# Patient Record
Sex: MALE | Race: WHITE | NOT HISPANIC OR LATINO | Employment: FULL TIME | URBAN - METROPOLITAN AREA
[De-identification: names, ages, dates, MRNs, and addresses within clinical notes are randomized per-mention and may not be internally consistent; named-entity substitution may affect disease eponyms.]

---

## 2017-04-08 ENCOUNTER — APPOINTMENT (OUTPATIENT)
Dept: LAB | Facility: CLINIC | Age: 67
End: 2017-04-08
Payer: COMMERCIAL

## 2017-04-08 DIAGNOSIS — I10 ESSENTIAL (PRIMARY) HYPERTENSION: ICD-10-CM

## 2017-04-08 DIAGNOSIS — E78.5 HYPERLIPIDEMIA: ICD-10-CM

## 2017-04-08 LAB
CHOLEST SERPL-MCNC: 175 MG/DL (ref 50–200)
HDLC SERPL-MCNC: 42 MG/DL (ref 40–60)
LDLC SERPL CALC-MCNC: 115 MG/DL (ref 0–100)
TRIGL SERPL-MCNC: 88 MG/DL

## 2017-04-08 PROCEDURE — 36415 COLL VENOUS BLD VENIPUNCTURE: CPT

## 2017-04-08 PROCEDURE — 80061 LIPID PANEL: CPT

## 2017-04-19 ENCOUNTER — ALLSCRIPTS OFFICE VISIT (OUTPATIENT)
Dept: OTHER | Facility: OTHER | Age: 67
End: 2017-04-19

## 2017-08-15 ENCOUNTER — APPOINTMENT (OUTPATIENT)
Dept: LAB | Facility: CLINIC | Age: 67
End: 2017-08-15
Payer: COMMERCIAL

## 2017-08-15 ENCOUNTER — TRANSCRIBE ORDERS (OUTPATIENT)
Dept: LAB | Facility: CLINIC | Age: 67
End: 2017-08-15

## 2017-08-15 DIAGNOSIS — R97.20 ELEVATED PROSTATE SPECIFIC ANTIGEN (PSA): Primary | ICD-10-CM

## 2017-08-15 DIAGNOSIS — R97.20 ELEVATED PROSTATE SPECIFIC ANTIGEN (PSA): ICD-10-CM

## 2017-08-15 LAB — PSA SERPL-MCNC: 5.1 NG/ML (ref 0–4)

## 2017-08-15 PROCEDURE — 84153 ASSAY OF PSA TOTAL: CPT

## 2017-08-23 ENCOUNTER — GENERIC CONVERSION - ENCOUNTER (OUTPATIENT)
Dept: OTHER | Facility: OTHER | Age: 67
End: 2017-08-23

## 2017-08-28 ENCOUNTER — GENERIC CONVERSION - ENCOUNTER (OUTPATIENT)
Dept: OTHER | Facility: OTHER | Age: 67
End: 2017-08-28

## 2017-10-02 DIAGNOSIS — Z00.00 ENCOUNTER FOR GENERAL ADULT MEDICAL EXAMINATION WITHOUT ABNORMAL FINDINGS: ICD-10-CM

## 2017-10-02 DIAGNOSIS — E78.5 HYPERLIPIDEMIA: ICD-10-CM

## 2017-10-02 DIAGNOSIS — E78.00 PURE HYPERCHOLESTEROLEMIA: ICD-10-CM

## 2017-10-02 DIAGNOSIS — I10 ESSENTIAL (PRIMARY) HYPERTENSION: ICD-10-CM

## 2017-10-07 ENCOUNTER — APPOINTMENT (OUTPATIENT)
Dept: LAB | Facility: CLINIC | Age: 67
End: 2017-10-07
Payer: COMMERCIAL

## 2017-10-07 DIAGNOSIS — Z00.00 ENCOUNTER FOR GENERAL ADULT MEDICAL EXAMINATION WITHOUT ABNORMAL FINDINGS: ICD-10-CM

## 2017-10-07 DIAGNOSIS — E78.00 PURE HYPERCHOLESTEROLEMIA: ICD-10-CM

## 2017-10-07 DIAGNOSIS — I10 ESSENTIAL (PRIMARY) HYPERTENSION: ICD-10-CM

## 2017-10-07 LAB
ALBUMIN SERPL BCP-MCNC: 3.8 G/DL (ref 3.5–5)
ALP SERPL-CCNC: 94 U/L (ref 46–116)
ALT SERPL W P-5'-P-CCNC: 21 U/L (ref 12–78)
ANION GAP SERPL CALCULATED.3IONS-SCNC: 6 MMOL/L (ref 4–13)
AST SERPL W P-5'-P-CCNC: 15 U/L (ref 5–45)
BASOPHILS # BLD AUTO: 0.03 THOUSANDS/ΜL (ref 0–0.1)
BASOPHILS NFR BLD AUTO: 1 % (ref 0–1)
BILIRUB SERPL-MCNC: 0.97 MG/DL (ref 0.2–1)
BUN SERPL-MCNC: 17 MG/DL (ref 5–25)
CALCIUM SERPL-MCNC: 8.1 MG/DL (ref 8.3–10.1)
CHLORIDE SERPL-SCNC: 105 MMOL/L (ref 100–108)
CHOLEST SERPL-MCNC: 153 MG/DL (ref 50–200)
CO2 SERPL-SCNC: 30 MMOL/L (ref 21–32)
CREAT SERPL-MCNC: 1.02 MG/DL (ref 0.6–1.3)
EOSINOPHIL # BLD AUTO: 0.05 THOUSAND/ΜL (ref 0–0.61)
EOSINOPHIL NFR BLD AUTO: 1 % (ref 0–6)
ERYTHROCYTE [DISTWIDTH] IN BLOOD BY AUTOMATED COUNT: 12.8 % (ref 11.6–15.1)
GFR SERPL CREATININE-BSD FRML MDRD: 77 ML/MIN/1.73SQ M
GLUCOSE P FAST SERPL-MCNC: 102 MG/DL (ref 65–99)
HCT VFR BLD AUTO: 47.9 % (ref 36.5–49.3)
HDLC SERPL-MCNC: 50 MG/DL (ref 40–60)
HGB BLD-MCNC: 16.7 G/DL (ref 12–17)
LDLC SERPL CALC-MCNC: 87 MG/DL (ref 0–100)
LYMPHOCYTES # BLD AUTO: 0.94 THOUSANDS/ΜL (ref 0.6–4.47)
LYMPHOCYTES NFR BLD AUTO: 22 % (ref 14–44)
MCH RBC QN AUTO: 30.8 PG (ref 26.8–34.3)
MCHC RBC AUTO-ENTMCNC: 34.9 G/DL (ref 31.4–37.4)
MCV RBC AUTO: 88 FL (ref 82–98)
MONOCYTES # BLD AUTO: 0.35 THOUSAND/ΜL (ref 0.17–1.22)
MONOCYTES NFR BLD AUTO: 8 % (ref 4–12)
NEUTROPHILS # BLD AUTO: 2.98 THOUSANDS/ΜL (ref 1.85–7.62)
NEUTS SEG NFR BLD AUTO: 68 % (ref 43–75)
NRBC BLD AUTO-RTO: 0 /100 WBCS
PLATELET # BLD AUTO: 239 THOUSANDS/UL (ref 149–390)
PMV BLD AUTO: 9.1 FL (ref 8.9–12.7)
POTASSIUM SERPL-SCNC: 4.5 MMOL/L (ref 3.5–5.3)
PROT SERPL-MCNC: 7.6 G/DL (ref 6.4–8.2)
RBC # BLD AUTO: 5.42 MILLION/UL (ref 3.88–5.62)
SODIUM SERPL-SCNC: 141 MMOL/L (ref 136–145)
TRIGL SERPL-MCNC: 78 MG/DL
WBC # BLD AUTO: 4.37 THOUSAND/UL (ref 4.31–10.16)

## 2017-10-07 PROCEDURE — 85025 COMPLETE CBC W/AUTO DIFF WBC: CPT

## 2017-10-07 PROCEDURE — 80053 COMPREHEN METABOLIC PANEL: CPT

## 2017-10-07 PROCEDURE — 36415 COLL VENOUS BLD VENIPUNCTURE: CPT

## 2017-10-07 PROCEDURE — 80061 LIPID PANEL: CPT

## 2017-10-30 ENCOUNTER — GENERIC CONVERSION - ENCOUNTER (OUTPATIENT)
Dept: OTHER | Facility: OTHER | Age: 67
End: 2017-10-30

## 2018-01-13 VITALS
SYSTOLIC BLOOD PRESSURE: 130 MMHG | OXYGEN SATURATION: 98 % | BODY MASS INDEX: 23.86 KG/M2 | HEIGHT: 73 IN | WEIGHT: 180 LBS | HEART RATE: 70 BPM | DIASTOLIC BLOOD PRESSURE: 70 MMHG

## 2018-01-22 VITALS
BODY MASS INDEX: 19.51 KG/M2 | OXYGEN SATURATION: 96 % | SYSTOLIC BLOOD PRESSURE: 126 MMHG | WEIGHT: 147.25 LBS | HEART RATE: 74 BPM | HEIGHT: 73 IN | DIASTOLIC BLOOD PRESSURE: 76 MMHG

## 2018-02-07 DIAGNOSIS — E78.5 HYPERLIPIDEMIA, UNSPECIFIED HYPERLIPIDEMIA TYPE: ICD-10-CM

## 2018-02-07 DIAGNOSIS — I10 BENIGN HYPERTENSION: Primary | ICD-10-CM

## 2018-02-07 DIAGNOSIS — E78.00 HYPERCHOLESTEREMIA: ICD-10-CM

## 2018-02-07 DIAGNOSIS — R60.9 EDEMA, UNSPECIFIED TYPE: ICD-10-CM

## 2018-02-07 RX ORDER — LOSARTAN POTASSIUM 100 MG/1
1 TABLET ORAL DAILY
COMMUNITY
Start: 2014-07-24 | End: 2018-02-07 | Stop reason: SDUPTHER

## 2018-02-08 RX ORDER — LOSARTAN POTASSIUM 100 MG/1
100 TABLET ORAL DAILY
Qty: 90 TABLET | Refills: 3 | Status: SHIPPED | OUTPATIENT
Start: 2018-02-08 | End: 2019-02-20 | Stop reason: CLARIF

## 2018-05-10 ENCOUNTER — APPOINTMENT (OUTPATIENT)
Dept: LAB | Facility: CLINIC | Age: 68
End: 2018-05-10
Payer: COMMERCIAL

## 2018-05-10 DIAGNOSIS — I10 ESSENTIAL (PRIMARY) HYPERTENSION: ICD-10-CM

## 2018-05-10 DIAGNOSIS — E78.5 HYPERLIPIDEMIA: ICD-10-CM

## 2018-05-10 LAB
ANION GAP SERPL CALCULATED.3IONS-SCNC: 4 MMOL/L (ref 4–13)
BUN SERPL-MCNC: 19 MG/DL (ref 5–25)
CALCIUM SERPL-MCNC: 8.7 MG/DL (ref 8.3–10.1)
CHLORIDE SERPL-SCNC: 106 MMOL/L (ref 100–108)
CHOLEST SERPL-MCNC: 146 MG/DL (ref 50–200)
CO2 SERPL-SCNC: 29 MMOL/L (ref 21–32)
CREAT SERPL-MCNC: 1.16 MG/DL (ref 0.6–1.3)
GFR SERPL CREATININE-BSD FRML MDRD: 65 ML/MIN/1.73SQ M
GLUCOSE P FAST SERPL-MCNC: 103 MG/DL (ref 65–99)
HDLC SERPL-MCNC: 43 MG/DL (ref 40–60)
LDLC SERPL CALC-MCNC: 80 MG/DL (ref 0–100)
NONHDLC SERPL-MCNC: 103 MG/DL
POTASSIUM SERPL-SCNC: 4.1 MMOL/L (ref 3.5–5.3)
SODIUM SERPL-SCNC: 139 MMOL/L (ref 136–145)
TRIGL SERPL-MCNC: 114 MG/DL

## 2018-05-10 PROCEDURE — 80061 LIPID PANEL: CPT

## 2018-05-10 PROCEDURE — 80048 BASIC METABOLIC PNL TOTAL CA: CPT

## 2018-05-10 PROCEDURE — 36415 COLL VENOUS BLD VENIPUNCTURE: CPT

## 2018-05-18 ENCOUNTER — OFFICE VISIT (OUTPATIENT)
Dept: INTERNAL MEDICINE CLINIC | Facility: CLINIC | Age: 68
End: 2018-05-18
Payer: COMMERCIAL

## 2018-05-18 VITALS
BODY MASS INDEX: 23.99 KG/M2 | DIASTOLIC BLOOD PRESSURE: 76 MMHG | HEART RATE: 79 BPM | HEIGHT: 73 IN | OXYGEN SATURATION: 97 % | SYSTOLIC BLOOD PRESSURE: 130 MMHG | WEIGHT: 181 LBS

## 2018-05-18 DIAGNOSIS — I10 BENIGN ESSENTIAL HYPERTENSION: Primary | ICD-10-CM

## 2018-05-18 DIAGNOSIS — N40.0 BENIGN PROSTATIC HYPERPLASIA WITHOUT LOWER URINARY TRACT SYMPTOMS: ICD-10-CM

## 2018-05-18 DIAGNOSIS — K63.5 POLYP OF COLON, UNSPECIFIED PART OF COLON, UNSPECIFIED TYPE: ICD-10-CM

## 2018-05-18 PROCEDURE — 3075F SYST BP GE 130 - 139MM HG: CPT | Performed by: INTERNAL MEDICINE

## 2018-05-18 PROCEDURE — 1101F PT FALLS ASSESS-DOCD LE1/YR: CPT | Performed by: INTERNAL MEDICINE

## 2018-05-18 PROCEDURE — 99214 OFFICE O/P EST MOD 30 MIN: CPT | Performed by: INTERNAL MEDICINE

## 2018-05-18 PROCEDURE — 3008F BODY MASS INDEX DOCD: CPT | Performed by: INTERNAL MEDICINE

## 2018-05-18 PROCEDURE — 3078F DIAST BP <80 MM HG: CPT | Performed by: INTERNAL MEDICINE

## 2018-05-18 NOTE — PROGRESS NOTES
Assessment/Plan:  Labs reviewed  Cholesterol is quite good  PSA followed by his urologist     Blood pressure under control  Up-to-date on colonoscopies for colon polyps  No cardiac complaints  Up-to-date on eye checkups  Review of advised him to get a shingles vaccine  Will see him in 6 months  Recent Results (from the past 1008 hour(s))   Basic metabolic panel    Collection Time: 05/10/18  7:55 AM   Result Value Ref Range    Sodium 139 136 - 145 mmol/L    Potassium 4 1 3 5 - 5 3 mmol/L    Chloride 106 100 - 108 mmol/L    CO2 29 21 - 32 mmol/L    Anion Gap 4 4 - 13 mmol/L    BUN 19 5 - 25 mg/dL    Creatinine 1 16 0 60 - 1 30 mg/dL    Glucose, Fasting 103 (H) 65 - 99 mg/dL    Calcium 8 7 8 3 - 10 1 mg/dL    eGFR 65 ml/min/1 73sq m   Lipid panel    Collection Time: 05/10/18  7:55 AM   Result Value Ref Range    Cholesterol 146 50 - 200 mg/dL    Triglycerides 114 <=150 mg/dL    HDL, Direct 43 40 - 60 mg/dL    LDL Calculated 80 0 - 100 mg/dL    Non-HDL-Chol (CHOL-HDL) 103 mg/dl       1  Benign essential hypertension  Basic metabolic panel       Orders Placed This Encounter   Procedures    Basic metabolic panel    Hm Colonoscopy         Subjective:  Problems are 1  BPH 2  Colon polyps 3  Hypertension     Patient ID: Itz Christine is a 78 y o  male  HPI he is doing quite well  He retired  He is enjoying his longterm  No cardiopulmonary complaints  No chest pain shortness of breath  The following portions of the patient's history were reviewed and updated as appropriate:   He has a past medical history of Benign neoplasm of large intestine; Hemorrhoids; Inflamed seborrheic keratosis; Multiple benign polyps of large intestine; Neoplasm of uncertain behavior of skin; and Sebaceous cyst ,   does not have any pertinent problems on file  ,   has a past surgical history that includes Knee arthroscopy (Left); Hand surgery (Left); Inguinal hernia repair; and Tonsillectomy  ,  family history includes Other in his father; Stroke in his mother  ,   reports that he has never smoked  He has never used smokeless tobacco  He reports that he does not drink alcohol or use drugs  ,  has No Known Allergies       Current Outpatient Prescriptions:     losartan (COZAAR) 100 MG tablet, Take 1 tablet (100 mg total) by mouth daily, Disp: 90 tablet, Rfl: 3    Review of Systems   Constitutional: Negative for activity change, appetite change, chills, diaphoresis, fatigue, fever and unexpected weight change  HENT: Negative for congestion, ear pain, hearing loss, mouth sores, nosebleeds, postnasal drip, sinus pain, sinus pressure, sore throat and trouble swallowing  Eyes: Negative for pain, discharge and visual disturbance  Respiratory: Negative for apnea, cough, chest tightness, shortness of breath and wheezing  Cardiovascular: Negative for chest pain, palpitations and leg swelling  Gastrointestinal: Negative for abdominal pain, anal bleeding, blood in stool, constipation, diarrhea, nausea and vomiting  Endocrine: Negative for polydipsia and polyphagia  Genitourinary: Negative for decreased urine volume, dysuria, flank pain, frequency, hematuria and urgency  Musculoskeletal: Negative for arthralgias, back pain, gait problem, joint swelling and myalgias  Skin: Negative for rash and wound  Allergic/Immunologic: Negative for environmental allergies and food allergies  Neurological: Negative for dizziness, tremors, seizures, syncope, speech difficulty, light-headedness, numbness and headaches  Hematological: Negative for adenopathy  Does not bruise/bleed easily  Psychiatric/Behavioral: Negative for agitation, confusion, hallucinations, sleep disturbance and suicidal ideas  The patient is not nervous/anxious  Objective:  Vitals:    05/18/18 0958   BP: 130/76   Pulse: 79   SpO2: 97%     Body mass index is 23 88 kg/m²       Physical Exam   Constitutional: He appears well-developed and well-nourished  No distress  Blood pressure is 130/82  Rhythm is regular  No murmur  HENT:   Head: Normocephalic  Right Ear: External ear normal    Left Ear: External ear normal    Nose: Nose normal    Mouth/Throat: Oropharynx is clear and moist  No oropharyngeal exudate  Eyes: Conjunctivae and EOM are normal  Pupils are equal, round, and reactive to light  Right eye exhibits no discharge  Left eye exhibits no discharge  Neck: Normal range of motion  Neck supple  No thyromegaly present  Cardiovascular: Normal rate, regular rhythm, normal heart sounds and intact distal pulses  Exam reveals no gallop and no friction rub  No murmur heard  Pulmonary/Chest: Effort normal and breath sounds normal  No respiratory distress  He has no wheezes  He has no rales  Abdominal: Soft  Bowel sounds are normal  He exhibits no distension and no mass  There is no tenderness  There is no rebound and no guarding  Musculoskeletal: Normal range of motion  He exhibits no edema, tenderness or deformity  Lymphadenopathy:     He has no cervical adenopathy  Neurological: He is alert  He has normal reflexes  No cranial nerve deficit  Coordination normal    Skin: Skin is warm and dry  No rash noted  No erythema  Psychiatric: He has a normal mood and affect  His behavior is normal  Judgment and thought content normal    Nursing note and vitals reviewed

## 2018-08-14 ENCOUNTER — TRANSCRIBE ORDERS (OUTPATIENT)
Dept: LAB | Facility: CLINIC | Age: 68
End: 2018-08-14

## 2018-08-14 ENCOUNTER — APPOINTMENT (OUTPATIENT)
Dept: LAB | Facility: CLINIC | Age: 68
End: 2018-08-14
Payer: MEDICARE

## 2018-08-14 DIAGNOSIS — R97.20 ELEVATED PROSTATE SPECIFIC ANTIGEN (PSA): ICD-10-CM

## 2018-08-14 DIAGNOSIS — R97.20 ELEVATED PROSTATE SPECIFIC ANTIGEN (PSA): Primary | ICD-10-CM

## 2018-08-14 LAB — PSA SERPL-MCNC: 5.9 NG/ML (ref 0–4)

## 2018-08-14 PROCEDURE — 84153 ASSAY OF PSA TOTAL: CPT

## 2018-11-08 ENCOUNTER — APPOINTMENT (OUTPATIENT)
Dept: LAB | Facility: CLINIC | Age: 68
End: 2018-11-08
Payer: MEDICARE

## 2018-11-08 DIAGNOSIS — I10 BENIGN ESSENTIAL HYPERTENSION: ICD-10-CM

## 2018-11-08 LAB
ANION GAP SERPL CALCULATED.3IONS-SCNC: 5 MMOL/L (ref 4–13)
BUN SERPL-MCNC: 17 MG/DL (ref 5–25)
CALCIUM SERPL-MCNC: 8.8 MG/DL (ref 8.3–10.1)
CHLORIDE SERPL-SCNC: 102 MMOL/L (ref 100–108)
CO2 SERPL-SCNC: 30 MMOL/L (ref 21–32)
CREAT SERPL-MCNC: 1.09 MG/DL (ref 0.6–1.3)
GFR SERPL CREATININE-BSD FRML MDRD: 70 ML/MIN/1.73SQ M
GLUCOSE P FAST SERPL-MCNC: 98 MG/DL (ref 65–99)
POTASSIUM SERPL-SCNC: 4.1 MMOL/L (ref 3.5–5.3)
SODIUM SERPL-SCNC: 137 MMOL/L (ref 136–145)

## 2018-11-08 PROCEDURE — 36415 COLL VENOUS BLD VENIPUNCTURE: CPT

## 2018-11-08 PROCEDURE — 80048 BASIC METABOLIC PNL TOTAL CA: CPT

## 2018-11-16 ENCOUNTER — TELEPHONE (OUTPATIENT)
Dept: INTERNAL MEDICINE CLINIC | Facility: CLINIC | Age: 68
End: 2018-11-16

## 2018-11-19 ENCOUNTER — OFFICE VISIT (OUTPATIENT)
Dept: INTERNAL MEDICINE CLINIC | Facility: CLINIC | Age: 68
End: 2018-11-19
Payer: MEDICARE

## 2018-11-19 VITALS
HEART RATE: 64 BPM | WEIGHT: 182.8 LBS | HEIGHT: 73 IN | DIASTOLIC BLOOD PRESSURE: 82 MMHG | SYSTOLIC BLOOD PRESSURE: 156 MMHG | RESPIRATION RATE: 12 BRPM | BODY MASS INDEX: 24.23 KG/M2

## 2018-11-19 DIAGNOSIS — Z00.00 HEALTH CARE MAINTENANCE: ICD-10-CM

## 2018-11-19 DIAGNOSIS — I10 BENIGN ESSENTIAL HYPERTENSION: Primary | ICD-10-CM

## 2018-11-19 DIAGNOSIS — L98.9 SKIN LESION: ICD-10-CM

## 2018-11-19 DIAGNOSIS — K63.5 POLYP OF COLON, UNSPECIFIED PART OF COLON, UNSPECIFIED TYPE: ICD-10-CM

## 2018-11-19 PROCEDURE — 99214 OFFICE O/P EST MOD 30 MIN: CPT | Performed by: INTERNAL MEDICINE

## 2018-11-19 PROCEDURE — G0009 ADMIN PNEUMOCOCCAL VACCINE: HCPCS | Performed by: INTERNAL MEDICINE

## 2018-11-19 PROCEDURE — 90732 PPSV23 VACC 2 YRS+ SUBQ/IM: CPT | Performed by: INTERNAL MEDICINE

## 2018-11-19 PROCEDURE — G0402 INITIAL PREVENTIVE EXAM: HCPCS | Performed by: INTERNAL MEDICINE

## 2018-11-19 NOTE — PROGRESS NOTES
Assessment/Plan:  Regarding his blood pressure is mildly elevated today  He is going to monitor his blood pressure and give me a list in 3-4 weeks  If it is elevated he is going to need increased medicine  He is already slender and does not use all  The importance of calling me for the results of his blood pressure reading was stressed to the patient to make sure that I have seen them and acted appropriately  He has a skin lesion on his left shoulder which needs to be checked by his dermatologist   He has [facility] up in the [facility] and will call him at and see him as soon as possible  He is up-to-date on prostate checkups  He is not yet due for colonoscopy  He has no cardiac complaints  He will get a Pneumovax today  He already reviewed received his Prevnar 15  I will see him back here in 6 months  I have advised him to get a shingles vaccine as well  Recent Results (from the past 1008 hour(s))   Basic metabolic panel    Collection Time: 11/08/18  7:18 AM   Result Value Ref Range    Sodium 137 136 - 145 mmol/L    Potassium 4 1 3 5 - 5 3 mmol/L    Chloride 102 100 - 108 mmol/L    CO2 30 21 - 32 mmol/L    ANION GAP 5 4 - 13 mmol/L    BUN 17 5 - 25 mg/dL    Creatinine 1 09 0 60 - 1 30 mg/dL    Glucose, Fasting 98 65 - 99 mg/dL    Calcium 8 8 8 3 - 10 1 mg/dL    eGFR 70 ml/min/1 73sq m       No diagnosis found  No orders of the defined types were placed in this encounter  Subjective:  Problems are 1  Hypertension 2  History of colon polyps 3  Elevated PSA     Patient ID: Loida Jordan is a 80 y o  male  HPI reason for six-month checkup  Blood pressure is a little high at 140 2/80  No cardiac symptoms  Up-to-date on colon polyps  Sees the urologist for his elevated PSA  The following portions of the patient's history were reviewed and updated as appropriate:   He has a past medical history of Benign neoplasm of large intestine; Hemorrhoids;  Inflamed seborrheic keratosis; Multiple benign polyps of large intestine; Neoplasm of uncertain behavior of skin; and Sebaceous cyst ,   does not have any pertinent problems on file  ,   has a past surgical history that includes Knee arthroscopy (Left); Hand surgery (Left); Inguinal hernia repair; and Tonsillectomy  ,  family history includes Other in his father; Stroke in his mother  ,   reports that he has never smoked  He has never used smokeless tobacco  He reports that he does not drink alcohol or use drugs  ,  has No Known Allergies       Current Outpatient Prescriptions:     losartan (COZAAR) 100 MG tablet, Take 1 tablet (100 mg total) by mouth daily, Disp: 90 tablet, Rfl: 3    Review of Systems   Constitutional: Negative for activity change, appetite change, chills, diaphoresis, fatigue, fever and unexpected weight change  HENT: Negative for congestion, ear pain, hearing loss, mouth sores, nosebleeds, postnasal drip, sinus pain, sinus pressure, sore throat and trouble swallowing  Eyes: Negative for pain, discharge and visual disturbance  Respiratory: Negative for apnea, cough, chest tightness, shortness of breath and wheezing  Cardiovascular: Negative for chest pain, palpitations and leg swelling  Gastrointestinal: Negative for abdominal pain, anal bleeding, blood in stool, constipation, diarrhea, nausea and vomiting  Positive history of colon polyps   Endocrine: Negative for polydipsia and polyphagia  Genitourinary: Negative for decreased urine volume, dysuria, flank pain, frequency, hematuria and urgency  Musculoskeletal: Negative for arthralgias, back pain, gait problem, joint swelling and myalgias  Skin: Negative for rash and wound  Allergic/Immunologic: Negative for environmental allergies and food allergies  Neurological: Negative for dizziness, tremors, seizures, syncope, speech difficulty, light-headedness, numbness and headaches  Hematological: Negative for adenopathy   Does not bruise/bleed easily  Psychiatric/Behavioral: Negative for agitation, confusion, hallucinations, sleep disturbance and suicidal ideas  The patient is not nervous/anxious  Objective:  /82 (BP Location: Left arm, Patient Position: Sitting)   Pulse 64   Resp 12   Ht 6' 1" (1 854 m)   Wt 82 9 kg (182 lb 12 8 oz)   BMI 24 12 kg/m²      Physical Exam   Constitutional: He appears well-developed and well-nourished  No distress  HENT:   Head: Normocephalic  Right Ear: External ear normal    Left Ear: External ear normal    Nose: Nose normal    Mouth/Throat: Oropharynx is clear and moist  No oropharyngeal exudate  Eyes: Pupils are equal, round, and reactive to light  Conjunctivae and EOM are normal  Right eye exhibits no discharge  Left eye exhibits no discharge  Neck: Normal range of motion  Neck supple  No thyromegaly present  Cardiovascular: Normal rate, regular rhythm, normal heart sounds and intact distal pulses  Exam reveals no gallop and no friction rub  No murmur heard  Pulmonary/Chest: Effort normal and breath sounds normal  No respiratory distress  He has no wheezes  He has no rales  Abdominal: Soft  Bowel sounds are normal  He exhibits no distension and no mass  There is no tenderness  There is no rebound and no guarding  Musculoskeletal: Normal range of motion  He exhibits no edema, tenderness or deformity  Lymphadenopathy:     He has no cervical adenopathy  Neurological: He is alert  He has normal reflexes  No cranial nerve deficit  Coordination normal    Skin: Skin is warm and dry  No rash noted  No erythema  He has a raised pink lesion on his left shoulder  About 3 mm in diameter   Psychiatric: He has a normal mood and affect  His behavior is normal  Judgment and thought content normal    Nursing note and vitals reviewed

## 2018-11-19 NOTE — PROGRESS NOTES
Assessment and Plan:Patient in for wellness questionnaire  Items discussed  Problem List Items Addressed This Visit     None        Health Maintenance Due   Topic Date Due    Hepatitis C Screening  1950    DTaP,Tdap,and Td Vaccines (1 - Tdap) 11/02/1972    Pneumococcal PPSV23/PCV13 65+ Years / High and Highest Risk (2 of 2 - PPSV23) 04/24/2018    INFLUENZA VACCINE  07/01/2018         HPI:  Mei Lazar is a 80 y o  male here for his Subsequent Wellness Visit  Patient Active Problem List   Diagnosis    Benign essential hypertension     Past Medical History:   Diagnosis Date    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    Hemorrhoids     Inflamed seborrheic keratosis     Multiple benign polyps of large intestine     History of personal colonic polyps    Neoplasm of uncertain behavior of skin     Sebaceous cyst      Past Surgical History:   Procedure Laterality Date    HAND SURGERY Left     thumb ligament repair    INGUINAL HERNIA REPAIR      KNEE ARTHROSCOPY Left     TONSILLECTOMY       Family History   Problem Relation Age of Onset    Stroke Mother         stroke syndrome    Other Father         hematologic disorder     History   Smoking Status    Never Smoker   Smokeless Tobacco    Never Used     History   Alcohol Use No      History   Drug Use No       Current Outpatient Prescriptions   Medication Sig Dispense Refill    losartan (COZAAR) 100 MG tablet Take 1 tablet (100 mg total) by mouth daily 90 tablet 3     No current facility-administered medications for this visit  No Known Allergies  Immunization History   Administered Date(s) Administered    Influenza Split High Dose Preservative Free IM 10/07/2017    Influenza TIV (IM) 10/01/2015, 10/01/2016    Tdap 1950       Patient Care Team:  Michelle Toth DO as PCP - General    Medicare Screening Tests and Risk Assessments:  Isaak Kim is here for his Subsequent Wellness visit    Last Medicare Wellness visit information reviewed, patient interviewed and updates made to the record today  Health Risk Assessment:  Patient rates overall health as very good  Patient feels that their physical health rating is Same  Eyesight was rated as Same  Hearing was rated as Same  Patient feels that their emotional and mental health rating is Same  Pain experienced by patient in the last 7 days has been None  Emotional/Mental Health:  Patient has been feeling nervous/anxious  PHQ-9 Depression Screening:    Frequency of the following problems over the past two weeks:      1  Little interest or pleasure in doing things: 0 - not at all      2  Feeling down, depressed, or hopeless: 0 - not at all  PHQ-2 Score: 0          Broken Bones/Falls: Fall Risk Assessment:    In the past year, patient has experienced: No history of falling in past year          Bladder/Bowel:  Patient has not leaked urine accidently in the last six months  Patient reports no loss of bowel control  Immunizations:  Patient has had a flu vaccination within the last year  Patient has received a pneumonia shot  Patient has received a shingles shot  Patient has received tetanus/diphtheria shot  Date of tetanus/diphtheria shot: 9/1/2014    Home Safety:  Patient does not have trouble with stairs inside or outside of their home  Patient currently reports that there are no safety hazards present in home, working smoke alarms, working carbon monoxide detectors  Preventative Screenings:   colon cancer screen completed, 8/28/2017  cholesterol screen completed, 11/8/2018  glaucoma eye exam completed, 11/19/2017      Nutrition:  Current diet: Low Cholesterol, Low Saturated Fat, No Added Salt and Limited junk food with servings of the following:    Medications:  Patient is not currently taking any over-the-counter supplements  Patient is able to manage medications  Lifestyle Choices:  Patient reports no tobacco use    Patient has smoked or used tobacco in the past   Patient has stopped his tobacco use  Tobacco use quit date: 1979  Patient reports alcohol use  Alcohol use per week: 1 beer  Patient drives a vehicle  Patient wears seat belt  Current level of exercise of physical activity described by patient as: Walk 1-2 miles a day  Activities of Daily Living:  Can get out of bed by his or her self, able to dress self, able to make own meals, able to do own shopping, able to bathe self, can do own laundry/housekeeping, can manage own money, pay bills and track expenses    Previous Hospitalizations:  No hospitalization or ED visit in past 12 months        Advanced Directives:  Patient has decided on a power of   Patient has spoken to designated power of   Patient has completed advanced directive          Preventative Screening/Counseling:      Cardiovascular:      General: Risks and Benefits Discussed and Screening Current          Diabetes:      General: Risks and Benefits Discussed and Screening Current          Colorectal Cancer:      General: Risks and Benefits Discussed and Screening Current          Prostate Cancer:      General: Risks and Benefits Discussed and Screening Current          Osteoporosis:      General: Screening Not Indicated          AAA:      General: Screening Not Indicated          Glaucoma:      General: Risks and Benefits Discussed and Screening Current          HIV:      General: Screening Not Indicated          Hepatitis C:      General: Screening Not Indicated        Advanced Directives:   Patient has living will for healthcare, has durable POA for healthcare,

## 2019-02-19 ENCOUNTER — TELEPHONE (OUTPATIENT)
Dept: INTERNAL MEDICINE CLINIC | Facility: CLINIC | Age: 69
End: 2019-02-19

## 2019-02-19 NOTE — TELEPHONE ENCOUNTER
Patient is out of his Losartan 100 mg  And said because this has been recalled, would Dr prescribe a replacement for him    Start with 30 day supply to [facility], he wants to be sure he doesn't experience any side affects    He will then get another 30 day supply    To be sure, then after that it can be sent to his mail order

## 2019-02-20 DIAGNOSIS — I10 ESSENTIAL HYPERTENSION: Primary | ICD-10-CM

## 2019-02-20 RX ORDER — IRBESARTAN AND HYDROCHLOROTHIAZIDE 150; 12.5 MG/1; MG/1
1 TABLET, FILM COATED ORAL DAILY
Qty: 30 TABLET | Refills: 5 | Status: SHIPPED | OUTPATIENT
Start: 2019-02-20 | End: 2020-02-24 | Stop reason: CLARIF

## 2019-03-25 ENCOUNTER — TELEPHONE (OUTPATIENT)
Dept: INTERNAL MEDICINE CLINIC | Facility: CLINIC | Age: 69
End: 2019-03-25

## 2019-05-22 ENCOUNTER — APPOINTMENT (OUTPATIENT)
Dept: LAB | Facility: CLINIC | Age: 69
End: 2019-05-22
Payer: MEDICARE

## 2019-05-22 DIAGNOSIS — I10 BENIGN ESSENTIAL HYPERTENSION: ICD-10-CM

## 2019-05-22 LAB
ALBUMIN SERPL BCP-MCNC: 3.8 G/DL (ref 3.5–5)
ALP SERPL-CCNC: 91 U/L (ref 46–116)
ALT SERPL W P-5'-P-CCNC: 21 U/L (ref 12–78)
ANION GAP SERPL CALCULATED.3IONS-SCNC: 4 MMOL/L (ref 4–13)
AST SERPL W P-5'-P-CCNC: 14 U/L (ref 5–45)
BILIRUB SERPL-MCNC: 1.02 MG/DL (ref 0.2–1)
BUN SERPL-MCNC: 18 MG/DL (ref 5–25)
CALCIUM SERPL-MCNC: 8.8 MG/DL (ref 8.3–10.1)
CHLORIDE SERPL-SCNC: 103 MMOL/L (ref 100–108)
CHOLEST SERPL-MCNC: 196 MG/DL (ref 50–200)
CO2 SERPL-SCNC: 30 MMOL/L (ref 21–32)
CREAT SERPL-MCNC: 1.25 MG/DL (ref 0.6–1.3)
ERYTHROCYTE [DISTWIDTH] IN BLOOD BY AUTOMATED COUNT: 13 % (ref 11.6–15.1)
GFR SERPL CREATININE-BSD FRML MDRD: 59 ML/MIN/1.73SQ M
GLUCOSE P FAST SERPL-MCNC: 107 MG/DL (ref 65–99)
HCT VFR BLD AUTO: 47.8 % (ref 36.5–49.3)
HDLC SERPL-MCNC: 44 MG/DL (ref 40–60)
HGB BLD-MCNC: 15.6 G/DL (ref 12–17)
LDLC SERPL CALC-MCNC: 135 MG/DL (ref 0–100)
MCH RBC QN AUTO: 29.7 PG (ref 26.8–34.3)
MCHC RBC AUTO-ENTMCNC: 32.6 G/DL (ref 31.4–37.4)
MCV RBC AUTO: 91 FL (ref 82–98)
NONHDLC SERPL-MCNC: 152 MG/DL
PLATELET # BLD AUTO: 230 THOUSANDS/UL (ref 149–390)
PMV BLD AUTO: 9 FL (ref 8.9–12.7)
POTASSIUM SERPL-SCNC: 4.4 MMOL/L (ref 3.5–5.3)
PROT SERPL-MCNC: 7.4 G/DL (ref 6.4–8.2)
RBC # BLD AUTO: 5.26 MILLION/UL (ref 3.88–5.62)
SODIUM SERPL-SCNC: 137 MMOL/L (ref 136–145)
TRIGL SERPL-MCNC: 83 MG/DL
WBC # BLD AUTO: 4.13 THOUSAND/UL (ref 4.31–10.16)

## 2019-05-22 PROCEDURE — 85027 COMPLETE CBC AUTOMATED: CPT

## 2019-05-22 PROCEDURE — 80053 COMPREHEN METABOLIC PANEL: CPT

## 2019-05-22 PROCEDURE — 80061 LIPID PANEL: CPT

## 2019-05-22 PROCEDURE — 36415 COLL VENOUS BLD VENIPUNCTURE: CPT

## 2019-05-28 ENCOUNTER — OFFICE VISIT (OUTPATIENT)
Dept: INTERNAL MEDICINE CLINIC | Facility: CLINIC | Age: 69
End: 2019-05-28
Payer: MEDICARE

## 2019-05-28 VITALS
OXYGEN SATURATION: 97 % | SYSTOLIC BLOOD PRESSURE: 130 MMHG | HEIGHT: 73 IN | BODY MASS INDEX: 23.99 KG/M2 | HEART RATE: 66 BPM | DIASTOLIC BLOOD PRESSURE: 84 MMHG | WEIGHT: 181 LBS

## 2019-05-28 DIAGNOSIS — I10 BENIGN ESSENTIAL HYPERTENSION: Primary | ICD-10-CM

## 2019-05-28 DIAGNOSIS — R73.9 HYPERGLYCEMIA: ICD-10-CM

## 2019-05-28 DIAGNOSIS — E78.5 BORDERLINE HYPERLIPIDEMIA: ICD-10-CM

## 2019-05-28 DIAGNOSIS — Z86.010 HISTORY OF COLON POLYPS: ICD-10-CM

## 2019-05-28 PROBLEM — Z86.0100 HISTORY OF COLON POLYPS: Status: ACTIVE | Noted: 2019-05-28

## 2019-05-28 PROCEDURE — 99214 OFFICE O/P EST MOD 30 MIN: CPT | Performed by: INTERNAL MEDICINE

## 2019-06-27 ENCOUNTER — TELEPHONE (OUTPATIENT)
Dept: INTERNAL MEDICINE CLINIC | Facility: CLINIC | Age: 69
End: 2019-06-27

## 2019-06-27 DIAGNOSIS — I10 ESSENTIAL HYPERTENSION: Primary | ICD-10-CM

## 2019-06-27 RX ORDER — LISINOPRIL AND HYDROCHLOROTHIAZIDE 20; 12.5 MG/1; MG/1
1 TABLET ORAL DAILY
Qty: 30 TABLET | Refills: 5 | Status: SHIPPED | OUTPATIENT
Start: 2019-06-27 | End: 2019-12-18 | Stop reason: SDUPTHER

## 2019-08-13 ENCOUNTER — APPOINTMENT (OUTPATIENT)
Dept: LAB | Facility: CLINIC | Age: 69
End: 2019-08-13
Payer: MEDICARE

## 2019-08-13 ENCOUNTER — TRANSCRIBE ORDERS (OUTPATIENT)
Dept: LAB | Facility: CLINIC | Age: 69
End: 2019-08-13

## 2019-08-13 DIAGNOSIS — R97.20 ELEVATED PROSTATE SPECIFIC ANTIGEN (PSA): Primary | ICD-10-CM

## 2019-08-13 DIAGNOSIS — R97.20 ELEVATED PROSTATE SPECIFIC ANTIGEN (PSA): ICD-10-CM

## 2019-08-13 LAB — PSA SERPL-MCNC: 7.4 NG/ML (ref 0–4)

## 2019-08-13 PROCEDURE — 84153 ASSAY OF PSA TOTAL: CPT

## 2019-10-01 ENCOUNTER — APPOINTMENT (OUTPATIENT)
Dept: LAB | Facility: CLINIC | Age: 69
End: 2019-10-01
Payer: MEDICARE

## 2019-10-01 DIAGNOSIS — R73.9 HYPERGLYCEMIA: ICD-10-CM

## 2019-10-01 DIAGNOSIS — E78.5 BORDERLINE HYPERLIPIDEMIA: ICD-10-CM

## 2019-10-01 DIAGNOSIS — I10 BENIGN ESSENTIAL HYPERTENSION: ICD-10-CM

## 2019-10-01 LAB
ANION GAP SERPL CALCULATED.3IONS-SCNC: 4 MMOL/L (ref 4–13)
BUN SERPL-MCNC: 22 MG/DL (ref 5–25)
CALCIUM SERPL-MCNC: 9.1 MG/DL (ref 8.3–10.1)
CHLORIDE SERPL-SCNC: 106 MMOL/L (ref 100–108)
CHOLEST SERPL-MCNC: 188 MG/DL (ref 50–200)
CO2 SERPL-SCNC: 30 MMOL/L (ref 21–32)
CREAT SERPL-MCNC: 1.17 MG/DL (ref 0.6–1.3)
EST. AVERAGE GLUCOSE BLD GHB EST-MCNC: 114 MG/DL
GFR SERPL CREATININE-BSD FRML MDRD: 64 ML/MIN/1.73SQ M
GLUCOSE P FAST SERPL-MCNC: 106 MG/DL (ref 65–99)
HBA1C MFR BLD: 5.6 % (ref 4.2–6.3)
HDLC SERPL-MCNC: 44 MG/DL (ref 40–60)
LDLC SERPL CALC-MCNC: 125 MG/DL (ref 0–100)
NONHDLC SERPL-MCNC: 144 MG/DL
POTASSIUM SERPL-SCNC: 4.2 MMOL/L (ref 3.5–5.3)
SODIUM SERPL-SCNC: 140 MMOL/L (ref 136–145)
TRIGL SERPL-MCNC: 95 MG/DL

## 2019-10-01 PROCEDURE — 80061 LIPID PANEL: CPT

## 2019-10-01 PROCEDURE — 83036 HEMOGLOBIN GLYCOSYLATED A1C: CPT

## 2019-10-01 PROCEDURE — 36415 COLL VENOUS BLD VENIPUNCTURE: CPT

## 2019-10-01 PROCEDURE — 80048 BASIC METABOLIC PNL TOTAL CA: CPT

## 2019-10-16 ENCOUNTER — OFFICE VISIT (OUTPATIENT)
Dept: INTERNAL MEDICINE CLINIC | Facility: CLINIC | Age: 69
End: 2019-10-16
Payer: MEDICARE

## 2019-10-16 VITALS
HEART RATE: 60 BPM | WEIGHT: 179.4 LBS | RESPIRATION RATE: 12 BRPM | HEIGHT: 73 IN | SYSTOLIC BLOOD PRESSURE: 120 MMHG | BODY MASS INDEX: 23.78 KG/M2 | DIASTOLIC BLOOD PRESSURE: 82 MMHG

## 2019-10-16 DIAGNOSIS — I10 BENIGN ESSENTIAL HYPERTENSION: ICD-10-CM

## 2019-10-16 DIAGNOSIS — R73.9 HYPERGLYCEMIA: ICD-10-CM

## 2019-10-16 DIAGNOSIS — E78.5 BORDERLINE HYPERLIPIDEMIA: Primary | ICD-10-CM

## 2019-10-16 PROCEDURE — 99214 OFFICE O/P EST MOD 30 MIN: CPT | Performed by: INTERNAL MEDICINE

## 2019-10-16 RX ORDER — ROSUVASTATIN CALCIUM 5 MG/1
5 TABLET, COATED ORAL DAILY
Qty: 30 TABLET | Refills: 5 | Status: SHIPPED | OUTPATIENT
Start: 2019-10-16 | End: 2020-03-23

## 2019-10-16 NOTE — PROGRESS NOTES
Assessment/Plan:  His LDL cholesterol is on the rise  He has a mother who had multiple strokes  He has pre diabetes  For that reason I am going to put him on Crestor 5 mg per day  Will recheck him in 4 months  Side effects of medicines were stressed to the patient  I think he will do better having a statin on board to lower his LDL cholesterol  As always should he need to see me or developed any cardiac symptoms he will come in     1  Borderline hyperlipidemia  rosuvastatin (CRESTOR) 5 mg tablet    Comprehensive metabolic panel    Lipid panel   2  Hyperglycemia     3  Benign essential hypertension         Orders Placed This Encounter   Procedures    Comprehensive metabolic panel    Lipid panel         Subjective:  He comes in for follow-up  He physically feels fairly well but has been under lot of stress  Daughter had a baby but also had a very large ovarian mass which apparently was pre malignant  In any case his blood pressure is actually good at home and he has no cardiac complaints but his and he has pre diabetes  Patient ID: Reema Roldan is a 80 y o  male  HPI    The following portions of the patient's history were reviewed and updated as appropriate:   He has a past medical history of Benign neoplasm of large intestine, Hemorrhoids, Inflamed seborrheic keratosis, Multiple benign polyps of large intestine, Neoplasm of uncertain behavior of skin, and Sebaceous cyst ,  does not have any pertinent problems on file  ,   has a past surgical history that includes Knee arthroscopy (Left); Hand surgery (Left); Inguinal hernia repair; and Tonsillectomy  ,  family history includes Other in his father; Stroke in his mother  ,   reports that he quit smoking about 41 years ago  His smoking use included cigarettes  He has a 2 50 pack-year smoking history  He has never used smokeless tobacco  He reports that he drank alcohol  He reports that he does not use drugs  ,  has No Known Allergies       Current Outpatient Medications:     irbesartan-hydrochlorothiazide (AVALIDE) 150-12 5 MG per tablet, Take 1 tablet by mouth daily, Disp: 30 tablet, Rfl: 5    lisinopril-hydrochlorothiazide (PRINZIDE,ZESTORETIC) 20-12 5 MG per tablet, Take 1 tablet by mouth daily, Disp: 30 tablet, Rfl: 5    rosuvastatin (CRESTOR) 5 mg tablet, Take 1 tablet (5 mg total) by mouth daily, Disp: 30 tablet, Rfl: 5    Review of Systems   Constitutional: Negative for activity change, appetite change, chills, diaphoresis, fatigue, fever and unexpected weight change  HENT: Negative for congestion, ear pain, hearing loss, mouth sores, nosebleeds, postnasal drip, sinus pressure, sinus pain, sore throat and trouble swallowing  Eyes: Negative for pain, discharge and visual disturbance  Respiratory: Negative for apnea, cough, chest tightness, shortness of breath and wheezing  Cardiovascular: Negative for chest pain, palpitations and leg swelling  Gastrointestinal: Negative for abdominal pain, anal bleeding, blood in stool, constipation, diarrhea, nausea and vomiting  Endocrine: Negative for polydipsia and polyphagia  He has pre diabetes  A1c was 5 6   Genitourinary: Negative for decreased urine volume, dysuria, flank pain, frequency, hematuria and urgency  Musculoskeletal: Negative for arthralgias, back pain, gait problem, joint swelling and myalgias  Skin: Negative for rash and wound  Allergic/Immunologic: Negative for environmental allergies and food allergies  Neurological: Negative for dizziness, tremors, seizures, syncope, speech difficulty, light-headedness, numbness and headaches  Hematological: Negative for adenopathy  Does not bruise/bleed easily  Psychiatric/Behavioral: Negative for agitation, confusion, hallucinations, sleep disturbance and suicidal ideas  The patient is not nervous/anxious            Objective:  /82 (BP Location: Left arm, Patient Position: Sitting)   Pulse 60   Resp 12   Ht 6' 1" (1 854 m)   Wt 81 4 kg (179 lb 6 4 oz)   BMI 23 67 kg/m²      Physical Exam   Constitutional: He appears well-developed and well-nourished  No distress  Blood pressure is 130 over 70s at home  Is elevated here at 134/80  HENT:   Head: Normocephalic  Right Ear: External ear normal    Left Ear: External ear normal    Nose: Nose normal    Mouth/Throat: Oropharynx is clear and moist  No oropharyngeal exudate  Eyes: Pupils are equal, round, and reactive to light  Conjunctivae and EOM are normal  Right eye exhibits no discharge  Left eye exhibits no discharge  Neck: Normal range of motion  Neck supple  No thyromegaly present  Cardiovascular: Normal rate, regular rhythm, normal heart sounds and intact distal pulses  Exam reveals no gallop and no friction rub  No murmur heard  Pulmonary/Chest: Effort normal and breath sounds normal  No respiratory distress  He has no wheezes  He has no rales  Abdominal: Soft  Bowel sounds are normal  He exhibits no distension and no mass  There is no tenderness  There is no rebound and no guarding  Musculoskeletal: Normal range of motion  He exhibits no edema, tenderness or deformity  Lymphadenopathy:     He has no cervical adenopathy  Neurological: He is alert  He has normal reflexes  He displays normal reflexes  No cranial nerve deficit  Coordination normal    Skin: Skin is warm and dry  No rash noted  No erythema  Psychiatric: He has a normal mood and affect  His behavior is normal  Judgment and thought content normal    Nursing note and vitals reviewed          Recent Results (from the past 1008 hour(s))   Basic metabolic panel    Collection Time: 10/01/19  7:52 AM   Result Value Ref Range    Sodium 140 136 - 145 mmol/L    Potassium 4 2 3 5 - 5 3 mmol/L    Chloride 106 100 - 108 mmol/L    CO2 30 21 - 32 mmol/L    ANION GAP 4 4 - 13 mmol/L    BUN 22 5 - 25 mg/dL    Creatinine 1 17 0 60 - 1 30 mg/dL    Glucose, Fasting 106 (H) 65 - 99 mg/dL    Calcium 9 1 8 3 - 10 1 mg/dL    eGFR 64 ml/min/1 73sq m   Lipid panel    Collection Time: 10/01/19  7:52 AM   Result Value Ref Range    Cholesterol 188 50 - 200 mg/dL    Triglycerides 95 <=150 mg/dL    HDL, Direct 44 40 - 60 mg/dL    LDL Calculated 125 (H) 0 - 100 mg/dL    Non-HDL-Chol (CHOL-HDL) 144 mg/dl   HEMOGLOBIN A1C W/ EAG ESTIMATION    Collection Time: 10/01/19  7:52 AM   Result Value Ref Range    Hemoglobin A1C 5 6 4 2 - 6 3 %     mg/dl

## 2019-11-07 ENCOUNTER — TELEPHONE (OUTPATIENT)
Dept: INTERNAL MEDICINE CLINIC | Facility: CLINIC | Age: 69
End: 2019-11-07

## 2019-11-07 NOTE — TELEPHONE ENCOUNTER
Patient has been on crestor 5mg   For a few wks now, past two days his wrist is swollen, he is not sure if that is the cause    Call back # [phone]

## 2019-12-18 DIAGNOSIS — I10 ESSENTIAL HYPERTENSION: ICD-10-CM

## 2019-12-18 RX ORDER — LISINOPRIL AND HYDROCHLOROTHIAZIDE 20; 12.5 MG/1; MG/1
TABLET ORAL
Qty: 30 TABLET | Refills: 5 | Status: SHIPPED | OUTPATIENT
Start: 2019-12-18 | End: 2020-02-24 | Stop reason: SINTOL

## 2020-02-18 ENCOUNTER — TRANSCRIBE ORDERS (OUTPATIENT)
Dept: LAB | Facility: CLINIC | Age: 70
End: 2020-02-18

## 2020-02-18 ENCOUNTER — APPOINTMENT (OUTPATIENT)
Dept: LAB | Facility: CLINIC | Age: 70
End: 2020-02-18
Payer: MEDICARE

## 2020-02-18 DIAGNOSIS — E78.5 BORDERLINE HYPERLIPIDEMIA: ICD-10-CM

## 2020-02-18 DIAGNOSIS — R97.20 ELEVATED PROSTATE SPECIFIC ANTIGEN (PSA): Primary | ICD-10-CM

## 2020-02-18 DIAGNOSIS — R97.20 ELEVATED PROSTATE SPECIFIC ANTIGEN (PSA): ICD-10-CM

## 2020-02-18 LAB
ALBUMIN SERPL BCP-MCNC: 3.7 G/DL (ref 3.5–5)
ALP SERPL-CCNC: 81 U/L (ref 46–116)
ALT SERPL W P-5'-P-CCNC: 27 U/L (ref 12–78)
ANION GAP SERPL CALCULATED.3IONS-SCNC: 2 MMOL/L (ref 4–13)
AST SERPL W P-5'-P-CCNC: 15 U/L (ref 5–45)
BILIRUB SERPL-MCNC: 1.18 MG/DL (ref 0.2–1)
BUN SERPL-MCNC: 17 MG/DL (ref 5–25)
CALCIUM SERPL-MCNC: 9.2 MG/DL (ref 8.3–10.1)
CHLORIDE SERPL-SCNC: 106 MMOL/L (ref 100–108)
CHOLEST SERPL-MCNC: 128 MG/DL (ref 50–200)
CO2 SERPL-SCNC: 31 MMOL/L (ref 21–32)
CREAT SERPL-MCNC: 1.08 MG/DL (ref 0.6–1.3)
GFR SERPL CREATININE-BSD FRML MDRD: 70 ML/MIN/1.73SQ M
GLUCOSE P FAST SERPL-MCNC: 101 MG/DL (ref 65–99)
HDLC SERPL-MCNC: 43 MG/DL
LDLC SERPL CALC-MCNC: 66 MG/DL (ref 0–100)
NONHDLC SERPL-MCNC: 85 MG/DL
POTASSIUM SERPL-SCNC: 4.2 MMOL/L (ref 3.5–5.3)
PROT SERPL-MCNC: 7.2 G/DL (ref 6.4–8.2)
PSA SERPL-MCNC: 5.8 NG/ML (ref 0–4)
SODIUM SERPL-SCNC: 139 MMOL/L (ref 136–145)
TRIGL SERPL-MCNC: 95 MG/DL

## 2020-02-18 PROCEDURE — 80061 LIPID PANEL: CPT

## 2020-02-18 PROCEDURE — 80053 COMPREHEN METABOLIC PANEL: CPT

## 2020-02-18 PROCEDURE — 36415 COLL VENOUS BLD VENIPUNCTURE: CPT

## 2020-02-18 PROCEDURE — 84153 ASSAY OF PSA TOTAL: CPT

## 2020-02-24 ENCOUNTER — OFFICE VISIT (OUTPATIENT)
Dept: INTERNAL MEDICINE CLINIC | Facility: CLINIC | Age: 70
End: 2020-02-24
Payer: MEDICARE

## 2020-02-24 VITALS
OXYGEN SATURATION: 97 % | DIASTOLIC BLOOD PRESSURE: 70 MMHG | HEART RATE: 67 BPM | HEIGHT: 73 IN | SYSTOLIC BLOOD PRESSURE: 138 MMHG | WEIGHT: 181.2 LBS | BODY MASS INDEX: 24.01 KG/M2

## 2020-02-24 DIAGNOSIS — Z86.010 HISTORY OF COLON POLYPS: ICD-10-CM

## 2020-02-24 DIAGNOSIS — I10 BENIGN ESSENTIAL HYPERTENSION: Primary | ICD-10-CM

## 2020-02-24 DIAGNOSIS — R73.9 HYPERGLYCEMIA: ICD-10-CM

## 2020-02-24 DIAGNOSIS — E78.5 BORDERLINE HYPERLIPIDEMIA: ICD-10-CM

## 2020-02-24 PROCEDURE — 1036F TOBACCO NON-USER: CPT | Performed by: INTERNAL MEDICINE

## 2020-02-24 PROCEDURE — 1125F AMNT PAIN NOTED PAIN PRSNT: CPT | Performed by: INTERNAL MEDICINE

## 2020-02-24 PROCEDURE — 1160F RVW MEDS BY RX/DR IN RCRD: CPT | Performed by: INTERNAL MEDICINE

## 2020-02-24 PROCEDURE — 3078F DIAST BP <80 MM HG: CPT | Performed by: INTERNAL MEDICINE

## 2020-02-24 PROCEDURE — 99213 OFFICE O/P EST LOW 20 MIN: CPT | Performed by: INTERNAL MEDICINE

## 2020-02-24 PROCEDURE — 1170F FXNL STATUS ASSESSED: CPT | Performed by: INTERNAL MEDICINE

## 2020-02-24 PROCEDURE — 3075F SYST BP GE 130 - 139MM HG: CPT | Performed by: INTERNAL MEDICINE

## 2020-02-24 PROCEDURE — 4040F PNEUMOC VAC/ADMIN/RCVD: CPT | Performed by: INTERNAL MEDICINE

## 2020-02-24 PROCEDURE — G0438 PPPS, INITIAL VISIT: HCPCS | Performed by: INTERNAL MEDICINE

## 2020-02-24 RX ORDER — LOSARTAN POTASSIUM AND HYDROCHLOROTHIAZIDE 12.5; 5 MG/1; MG/1
1 TABLET ORAL DAILY
Qty: 90 TABLET | Refills: 10 | Status: SHIPPED | OUTPATIENT
Start: 2020-02-24 | End: 2021-05-25 | Stop reason: SDUPTHER

## 2020-02-24 NOTE — PROGRESS NOTES
Assessment and Plan:  Patient in for wellness visit  Questions reviewed  Problem List Items Addressed This Visit     None           Preventive health issues were discussed with patient, and age appropriate screening tests were ordered as noted in patient's After Visit Summary  Personalized health advice and appropriate referrals for health education or preventive services given if needed, as noted in patient's After Visit Summary       History of Present Illness:     Patient presents for Medicare Annual Wellness visit    Patient Care Team:  DO stephenie Santillan PCP - General     Problem List:     Patient Active Problem List   Diagnosis    Benign essential hypertension    Borderline hyperlipidemia    Hyperglycemia    History of colon polyps      Past Medical and Surgical History:     Past Medical History:   Diagnosis Date    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    Hemorrhoids     Inflamed seborrheic keratosis     Multiple benign polyps of large intestine     History of personal colonic polyps    Neoplasm of uncertain behavior of skin     Sebaceous cyst      Past Surgical History:   Procedure Laterality Date    HAND SURGERY Left     thumb ligament repair    INGUINAL HERNIA REPAIR      KNEE ARTHROSCOPY Left     TONSILLECTOMY        Family History:     Family History   Problem Relation Age of Onset    Stroke Mother         stroke syndrome    Other Father         hematologic disorder      Social History:        Social History     Socioeconomic History    Marital status: /Civil Union     Spouse name: None    Number of children: None    Years of education: None    Highest education level: None   Occupational History    None   Social Needs    Financial resource strain: None    Food insecurity:     Worry: None     Inability: None    Transportation needs:     Medical: None     Non-medical: None   Tobacco Use    Smoking status: Former Smoker     Packs/day: 0 50 Years: 5 00     Pack years: 2 50     Types: Cigarettes     Last attempt to quit:      Years since quittin 1    Smokeless tobacco: Never Used   Substance and Sexual Activity    Alcohol use: Not Currently     Frequency: Monthly or less     Drinks per session: 1 or 2    Drug use: No    Sexual activity: Yes     Partners: Female   Lifestyle    Physical activity:     Days per week: 7 days     Minutes per session: 30 min    Stress: Not at all   Relationships    Social connections:     Talks on phone: None     Gets together: None     Attends Christianity service: None     Active member of club or organization: None     Attends meetings of clubs or organizations: None     Relationship status: None    Intimate partner violence:     Fear of current or ex partner: None     Emotionally abused: None     Physically abused: None     Forced sexual activity: None   Other Topics Concern    None   Social History Narrative    Active advance directive      Medications and Allergies:     Current Outpatient Medications   Medication Sig Dispense Refill    lisinopril-hydrochlorothiazide (PRINZIDE,ZESTORETIC) 20-12 5 MG per tablet TAKE 1 TABLET BY MOUTH EVERY DAY 30 tablet 5    rosuvastatin (CRESTOR) 5 mg tablet Take 1 tablet (5 mg total) by mouth daily 30 tablet 5     No current facility-administered medications for this visit        No Known Allergies   Immunizations:     Immunization History   Administered Date(s) Administered    INFLUENZA 10/14/2013    Influenza Split High Dose Preservative Free IM 10/07/2017, 10/19/2019    Influenza TIV (IM) 10/01/2015, 10/01/2016    Pneumococcal Conjugate 13-Valent 2018    Pneumococcal Polysaccharide PPV23 2018    Tdap 1950    Zoster 2014    Zoster Vaccine Recombinant 10/24/2019, 2020      Health Maintenance:         Topic Date Due    Hepatitis C Screening  1950    CRC Screening: Colonoscopy  2020         Topic Date Due    DTaP,Tdap,and Td Vaccines (1 - Tdap) 11/02/1962      Medicare Health Risk Assessment:     /70 (BP Location: Left arm, Patient Position: Sitting, Cuff Size: Standard)   Pulse 67   Ht 6' 1" (1 854 m)   Wt 82 2 kg (181 lb 3 2 oz)   SpO2 97%   BMI 23 91 kg/m²      Otoniel Gardner is here for his Subsequent Wellness visit  Health Risk Assessment:   Patient rates overall health as very good  Patient feels that their physical health rating is same  Eyesight was rated as same  Hearing was rated as same  Patient feels that their emotional and mental health rating is same  Pain experienced in the last 7 days has been some  Patient's pain rating has been 2/10  Patient states that he has experienced no weight loss or gain in last 6 months  Fall Risk Screening: In the past year, patient has experienced: no history of falling in past year      Home Safety:  Patient does not have trouble with stairs inside or outside of their home  Patient has working smoke alarms and has working carbon monoxide detector  Home safety hazards include: none  Nutrition:   Current diet is Regular, Limited junk food, Low Saturated Fat and Low Cholesterol  Medications:   Patient is not currently taking any over-the-counter supplements  Patient is able to manage medications  Activities of Daily Living (ADLs)/Instrumental Activities of Daily Living (IADLs):   Walk and transfer into and out of bed and chair?: Yes  Dress and groom yourself?: Yes    Bathe or shower yourself?: Yes    Feed yourself? Yes  Do your laundry/housekeeping?: Yes  Manage your money, pay your bills and track your expenses?: Yes  Make your own meals?: Yes    Do your own shopping?: Yes    Previous Hospitalizations:   Any hospitalizations or ED visits within the last 12 months?: No      Advance Care Planning:   Living will: Yes    Durable POA for healthcare:  Yes    Advanced directive: Yes    End of Life Decisions reviewed with patient: Yes      PREVENTIVE SCREENINGS      Cardiovascular Screening:    General: Screening Not Indicated and History Lipid Disorder      Diabetes Screening:     General: Screening Current      Colorectal Cancer Screening:     General: Screening Current      Prostate Cancer Screening:    General: Screening Current      Abdominal Aortic Aneurysm (AAA) Screening:    Risk factors include: age between 73-67 yo and tobacco use        Ibrahima Fowler DO

## 2020-02-26 NOTE — PROGRESS NOTES
Assessment/Plan:  Cough secondary to lisinopril  Will switch to Hyzaar 50/12  5  Monitor blood pressures  Recheck patient in a month or 2    1  Benign essential hypertension  losartan-hydrochlorothiazide (HYZAAR) 50-12 5 mg per tablet    CBC and differential    Comprehensive metabolic panel   2  Borderline hyperlipidemia     3  Hyperglycemia  HEMOGLOBIN A1C W/ EAG ESTIMATION    Lipid panel   4  History of colon polyps         Orders Placed This Encounter   Procedures    CBC and differential    Comprehensive metabolic panel    HEMOGLOBIN A1C W/ EAG ESTIMATION    Lipid panel            Subjective:  Cough     Patient ID: German Jimenez is a 77 y o  male  HPI he has hypertension which has been stable  He also has hyperlipidemia  He has trouble with a annoying cough which probably is secondary to his lisinopril  No chest pain  The following portions of the patient's history were reviewed and updated as appropriate:   He has a past medical history of Benign neoplasm of large intestine, Hemorrhoids, Inflamed seborrheic keratosis, Multiple benign polyps of large intestine, Neoplasm of uncertain behavior of skin, and Sebaceous cyst ,  does not have any pertinent problems on file  ,   has a past surgical history that includes Knee arthroscopy (Left); Hand surgery (Left); Inguinal hernia repair; and Tonsillectomy  ,  family history includes Other in his father; Stroke in his mother  ,   reports that he quit smoking about 42 years ago  His smoking use included cigarettes  He has a 2 50 pack-year smoking history  He has never used smokeless tobacco  He reports that he drank alcohol  He reports that he does not use drugs  ,  has No Known Allergies       Current Outpatient Medications:     rosuvastatin (CRESTOR) 5 mg tablet, Take 1 tablet (5 mg total) by mouth daily, Disp: 30 tablet, Rfl: 5    losartan-hydrochlorothiazide (HYZAAR) 50-12 5 mg per tablet, Take 1 tablet by mouth daily, Disp: 90 tablet, Rfl: 10    Review of Systems  he only has a cough  No exertional chest pain pressure squeezing or tightness  Objective:  /70 (BP Location: Left arm, Patient Position: Sitting, Cuff Size: Standard)   Pulse 67   Ht 6' 1" (1 854 m)   Wt 82 2 kg (181 lb 3 2 oz)   SpO2 97%   BMI 23 91 kg/m²      Physical Exam  ear nose and throat exam was normal at the present time  Chest was clear  Heart rhythm was regular without murmur or gallop      Recent Results (from the past 1008 hour(s))   Comprehensive metabolic panel    Collection Time: 02/18/20  8:28 AM   Result Value Ref Range    Sodium 139 136 - 145 mmol/L    Potassium 4 2 3 5 - 5 3 mmol/L    Chloride 106 100 - 108 mmol/L    CO2 31 21 - 32 mmol/L    ANION GAP 2 (L) 4 - 13 mmol/L    BUN 17 5 - 25 mg/dL    Creatinine 1 08 0 60 - 1 30 mg/dL    Glucose, Fasting 101 (H) 65 - 99 mg/dL    Calcium 9 2 8 3 - 10 1 mg/dL    AST 15 5 - 45 U/L    ALT 27 12 - 78 U/L    Alkaline Phosphatase 81 46 - 116 U/L    Total Protein 7 2 6 4 - 8 2 g/dL    Albumin 3 7 3 5 - 5 0 g/dL    Total Bilirubin 1 18 (H) 0 20 - 1 00 mg/dL    eGFR 70 ml/min/1 73sq m   Lipid panel    Collection Time: 02/18/20  8:28 AM   Result Value Ref Range    Cholesterol 128 50 - 200 mg/dL    Triglycerides 95 <=150 mg/dL    HDL, Direct 43 >=40 mg/dL    LDL Calculated 66 0 - 100 mg/dL    Non-HDL-Chol (CHOL-HDL) 85 mg/dl   PSA Total, Diagnostic    Collection Time: 02/18/20  8:28 AM   Result Value Ref Range    PSA, Diagnostic 5 8 (H) 0 0 - 4 0 ng/mL

## 2020-03-23 DIAGNOSIS — E78.5 BORDERLINE HYPERLIPIDEMIA: ICD-10-CM

## 2020-03-23 RX ORDER — ROSUVASTATIN CALCIUM 5 MG/1
TABLET, COATED ORAL
Qty: 30 TABLET | Refills: 5 | Status: SHIPPED | OUTPATIENT
Start: 2020-03-23 | End: 2020-09-18

## 2020-08-27 ENCOUNTER — APPOINTMENT (OUTPATIENT)
Dept: LAB | Facility: CLINIC | Age: 70
End: 2020-08-27
Payer: MEDICARE

## 2020-08-27 DIAGNOSIS — R73.9 HYPERGLYCEMIA: ICD-10-CM

## 2020-08-27 DIAGNOSIS — I10 BENIGN ESSENTIAL HYPERTENSION: ICD-10-CM

## 2020-08-27 LAB
ALBUMIN SERPL BCP-MCNC: 3.9 G/DL (ref 3.5–5)
ALP SERPL-CCNC: 86 U/L (ref 46–116)
ALT SERPL W P-5'-P-CCNC: 22 U/L (ref 12–78)
ANION GAP SERPL CALCULATED.3IONS-SCNC: 3 MMOL/L (ref 4–13)
AST SERPL W P-5'-P-CCNC: 17 U/L (ref 5–45)
BASOPHILS # BLD AUTO: 0.04 THOUSANDS/ΜL (ref 0–0.1)
BASOPHILS NFR BLD AUTO: 1 % (ref 0–1)
BILIRUB SERPL-MCNC: 1.81 MG/DL (ref 0.2–1)
BUN SERPL-MCNC: 19 MG/DL (ref 5–25)
CALCIUM SERPL-MCNC: 9 MG/DL (ref 8.3–10.1)
CHLORIDE SERPL-SCNC: 106 MMOL/L (ref 100–108)
CHOLEST SERPL-MCNC: 153 MG/DL (ref 50–200)
CO2 SERPL-SCNC: 30 MMOL/L (ref 21–32)
CREAT SERPL-MCNC: 1.14 MG/DL (ref 0.6–1.3)
EOSINOPHIL # BLD AUTO: 0.1 THOUSAND/ΜL (ref 0–0.61)
EOSINOPHIL NFR BLD AUTO: 2 % (ref 0–6)
ERYTHROCYTE [DISTWIDTH] IN BLOOD BY AUTOMATED COUNT: 12.7 % (ref 11.6–15.1)
EST. AVERAGE GLUCOSE BLD GHB EST-MCNC: 120 MG/DL
GFR SERPL CREATININE-BSD FRML MDRD: 66 ML/MIN/1.73SQ M
GLUCOSE P FAST SERPL-MCNC: 108 MG/DL (ref 65–99)
HBA1C MFR BLD: 5.8 %
HCT VFR BLD AUTO: 47.1 % (ref 36.5–49.3)
HDLC SERPL-MCNC: 47 MG/DL
HGB BLD-MCNC: 15.7 G/DL (ref 12–17)
IMM GRANULOCYTES # BLD AUTO: 0.01 THOUSAND/UL (ref 0–0.2)
IMM GRANULOCYTES NFR BLD AUTO: 0 % (ref 0–2)
LDLC SERPL CALC-MCNC: 87 MG/DL (ref 0–100)
LYMPHOCYTES # BLD AUTO: 1.25 THOUSANDS/ΜL (ref 0.6–4.47)
LYMPHOCYTES NFR BLD AUTO: 27 % (ref 14–44)
MCH RBC QN AUTO: 30.3 PG (ref 26.8–34.3)
MCHC RBC AUTO-ENTMCNC: 33.3 G/DL (ref 31.4–37.4)
MCV RBC AUTO: 91 FL (ref 82–98)
MONOCYTES # BLD AUTO: 0.42 THOUSAND/ΜL (ref 0.17–1.22)
MONOCYTES NFR BLD AUTO: 9 % (ref 4–12)
NEUTROPHILS # BLD AUTO: 2.81 THOUSANDS/ΜL (ref 1.85–7.62)
NEUTS SEG NFR BLD AUTO: 61 % (ref 43–75)
NONHDLC SERPL-MCNC: 106 MG/DL
NRBC BLD AUTO-RTO: 0 /100 WBCS
PLATELET # BLD AUTO: 233 THOUSANDS/UL (ref 149–390)
PMV BLD AUTO: 8.6 FL (ref 8.9–12.7)
POTASSIUM SERPL-SCNC: 4.7 MMOL/L (ref 3.5–5.3)
PROT SERPL-MCNC: 7.4 G/DL (ref 6.4–8.2)
RBC # BLD AUTO: 5.19 MILLION/UL (ref 3.88–5.62)
SODIUM SERPL-SCNC: 139 MMOL/L (ref 136–145)
TRIGL SERPL-MCNC: 97 MG/DL
WBC # BLD AUTO: 4.63 THOUSAND/UL (ref 4.31–10.16)

## 2020-08-27 PROCEDURE — 36415 COLL VENOUS BLD VENIPUNCTURE: CPT

## 2020-08-27 PROCEDURE — 80053 COMPREHEN METABOLIC PANEL: CPT

## 2020-08-27 PROCEDURE — 80061 LIPID PANEL: CPT

## 2020-08-27 PROCEDURE — 83036 HEMOGLOBIN GLYCOSYLATED A1C: CPT

## 2020-08-27 PROCEDURE — 85025 COMPLETE CBC W/AUTO DIFF WBC: CPT

## 2020-09-01 ENCOUNTER — APPOINTMENT (OUTPATIENT)
Dept: LAB | Facility: CLINIC | Age: 70
End: 2020-09-01
Payer: MEDICARE

## 2020-09-01 ENCOUNTER — OFFICE VISIT (OUTPATIENT)
Dept: INTERNAL MEDICINE CLINIC | Facility: CLINIC | Age: 70
End: 2020-09-01
Payer: MEDICARE

## 2020-09-01 VITALS
WEIGHT: 181.6 LBS | TEMPERATURE: 96.8 F | SYSTOLIC BLOOD PRESSURE: 154 MMHG | RESPIRATION RATE: 12 BRPM | DIASTOLIC BLOOD PRESSURE: 88 MMHG | HEIGHT: 73 IN | BODY MASS INDEX: 24.07 KG/M2 | HEART RATE: 72 BPM

## 2020-09-01 DIAGNOSIS — I10 BENIGN ESSENTIAL HYPERTENSION: ICD-10-CM

## 2020-09-01 DIAGNOSIS — R17 ELEVATED BILIRUBIN: ICD-10-CM

## 2020-09-01 DIAGNOSIS — E78.5 BORDERLINE HYPERLIPIDEMIA: ICD-10-CM

## 2020-09-01 DIAGNOSIS — R73.9 HYPERGLYCEMIA: Primary | ICD-10-CM

## 2020-09-01 LAB
BILIRUB DIRECT SERPL-MCNC: 0.19 MG/DL (ref 0–0.2)
BILIRUB SERPL-MCNC: 0.98 MG/DL (ref 0.2–1)
HBV SURFACE AB SER-ACNC: <3.1 MIU/ML
HBV SURFACE AG SER QL: NORMAL
HCV AB SER QL: NORMAL

## 2020-09-01 PROCEDURE — 36415 COLL VENOUS BLD VENIPUNCTURE: CPT

## 2020-09-01 PROCEDURE — 99214 OFFICE O/P EST MOD 30 MIN: CPT | Performed by: INTERNAL MEDICINE

## 2020-09-01 PROCEDURE — 82248 BILIRUBIN DIRECT: CPT

## 2020-09-01 PROCEDURE — 86803 HEPATITIS C AB TEST: CPT

## 2020-09-01 PROCEDURE — 82247 BILIRUBIN TOTAL: CPT

## 2020-09-01 PROCEDURE — 86706 HEP B SURFACE ANTIBODY: CPT

## 2020-09-01 PROCEDURE — 87340 HEPATITIS B SURFACE AG IA: CPT

## 2020-09-08 ENCOUNTER — OFFICE VISIT (OUTPATIENT)
Dept: GASTROENTEROLOGY | Facility: CLINIC | Age: 70
End: 2020-09-08
Payer: MEDICARE

## 2020-09-08 VITALS
DIASTOLIC BLOOD PRESSURE: 72 MMHG | BODY MASS INDEX: 24.12 KG/M2 | HEIGHT: 73 IN | SYSTOLIC BLOOD PRESSURE: 140 MMHG | TEMPERATURE: 97.3 F | WEIGHT: 182 LBS

## 2020-09-08 DIAGNOSIS — Z86.010 HISTORY OF COLON POLYPS: Primary | ICD-10-CM

## 2020-09-08 PROCEDURE — 99203 OFFICE O/P NEW LOW 30 MIN: CPT | Performed by: PHYSICIAN ASSISTANT

## 2020-09-08 NOTE — PROGRESS NOTES
[facility] - Outpatient Consultation  Kathia Bolaños 77 y o  male MRN: [id number]  Encounter: [id number]          ASSESSMENT AND PLAN:      1  History of colon polyps    Patient presents to schedule his next colonoscopy  Last colonoscopy was 8/28/2017 and 5 polyps were removed  Will plan for colonoscopy to investigate   ______________________________________________________________________    HPI:  Patient is a 42-year-old male who presents to the office to schedule his next colonoscopy  He has a history of colon polyps  His last colonoscopy was on August 28, 2017 and 5 polyps were removed at that time  He denies any rectal bleeding or melena  He denies any changes in his bowel movements  He denies abdominal pain  He reports he tolerated the procedure and bowel prep well last time without any difficulties  He denies any family history of colon cancer  REVIEW OF SYSTEMS:    CONSTITUTIONAL: Denies any fever, chills, rigors, and weight loss  HEENT: No earache or tinnitus  Denies hearing loss or visual disturbances  CARDIOVASCULAR: No chest pain or palpitations  RESPIRATORY: Denies any cough, hemoptysis, shortness of breath or dyspnea on exertion  GASTROINTESTINAL: As noted in the History of Present Illness  GENITOURINARY: No problems with urination  Denies any hematuria or dysuria  NEUROLOGIC: No dizziness or vertigo, denies headaches  MUSCULOSKELETAL: Denies any muscle or joint pain  SKIN: Denies skin rashes or itching  ENDOCRINE: Denies excessive thirst  Denies intolerance to heat or cold  PSYCHOSOCIAL: Denies depression or anxiety  Denies any recent memory loss         Historical Information   Past Medical History:   Diagnosis Date    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    Hemorrhoids     Inflamed seborrheic keratosis     Multiple benign polyps of large intestine     History of personal colonic polyps    Neoplasm of uncertain behavior of skin     Sebaceous cyst      Past Surgical History:   Procedure Laterality Date    HAND SURGERY Left     thumb ligament repair    INGUINAL HERNIA REPAIR      KNEE ARTHROSCOPY Left     TONSILLECTOMY       Social History   Social History     Substance and Sexual Activity   Alcohol Use Not Currently    Frequency: Monthly or less    Drinks per session: 1 or 2     Social History     Substance and Sexual Activity   Drug Use No     Social History     Tobacco Use   Smoking Status Former Smoker    Packs/day: 0 50    Years: 5 00    Pack years: 2 50    Types: Cigarettes    Last attempt to quit:     Years since quittin 7   Smokeless Tobacco Never Used     Family History   Problem Relation Age of Onset    Stroke Mother         stroke syndrome    Other Father         hematologic disorder       Meds/Allergies       Current Outpatient Medications:     losartan-hydrochlorothiazide (HYZAAR) 50-12 5 mg per tablet    rosuvastatin (CRESTOR) 5 mg tablet    No Known Allergies        Objective     Blood pressure 140/72, temperature (!) 97 3 °F (36 3 °C), height 6' 1" (1 854 m), weight 82 6 kg (182 lb)  Body mass index is 24 01 kg/m²  PHYSICAL EXAM:      General Appearance:   Alert, cooperative, no distress   HEENT:   Normocephalic, atraumatic, anicteric    Neck:  Supple, symmetrical, trachea midline   Lungs:   Clear to auscultation bilaterally; no rales, rhonchi or wheezing; respirations unlabored    Heart[de-identified]   Regular rate and rhythm; no murmur, rub, or gallop  Abdomen:   Soft, non-tender, non-distended; normal bowel sounds; no masses, no organomegaly    Genitalia:   Deferred    Rectal:   Deferred    Extremities:  No cyanosis, clubbing or edema    Pulses:  2+ and symmetric    Skin:  No jaundice, rashes, or lesions    Lymph nodes:  No palpable cervical lymphadenopathy        Lab Results:   No visits with results within 1 Day(s) from this visit     Latest known visit with results is: Appointment on 09/01/2020   Component Date Value    Hepatitis C Ab 09/01/2020 Non-reactive     Hep B S Ab 09/01/2020 <3 10     Hepatitis B Surface Ag 09/01/2020 Non-reactive     Bilirubin, Direct 09/01/2020 0 19     Total Bilirubin 09/01/2020 0 98          Radiology Results:   No results found

## 2020-09-11 ENCOUNTER — TELEPHONE (OUTPATIENT)
Dept: GASTROENTEROLOGY | Facility: CLINIC | Age: 70
End: 2020-09-11

## 2020-09-18 ENCOUNTER — HOSPITAL ENCOUNTER (OUTPATIENT)
Dept: ULTRASOUND IMAGING | Facility: HOSPITAL | Age: 70
Discharge: HOME | End: 2020-09-18
Attending: INTERNAL MEDICINE
Payer: MEDICARE

## 2020-09-18 DIAGNOSIS — R17 ELEVATED BILIRUBIN: ICD-10-CM

## 2020-09-18 DIAGNOSIS — E78.5 BORDERLINE HYPERLIPIDEMIA: ICD-10-CM

## 2020-09-18 PROCEDURE — 76705 ECHO EXAM OF ABDOMEN: CPT

## 2020-09-18 RX ORDER — ROSUVASTATIN CALCIUM 5 MG/1
TABLET, COATED ORAL
Qty: 90 TABLET | Refills: 1 | Status: SHIPPED | OUTPATIENT
Start: 2020-09-18 | End: 2021-02-08

## 2020-10-22 LAB — EXT SARS-COV-2: NOT DETECTED

## 2020-10-26 ENCOUNTER — LAB REQUISITION (OUTPATIENT)
Dept: LAB | Facility: HOSPITAL | Age: 70
End: 2020-10-26
Payer: MEDICARE

## 2020-10-26 DIAGNOSIS — K63.5 POLYP OF COLON: ICD-10-CM

## 2020-10-26 DIAGNOSIS — Z86.010 PERSONAL HISTORY OF COLONIC POLYPS: ICD-10-CM

## 2020-10-26 PROCEDURE — 88305 TISSUE EXAM BY PATHOLOGIST: CPT | Performed by: PATHOLOGY

## 2020-11-10 ENCOUNTER — TELEPHONE (OUTPATIENT)
Dept: GASTROENTEROLOGY | Facility: CLINIC | Age: 70
End: 2020-11-10

## 2020-12-22 ENCOUNTER — TELEPHONE (OUTPATIENT)
Dept: INTERNAL MEDICINE CLINIC | Facility: CLINIC | Age: 70
End: 2020-12-22

## 2020-12-23 ENCOUNTER — TELEMEDICINE (OUTPATIENT)
Dept: INTERNAL MEDICINE CLINIC | Facility: CLINIC | Age: 70
End: 2020-12-23
Payer: MEDICARE

## 2020-12-23 VITALS
BODY MASS INDEX: 23.75 KG/M2 | TEMPERATURE: 97.5 F | DIASTOLIC BLOOD PRESSURE: 75 MMHG | WEIGHT: 180 LBS | SYSTOLIC BLOOD PRESSURE: 144 MMHG

## 2020-12-23 DIAGNOSIS — I10 BENIGN ESSENTIAL HYPERTENSION: Primary | ICD-10-CM

## 2020-12-23 DIAGNOSIS — Z20.822 EXPOSURE TO COVID-19 VIRUS: ICD-10-CM

## 2020-12-23 DIAGNOSIS — E78.5 BORDERLINE HYPERLIPIDEMIA: ICD-10-CM

## 2020-12-23 PROCEDURE — 1124F ACP DISCUSS-NO DSCNMKR DOCD: CPT | Performed by: PHYSICIAN ASSISTANT

## 2020-12-23 PROCEDURE — 99212 OFFICE O/P EST SF 10 MIN: CPT | Performed by: PHYSICIAN ASSISTANT

## 2020-12-24 DIAGNOSIS — Z20.822 EXPOSURE TO COVID-19 VIRUS: ICD-10-CM

## 2020-12-24 PROCEDURE — U0003 INFECTIOUS AGENT DETECTION BY NUCLEIC ACID (DNA OR RNA); SEVERE ACUTE RESPIRATORY SYNDROME CORONAVIRUS 2 (SARS-COV-2) (CORONAVIRUS DISEASE [COVID-19]), AMPLIFIED PROBE TECHNIQUE, MAKING USE OF HIGH THROUGHPUT TECHNOLOGIES AS DESCRIBED BY CMS-2020-01-R: HCPCS | Performed by: PHYSICIAN ASSISTANT

## 2020-12-25 LAB — SARS-COV-2 RNA SPEC QL NAA+PROBE: NOT DETECTED

## 2020-12-28 ENCOUNTER — TELEPHONE (OUTPATIENT)
Dept: INTERNAL MEDICINE CLINIC | Facility: CLINIC | Age: 70
End: 2020-12-28

## 2021-01-26 ENCOUNTER — TELEPHONE (OUTPATIENT)
Dept: INTERNAL MEDICINE CLINIC | Facility: CLINIC | Age: 71
End: 2021-01-26

## 2021-01-26 DIAGNOSIS — R73.9 HYPERGLYCEMIA: Primary | ICD-10-CM

## 2021-01-26 DIAGNOSIS — E78.5 BORDERLINE HYPERLIPIDEMIA: ICD-10-CM

## 2021-01-26 NOTE — TELEPHONE ENCOUNTER
Patient has an appt w/Dr Garcia in March, to establish and is asking that he order labs for him    call and let him know when they are in

## 2021-02-08 DIAGNOSIS — E78.5 BORDERLINE HYPERLIPIDEMIA: ICD-10-CM

## 2021-02-08 RX ORDER — ROSUVASTATIN CALCIUM 5 MG/1
TABLET, COATED ORAL
Qty: 90 TABLET | Refills: 1 | Status: SHIPPED | OUTPATIENT
Start: 2021-02-08 | End: 2021-09-14

## 2021-02-17 ENCOUNTER — TRANSCRIBE ORDERS (OUTPATIENT)
Dept: LAB | Facility: CLINIC | Age: 71
End: 2021-02-17

## 2021-02-17 ENCOUNTER — LAB (OUTPATIENT)
Dept: LAB | Facility: CLINIC | Age: 71
End: 2021-02-17
Payer: COMMERCIAL

## 2021-02-17 DIAGNOSIS — R73.9 HYPERGLYCEMIA: ICD-10-CM

## 2021-02-17 DIAGNOSIS — R97.20 ELEVATED PROSTATE SPECIFIC ANTIGEN (PSA): Primary | ICD-10-CM

## 2021-02-17 DIAGNOSIS — E78.5 BORDERLINE HYPERLIPIDEMIA: ICD-10-CM

## 2021-02-17 DIAGNOSIS — R97.20 ELEVATED PROSTATE SPECIFIC ANTIGEN (PSA): ICD-10-CM

## 2021-02-17 LAB
ANION GAP SERPL CALCULATED.3IONS-SCNC: 5 MMOL/L (ref 4–13)
BUN SERPL-MCNC: 18 MG/DL (ref 5–25)
CALCIUM SERPL-MCNC: 9.6 MG/DL (ref 8.3–10.1)
CHLORIDE SERPL-SCNC: 104 MMOL/L (ref 100–108)
CHOLEST SERPL-MCNC: 154 MG/DL (ref 50–200)
CO2 SERPL-SCNC: 33 MMOL/L (ref 21–32)
CREAT SERPL-MCNC: 1.19 MG/DL (ref 0.6–1.3)
ERYTHROCYTE [DISTWIDTH] IN BLOOD BY AUTOMATED COUNT: 12.5 % (ref 11.6–15.1)
EST. AVERAGE GLUCOSE BLD GHB EST-MCNC: 120 MG/DL
GFR SERPL CREATININE-BSD FRML MDRD: 62 ML/MIN/1.73SQ M
GLUCOSE P FAST SERPL-MCNC: 116 MG/DL (ref 65–99)
HBA1C MFR BLD: 5.8 %
HCT VFR BLD AUTO: 49.2 % (ref 36.5–49.3)
HDLC SERPL-MCNC: 47 MG/DL
HGB BLD-MCNC: 16.5 G/DL (ref 12–17)
LDLC SERPL CALC-MCNC: 91 MG/DL (ref 0–100)
MCH RBC QN AUTO: 30 PG (ref 26.8–34.3)
MCHC RBC AUTO-ENTMCNC: 33.5 G/DL (ref 31.4–37.4)
MCV RBC AUTO: 90 FL (ref 82–98)
NONHDLC SERPL-MCNC: 107 MG/DL
PLATELET # BLD AUTO: 258 THOUSANDS/UL (ref 149–390)
PMV BLD AUTO: 8.4 FL (ref 8.9–12.7)
POTASSIUM SERPL-SCNC: 4 MMOL/L (ref 3.5–5.3)
PSA SERPL-MCNC: 8.2 NG/ML (ref 0–4)
RBC # BLD AUTO: 5.5 MILLION/UL (ref 3.88–5.62)
SODIUM SERPL-SCNC: 142 MMOL/L (ref 136–145)
TRIGL SERPL-MCNC: 79 MG/DL
WBC # BLD AUTO: 5.35 THOUSAND/UL (ref 4.31–10.16)

## 2021-02-17 PROCEDURE — 83036 HEMOGLOBIN GLYCOSYLATED A1C: CPT

## 2021-02-17 PROCEDURE — 80048 BASIC METABOLIC PNL TOTAL CA: CPT

## 2021-02-17 PROCEDURE — 84153 ASSAY OF PSA TOTAL: CPT

## 2021-02-17 PROCEDURE — 85027 COMPLETE CBC AUTOMATED: CPT

## 2021-02-17 PROCEDURE — 80061 LIPID PANEL: CPT

## 2021-02-17 PROCEDURE — 36415 COLL VENOUS BLD VENIPUNCTURE: CPT

## 2021-03-09 ENCOUNTER — OFFICE VISIT (OUTPATIENT)
Dept: INTERNAL MEDICINE CLINIC | Facility: CLINIC | Age: 71
End: 2021-03-09
Payer: COMMERCIAL

## 2021-03-09 VITALS
OXYGEN SATURATION: 95 % | TEMPERATURE: 98.5 F | HEART RATE: 65 BPM | WEIGHT: 181.4 LBS | BODY MASS INDEX: 24.57 KG/M2 | DIASTOLIC BLOOD PRESSURE: 78 MMHG | HEIGHT: 72 IN | SYSTOLIC BLOOD PRESSURE: 122 MMHG

## 2021-03-09 DIAGNOSIS — R73.01 IMPAIRED FASTING GLUCOSE: ICD-10-CM

## 2021-03-09 DIAGNOSIS — E78.2 MIXED HYPERLIPIDEMIA: ICD-10-CM

## 2021-03-09 DIAGNOSIS — R97.20 ELEVATED PSA: ICD-10-CM

## 2021-03-09 DIAGNOSIS — I10 BENIGN ESSENTIAL HYPERTENSION: Primary | ICD-10-CM

## 2021-03-09 PROBLEM — R17 ELEVATED BILIRUBIN: Status: RESOLVED | Noted: 2020-09-01 | Resolved: 2021-03-09

## 2021-03-09 PROCEDURE — 3074F SYST BP LT 130 MM HG: CPT | Performed by: INTERNAL MEDICINE

## 2021-03-09 PROCEDURE — 1036F TOBACCO NON-USER: CPT | Performed by: INTERNAL MEDICINE

## 2021-03-09 PROCEDURE — 1160F RVW MEDS BY RX/DR IN RCRD: CPT | Performed by: INTERNAL MEDICINE

## 2021-03-09 PROCEDURE — 3008F BODY MASS INDEX DOCD: CPT | Performed by: INTERNAL MEDICINE

## 2021-03-09 PROCEDURE — 3288F FALL RISK ASSESSMENT DOCD: CPT | Performed by: INTERNAL MEDICINE

## 2021-03-09 PROCEDURE — 99214 OFFICE O/P EST MOD 30 MIN: CPT | Performed by: INTERNAL MEDICINE

## 2021-03-09 PROCEDURE — 1101F PT FALLS ASSESS-DOCD LE1/YR: CPT | Performed by: INTERNAL MEDICINE

## 2021-03-09 PROCEDURE — 3078F DIAST BP <80 MM HG: CPT | Performed by: INTERNAL MEDICINE

## 2021-03-09 PROCEDURE — 3725F SCREEN DEPRESSION PERFORMED: CPT | Performed by: INTERNAL MEDICINE

## 2021-03-09 NOTE — PROGRESS NOTES
[facility]    NAME: Haevenly Ray  AGE: 72 y o  SEX: male  : 1950     DATE: 3/9/2021     Assessment and Plan:     1  Benign essential hypertension    BP is controlled on current anti-hypertensives  Will monitor     - Basic metabolic panel; Future  - CBC; Future    2  Impaired fasting glucose    Most recent A1c was 5 8 % on 2021  Avoid excess carbohydrates  Will monitor glucose/A1c levels  3  Mixed hyperlipidemia    Cholesterol is controlled  Continue rosuvastatin 5mg     - CBC; Future  - Lipid panel; Future    4  Elevated PSA    PSA is rising and has follow-up with urology to discuss MRI findings and whether to proceed with biopsy  Return in about 6 months (around 2021) for Subsequent AWV  Chief Complaint:     Chief Complaint   Patient presents with    Follow-up      History of Present Illness:     Patient presents to establish care  Former patient of Dr Delvis Parisi  Had labs done which showed A1c of 5 8% which is stable  He has known prediabetes  Admits to increased sweet/carbohydrate intake at times  He is active with his grandson doing yard work and shoveling snow  No recent cardiac symptoms  Takes medications for BP and cholesterol  PSA has been rising  Had a recent MRI via Dr Lg Ansari and has follow-up this week to discuss  Review of Systems:     Review of Systems   Constitutional: Negative for activity change, appetite change and fatigue  Respiratory: Negative for apnea, cough, chest tightness, shortness of breath and wheezing  Cardiovascular: Negative for chest pain, palpitations and leg swelling  Gastrointestinal: Negative for abdominal distention, abdominal pain, blood in stool, constipation, diarrhea, nausea and vomiting  Neurological: Negative for dizziness, weakness, light-headedness, numbness and headaches     Psychiatric/Behavioral: Negative for behavioral problems, confusion, hallucinations, sleep disturbance and suicidal ideas  The patient is not nervous/anxious  Objective:     /78 (BP Location: Left arm, Patient Position: Sitting, Cuff Size: Standard)   Pulse 65   Temp 98 5 °F (36 9 °C) (Temporal) Comment: NO NSAIDS  Ht 5' 11 5" (1 816 m)   Wt 82 3 kg (181 lb 6 4 oz)   SpO2 95%   BMI 24 95 kg/m²     Physical Exam  Vitals signs reviewed  Constitutional:       General: He is not in acute distress  Appearance: He is well-developed  He is not diaphoretic  Neck:      Musculoskeletal: Neck supple  Thyroid: No thyromegaly  Vascular: No JVD  Cardiovascular:      Rate and Rhythm: Normal rate and regular rhythm  Heart sounds: Normal heart sounds  No murmur  Pulmonary:      Effort: Pulmonary effort is normal  No respiratory distress  Breath sounds: Normal breath sounds  No wheezing or rales  Abdominal:      General: Bowel sounds are normal  There is no distension  Palpations: Abdomen is soft  Tenderness: There is no abdominal tenderness  Musculoskeletal:      Right lower leg: No edema  Left lower leg: No edema  Lymphadenopathy:      Cervical: No cervical adenopathy  Neurological:      Mental Status: He is alert     Psychiatric:         Mood and Affect: Mood normal          Behavior: Behavior normal        Akiko Clarke, DO  MEDICAL [id number]

## 2021-03-09 NOTE — PATIENT INSTRUCTIONS
DASH Eating Plan   AMBULATORY CARE:   The DASH (Dietary Approaches to Stop Hypertension) Eating Plan  is designed to help prevent or lower high blood pressure  It can also help to lower LDL (bad) cholesterol and decrease your risk for heart disease  The plan is low in sodium, sugar, unhealthy fats, and total fat  It is high in potassium, calcium, magnesium, and fiber  These nutrients are added when you eat more fruits, vegetables, and whole grains  Your sodium limit each day: Your dietitian will tell you how much sodium is safe for you to have each day  People with high blood pressure should have no more than 1,500 to 2,300 mg of sodium in a day  A teaspoon (tsp) of salt has 2,300 mg of sodium  This may seem like a difficult goal, but small changes to the foods you eat can make a big difference  Your healthcare provider or dietitian can help you create a meal plan that follows your sodium limit  How to limit sodium:   · Read food labels  Food labels can help you choose foods that are low in sodium  The amount of sodium is listed in milligrams (mg)  The % Daily Value (DV) column tells you how much of your daily needs are met by 1 serving of the food for each nutrient listed  Choose foods that have less than 5% of the DV of sodium  These foods are considered low in sodium  Foods that have 20% or more of the DV of sodium are considered high in sodium  Avoid foods that have more than 300 mg of sodium in each serving  Choose foods that say low-sodium, reduced-sodium, or no salt added on the food label  · Avoid salt  Do not salt food at the table, and add very little salt to foods during cooking  Use herbs and spices, such as onions, garlic, and salt-free seasonings to add flavor to foods  Try lemon or lime juice or vinegar to give foods a tart flavor  Use hot peppers or a small amount of hot pepper sauce to add a spicy flavor to foods  · Ask about salt substitutes    Ask your healthcare provider if you may use salt substitutes  Some salt substitutes have ingredients that can be harmful if you have certain health conditions  · Choose foods carefully at restaurants  Meals from restaurants, especially fast food restaurants, are often high in sodium  Some restaurants have nutrition information that tells you the amount of sodium in their foods  Ask to have your food prepared with less, or no salt  What you need to know about fats:   · Include healthy fats  Examples are unsaturated fats and omega-3 fatty acids  Unsaturated fats are found in soybean, canola, olive, or sunflower oil, and liquid and soft tub margarines  Omega-3 fatty acids are found in fatty fish, such as salmon, tuna, mackerel, and sardines  It is also found in flaxseed oil and ground flaxseed  · Avoid unhealthy fats  Do not eat unhealthy fats, such as saturated fats and trans fats  Saturated fats are found in foods that contain fat from animals  Examples are fatty meats, whole milk, butter, cream, and other dairy foods  It is also found in shortening, stick margarine, palm oil, and coconut oil  Trans fats are found in fried foods, crackers, chips, and baked goods made with margarine or shortening  Foods to include: With the DASH eating plan, you need to eat a certain number of servings from each food group  This will help you get enough of certain nutrients and limit others  The amount of servings you should eat depends on how many calories you need  Your dietitian can tell you how many calories you need  The number of servings listed next to the food groups below are for people who need about 2,000 calories each day  · Grains:  6 to 8 servings (3 of these servings should be whole-grain foods)    ? 1 slice of whole-grain bread     ? 1 ounce of dry cereal    ? ½ cup of cooked cereal, pasta, or brown rice    · Vegetables and fruits:  4 to 5 servings of fruits and 4 to 5 servings of vegetables    ? 1 medium fruit    ?  ½ cup of frozen, canned (no added salt), or chopped fresh vegetables     ? ½ cup of fresh, frozen, dried, or canned fruit (canned in light syrup or fruit juice)    ? ½ cup of vegetable or fruit juice    · Dairy:  2 to 3 servings    ? 1 cup of nonfat (skim) or 1% milk    ? 1½ ounces of fat-free or low-fat cheese    ? 6 ounces of nonfat or low-fat yogurt    · Lean meat, poultry, and fish:  6 ounces or less    ? Poultry (chicken, turkey) with no skin    ? Fish (especially fatty fish, such as salmon, fresh tuna, or mackerel)    ? Lean beef and pork (loin, round, extra lean hamburger)    ? Egg whites and egg substitutes    · Nuts, seeds, and legumes:  4 to 5 servings each week    ? ½ cup of cooked beans and peas    ? 1½ ounces of unsalted nuts    ? 2 tablespoons of peanut butter or seeds    · Sweets and added sugars:  5 or less each week    ? 1 tablespoon of sugar, jelly, or jam    ? ½ cup of sorbet or gelatin    ? 1 cup of lemonade    · Fats:  2 to 3 servings each week    ? 1 teaspoon of soft margarine or vegetable oil    ? 1 tablespoon of mayonnaise    ? 2 tablespoons of salad dressing    Foods to avoid:   · Grains:      ? Baked goods, such as doughnuts, pastries, cookies, and biscuits (high in fat and sugar)    ? Mixes for cornbread and biscuits, packaged foods, such as bread stuffing, rice and pasta mixes, macaroni and cheese, and instant cereals (high in sodium)    · Fruits and vegetables:      ? Regular, canned vegetables (high in sodium)    ? Sauerkraut, pickled vegetables, and other foods prepared in brine (high in sodium)    ? Fried vegetables or vegetables in butter or high-fat sauces    ? Fruit in cream or butter sauce (high in fat)    · Dairy:      ? Whole milk, 2% milk, and cream (high in fat)    ? Regular cheese and processed cheese (high in fat and sodium)    · Meats and protein foods:      ? Smoked or cured meat, such as corned beef, byrne, ham, hot dogs, and sausage (high in fat and sodium)    ?  Canned beans and canned meats or spreads, such as potted meats, sardines, anchovies, and imitation seafood (high in sodium)    ? Deli or lunch meats, such as bologna, ham, turkey, and roast beef (high in sodium)    ? High-fat meat (T-bone steak, regular hamburger, and ribs)    ? Whole eggs and egg yolks (high in fat)    · Other:      ? Seasonings made with salt, such as garlic salt, celery salt, onion salt, seasoned salt, meat tenderizers, and monosodium glutamate (MSG)    ? Miso soup and canned or dried soup mixes (high in sodium)    ? Regular soy sauce, barbecue sauce, teriyaki sauce, steak sauce, Worcestershire sauce, and most flavored vinegars (high in sodium)    ? Regular condiments, such as mustard, ketchup, and salad dressings (high in sodium)    ? Gravy and sauces, such as Jovani or cheese sauces (high in sodium and fat)    ? Drinks high in sugar, such as soda or fruit drinks    ? Snack foods, such as salted chips, popcorn, pretzels, pork rinds, salted crackers, and salted nuts    ? Frozen foods, such as dinners, entrees, vegetables with sauces, and breaded meats (high in sodium)    Other guidelines to follow:   · Maintain a healthy weight  Your risk for heart disease is higher if you are overweight  Your healthcare provider may suggest that you lose weight if you are overweight  You can lose weight by eating fewer calories and foods that have added sugars and fat  The DASH meal plan can help you do this  Decrease calories by eating smaller portions at each meal and fewer snacks  Ask your healthcare provider for more information about how to lose weight  · Exercise regularly  Regular exercise can help you reach or maintain a healthy weight  Regular exercise can also help decrease your blood pressure and improve your cholesterol levels  Get 30 minutes or more of moderate exercise each day of the week  To lose weight, get at least 60 minutes of exercise   Talk to your healthcare provider about the best exercise program for you  · Limit alcohol  Women should limit alcohol to 1 drink a day  Men should limit alcohol to 2 drinks a day  A drink of alcohol is 12 ounces of beer, 5 ounces of wine, or 1½ ounces of liquor  © Copyright [address] Information is for End User's use only and may not be sold, redistributed or otherwise used for commercial purposes  All illustrations and images included in CareNotes® are the copyrighted property of [employer] M [employer]  or [address] [place], [place] [address]   The above information is an  only  It is not intended as medical advice for individual conditions or treatments  Talk to your doctor, nurse or pharmacist before following any medical regimen to see if it is safe and effective for you

## 2021-03-10 DIAGNOSIS — Z23 ENCOUNTER FOR IMMUNIZATION: ICD-10-CM

## 2021-05-25 DIAGNOSIS — I10 BENIGN ESSENTIAL HYPERTENSION: ICD-10-CM

## 2021-05-25 RX ORDER — LOSARTAN POTASSIUM AND HYDROCHLOROTHIAZIDE 12.5; 5 MG/1; MG/1
1 TABLET ORAL DAILY
Qty: 90 TABLET | Refills: 1 | Status: SHIPPED | OUTPATIENT
Start: 2021-05-25 | End: 2021-09-15 | Stop reason: SDUPTHER

## 2021-09-09 ENCOUNTER — APPOINTMENT (OUTPATIENT)
Dept: LAB | Facility: CLINIC | Age: 71
End: 2021-09-09
Payer: COMMERCIAL

## 2021-09-09 DIAGNOSIS — E78.2 MIXED HYPERLIPIDEMIA: ICD-10-CM

## 2021-09-09 DIAGNOSIS — N40.1 BENIGN PROSTATIC HYPERPLASIA WITH LOWER URINARY TRACT SYMPTOMS, SYMPTOM DETAILS UNSPECIFIED: ICD-10-CM

## 2021-09-09 DIAGNOSIS — I10 BENIGN ESSENTIAL HYPERTENSION: ICD-10-CM

## 2021-09-09 LAB
ANION GAP SERPL CALCULATED.3IONS-SCNC: 0 MMOL/L (ref 4–13)
BUN SERPL-MCNC: 20 MG/DL (ref 5–25)
CALCIUM SERPL-MCNC: 8.9 MG/DL (ref 8.3–10.1)
CHLORIDE SERPL-SCNC: 105 MMOL/L (ref 100–108)
CHOLEST SERPL-MCNC: 136 MG/DL (ref 50–200)
CO2 SERPL-SCNC: 31 MMOL/L (ref 21–32)
CREAT SERPL-MCNC: 1.21 MG/DL (ref 0.6–1.3)
ERYTHROCYTE [DISTWIDTH] IN BLOOD BY AUTOMATED COUNT: 12.9 % (ref 11.6–15.1)
GFR SERPL CREATININE-BSD FRML MDRD: 61 ML/MIN/1.73SQ M
GLUCOSE P FAST SERPL-MCNC: 102 MG/DL (ref 65–99)
HCT VFR BLD AUTO: 48.8 % (ref 36.5–49.3)
HDLC SERPL-MCNC: 47 MG/DL
HGB BLD-MCNC: 16.4 G/DL (ref 12–17)
LDLC SERPL CALC-MCNC: 74 MG/DL (ref 0–100)
MCH RBC QN AUTO: 30.4 PG (ref 26.8–34.3)
MCHC RBC AUTO-ENTMCNC: 33.6 G/DL (ref 31.4–37.4)
MCV RBC AUTO: 90 FL (ref 82–98)
NONHDLC SERPL-MCNC: 89 MG/DL
PLATELET # BLD AUTO: 235 THOUSANDS/UL (ref 149–390)
PMV BLD AUTO: 9.1 FL (ref 8.9–12.7)
POTASSIUM SERPL-SCNC: 4.2 MMOL/L (ref 3.5–5.3)
PSA SERPL-MCNC: 6.6 NG/ML (ref 0–4)
RBC # BLD AUTO: 5.4 MILLION/UL (ref 3.88–5.62)
SODIUM SERPL-SCNC: 136 MMOL/L (ref 136–145)
TRIGL SERPL-MCNC: 75 MG/DL
WBC # BLD AUTO: 4.97 THOUSAND/UL (ref 4.31–10.16)

## 2021-09-09 PROCEDURE — 80048 BASIC METABOLIC PNL TOTAL CA: CPT

## 2021-09-09 PROCEDURE — 36415 COLL VENOUS BLD VENIPUNCTURE: CPT

## 2021-09-09 PROCEDURE — 84153 ASSAY OF PSA TOTAL: CPT

## 2021-09-09 PROCEDURE — 85027 COMPLETE CBC AUTOMATED: CPT

## 2021-09-09 PROCEDURE — 80061 LIPID PANEL: CPT

## 2021-09-14 DIAGNOSIS — E78.5 BORDERLINE HYPERLIPIDEMIA: ICD-10-CM

## 2021-09-14 RX ORDER — ROSUVASTATIN CALCIUM 5 MG/1
TABLET, COATED ORAL
Qty: 90 TABLET | Refills: 1 | Status: SHIPPED | OUTPATIENT
Start: 2021-09-14 | End: 2021-12-20 | Stop reason: SDUPTHER

## 2021-09-15 ENCOUNTER — OFFICE VISIT (OUTPATIENT)
Dept: INTERNAL MEDICINE CLINIC | Facility: CLINIC | Age: 71
End: 2021-09-15
Payer: COMMERCIAL

## 2021-09-15 VITALS
OXYGEN SATURATION: 96 % | WEIGHT: 172.2 LBS | TEMPERATURE: 97.6 F | HEIGHT: 72 IN | SYSTOLIC BLOOD PRESSURE: 136 MMHG | DIASTOLIC BLOOD PRESSURE: 82 MMHG | HEART RATE: 64 BPM | BODY MASS INDEX: 23.32 KG/M2

## 2021-09-15 DIAGNOSIS — I10 BENIGN ESSENTIAL HYPERTENSION: Primary | ICD-10-CM

## 2021-09-15 DIAGNOSIS — Z00.00 MEDICARE ANNUAL WELLNESS VISIT, SUBSEQUENT: ICD-10-CM

## 2021-09-15 DIAGNOSIS — E78.2 MIXED HYPERLIPIDEMIA: ICD-10-CM

## 2021-09-15 DIAGNOSIS — R73.01 IMPAIRED FASTING GLUCOSE: ICD-10-CM

## 2021-09-15 PROCEDURE — 1125F AMNT PAIN NOTED PAIN PRSNT: CPT | Performed by: INTERNAL MEDICINE

## 2021-09-15 PROCEDURE — 3725F SCREEN DEPRESSION PERFORMED: CPT | Performed by: INTERNAL MEDICINE

## 2021-09-15 PROCEDURE — G0439 PPPS, SUBSEQ VISIT: HCPCS | Performed by: INTERNAL MEDICINE

## 2021-09-15 PROCEDURE — 99214 OFFICE O/P EST MOD 30 MIN: CPT | Performed by: INTERNAL MEDICINE

## 2021-09-15 PROCEDURE — 3288F FALL RISK ASSESSMENT DOCD: CPT | Performed by: INTERNAL MEDICINE

## 2021-09-15 PROCEDURE — 1160F RVW MEDS BY RX/DR IN RCRD: CPT | Performed by: INTERNAL MEDICINE

## 2021-09-15 PROCEDURE — 1170F FXNL STATUS ASSESSED: CPT | Performed by: INTERNAL MEDICINE

## 2021-09-15 PROCEDURE — 1036F TOBACCO NON-USER: CPT | Performed by: INTERNAL MEDICINE

## 2021-09-15 PROCEDURE — 3008F BODY MASS INDEX DOCD: CPT | Performed by: INTERNAL MEDICINE

## 2021-09-15 PROCEDURE — 3079F DIAST BP 80-89 MM HG: CPT | Performed by: INTERNAL MEDICINE

## 2021-09-15 PROCEDURE — 3075F SYST BP GE 130 - 139MM HG: CPT | Performed by: INTERNAL MEDICINE

## 2021-09-15 RX ORDER — LOSARTAN POTASSIUM AND HYDROCHLOROTHIAZIDE 12.5; 5 MG/1; MG/1
1 TABLET ORAL DAILY
Qty: 90 TABLET | Refills: 3 | Status: SHIPPED | OUTPATIENT
Start: 2021-09-15 | End: 2021-11-26

## 2021-09-15 NOTE — PROGRESS NOTES
[facility]    NAME: Fletcher Ray  AGE: 72 y o  SEX: male  : 1950     DATE: 9/15/2021     Assessment and Plan:     1  Benign essential hypertension    Stable  Continue anti-HTN medications  Watch BP periodically at home  - losartan-hydrochlorothiazide (HYZAAR) 50-12 5 mg per tablet; Take 1 tablet by mouth daily  Dispense: 90 tablet; Refill: 3  - Basic metabolic panel; Future    2  Mixed hyperlipidemia    Lab Results   Component Value Date    LDLCALC 74 2021      Continue statin  Cholesterol controlled  - Lipid panel; Future    3  Impaired fasting glucose    Most recent A1c was 5 8 % on 2021  Watch sugar intake  Repeat A1c before next visit  - Hemoglobin A1C; Future        Return in about 6 months (around 3/22/2022) for Follow-up  Chief Complaint:     Chief Complaint   Patient presents with    Medicare Wellness Visit    Follow-up     routine        History of Present Illness:     Pavel Hernandez presents for follow-up  He is feeling well  Lab work is stable with improvement in fasting glucose  Known history of prediabetes  Most recent A1c was 5 8 % on 2021  Review of Systems:     Review of Systems   Constitutional: Negative for activity change, appetite change and fatigue  Respiratory: Negative for apnea, cough, chest tightness, shortness of breath and wheezing  Cardiovascular: Negative for chest pain, palpitations and leg swelling  Gastrointestinal: Negative for abdominal distention, abdominal pain, blood in stool, constipation, diarrhea, nausea and vomiting  Musculoskeletal: Negative for arthralgias, back pain, gait problem, joint swelling and myalgias  Skin: Negative for rash and wound  Neurological: Negative for dizziness, weakness, light-headedness, numbness and headaches  Psychiatric/Behavioral: Negative for behavioral problems, confusion, hallucinations, sleep disturbance and suicidal ideas   The patient is not nervous/anxious  Objective:     /82   Pulse 64   Temp 97 6 °F (36 4 °C) (Tympanic)   Ht 5' 11 5" (1 816 m)   Wt 78 1 kg (172 lb 3 2 oz)   SpO2 96%   BMI 23 68 kg/m²     Physical Exam  Vitals reviewed  Constitutional:       General: He is not in acute distress  Appearance: He is well-developed  He is not diaphoretic  Eyes:      General: No scleral icterus  Right eye: No discharge  Left eye: No discharge  Conjunctiva/sclera: Conjunctivae normal    Neck:      Thyroid: No thyromegaly  Vascular: No JVD  Cardiovascular:      Rate and Rhythm: Normal rate and regular rhythm  Heart sounds: Normal heart sounds  No murmur heard  Pulmonary:      Effort: Pulmonary effort is normal  No respiratory distress  Breath sounds: Normal breath sounds  No wheezing or rales  Abdominal:      General: Bowel sounds are normal  There is no distension  Palpations: Abdomen is soft  Tenderness: There is no abdominal tenderness  Musculoskeletal:      Cervical back: Neck supple  Right lower leg: No edema  Left lower leg: No edema  Lymphadenopathy:      Cervical: No cervical adenopathy  Neurological:      Mental Status: He is alert         Patrick Baker DO  [facility]

## 2021-09-15 NOTE — PATIENT INSTRUCTIONS
Medicare Preventive Visit Patient Instructions  Thank you for completing your Welcome to Medicare Visit or Medicare Annual Wellness Visit today  Your next wellness visit will be due in one year (9/16/2022)  The screening/preventive services that you may require over the next 5-10 years are detailed below  Some tests may not apply to you based off risk factors and/or age  Screening tests ordered at today's visit but not completed yet may show as past due  Also, please note that scanned in results may not display below  Preventive Screenings:  Service Recommendations Previous Testing/Comments   Colorectal Cancer Screening  · Colonoscopy    · Fecal Occult Blood Test (FOBT)/Fecal Immunochemical Test (FIT)  · Fecal DNA/Cologuard Test  · Flexible Sigmoidoscopy Age: 54-65 years old   Colonoscopy: every 10 years (May be performed more frequently if at higher risk)  OR  FOBT/FIT: every 1 year  OR  Cologuard: every 3 years  OR  Sigmoidoscopy: every 5 years  Screening may be recommended earlier than age 48 if at higher risk for colorectal cancer  Also, an individualized decision between you and your healthcare provider will decide whether screening between the ages of 74-80 would be appropriate   Colonoscopy: 10/26/2020  FOBT/FIT: Not on file  Cologuard: Not on file  Sigmoidoscopy: Not on file    Screening Current     Prostate Cancer Screening Individualized decision between patient and health care provider in men between ages of 53-78   Medicare will cover every 12 months beginning on the day after your 50th birthday PSA: 6 6 ng/mL     Screening Current     Hepatitis C Screening Once for adults born between 1945 and 1965  More frequently in patients at high risk for Hepatitis C Hep C Antibody: 09/01/2020    Screening Current   Diabetes Screening 1-2 times per year if you're at risk for diabetes or have pre-diabetes Fasting glucose: 102 mg/dL   A1C: 5 8 %    Screening Current   Cholesterol Screening Once every 5 years if you don't have a lipid disorder  May order more often based on risk factors  Lipid panel: 09/09/2021    Screening Not Indicated  History Lipid Disorder      Other Preventive Screenings Covered by Medicare:  1  Abdominal Aortic Aneurysm (AAA) Screening: covered once if your at risk  You're considered to be at risk if you have a family history of AAA or a male between the age of 73-68 who smoking at least 100 cigarettes in your lifetime  2  Lung Cancer Screening: covers low dose CT scan once per year if you meet all of the following conditions: (1) Age 50-69; (2) No signs or symptoms of lung cancer; (3) Current smoker or have quit smoking within the last 15 years; (4) You have a tobacco smoking history of at least 30 pack years (packs per day x number of years you smoked); (5) You get a written order from a healthcare provider  3  Glaucoma Screening: covered annually if you're considered high risk: (1) You have diabetes OR (2) Family history of glaucoma OR (3)  aged 48 and older OR (3)  American aged 72 and older  3  Osteoporosis Screening: covered every 2 years if you meet one of the following conditions: (1) Have a vertebral abnormality; (2) On glucocorticoid therapy for more than 3 months; (3) Have primary hyperparathyroidism; (4) On osteoporosis medications and need to assess response to drug therapy  5  HIV Screening: covered annually if you're between the age of 12-76  Also covered annually if you are younger than 13 and older than 72 with risk factors for HIV infection  For pregnant patients, it is covered up to 3 times per pregnancy      Immunizations:  Immunization Recommendations   Influenza Vaccine Annual influenza vaccination during flu season is recommended for all persons aged >= 6 months who do not have contraindications   Pneumococcal Vaccine (Prevnar and Pneumovax)  * Prevnar = PCV13  * Pneumovax = PPSV23 Adults 25-60 years old: 1-3 doses may be recommended based on certain risk factors  Adults 72 years old: Prevnar (PCV13) vaccine recommended followed by Pneumovax (PPSV23) vaccine  If already received PPSV23 since turning 65, then PCV13 recommended at least one year after PPSV23 dose  Hepatitis B Vaccine 3 dose series if at intermediate or high risk (ex: diabetes, end stage renal disease, liver disease)   Tetanus (Td) Vaccine - COST NOT COVERED BY MEDICARE PART B Following completion of primary series, a booster dose should be given every 10 years to maintain immunity against tetanus  Td may also be given as tetanus wound prophylaxis  Tdap Vaccine - COST NOT COVERED BY MEDICARE PART B Recommended at least once for all adults  For pregnant patients, recommended with each pregnancy  Shingles Vaccine (Shingrix) - COST NOT COVERED BY MEDICARE PART B  2 shot series recommended in those aged 48 and above     Health Maintenance Due:      Topic Date Due    Colorectal Cancer Screening  10/26/2023    Hepatitis C Screening  Completed     Immunizations Due:      Topic Date Due    DTaP,Tdap,and Td Vaccines (1 - Tdap) 11/02/1972    Influenza Vaccine (1) 09/01/2021     Advance Directives   What are advance directives? Advance directives are legal documents that state your wishes and plans for medical care  These plans are made ahead of time in case you lose your ability to make decisions for yourself  Advance directives can apply to any medical decision, such as the treatments you want, and if you want to donate organs  What are the types of advance directives? There are many types of advance directives, and each state has rules about how to use them  You may choose a combination of any of the following:  · Living will: This is a written record of the treatment you want  You can also choose which treatments you do not want, which to limit, and which to stop at a certain time  This includes surgery, medicine, IV fluid, and tube feedings     · Durable power of  for healthcare [facility]): This is a written record that states who you want to make healthcare choices for you when you are unable to make them for yourself  This person, called a proxy, is usually a family member or a friend  You may choose more than 1 proxy  · Do not resuscitate (DNR) order:  A DNR order is used in case your heart stops beating or you stop breathing  It is a request not to have certain forms of treatment, such as CPR  A DNR order may be included in other types of advance directives  · Medical directive: This covers the care that you want if you are in a coma, near death, or unable to make decisions for yourself  You can list the treatments you want for each condition  Treatment may include pain medicine, surgery, blood transfusions, dialysis, IV or tube feedings, and a ventilator (breathing machine)  · Values history: This document has questions about your views, beliefs, and how you feel and think about life  This information can help others choose the care that you would choose  Why are advance directives important? An advance directive helps you control your care  Although spoken wishes may be used, it is better to have your wishes written down  Spoken wishes can be misunderstood, or not followed  Treatments may be given even if you do not want them  An advance directive may make it easier for your family to make difficult choices about your care  © Copyright [address] 2018 Information is for End User's use only and may not be sold, redistributed or otherwise used for commercial purposes   All illustrations and images included in CareNotes® are the copyrighted property of [employer]  or [address] [place] [address]

## 2021-09-15 NOTE — PROGRESS NOTES
Assessment and Plan:     1  Medicare annual wellness visit, subsequent    Depression Screening and Follow-up Plan:   Patient was screened for depression during today's encounter  They screened negative with a PHQ-2 score of 0  Preventive health issues were discussed with patient, and age appropriate screening tests were ordered as noted in patient's After Visit Summary  Personalized health advice and appropriate referrals for health education or preventive services given if needed, as noted in patient's After Visit Summary       History of Present Illness:     Patient presents for Medicare Annual Wellness visit    Patient Care Team:  Jose A Beatty DO as PCP - General (Internal Medicine)  Ralf Vegas MD - Urology     Problem List:     Patient Active Problem List   Diagnosis    Benign essential hypertension    Mixed hyperlipidemia    Hyperglycemia    History of colon polyps    Elevated PSA      Past Medical and Surgical History:     Past Medical History:   Diagnosis Date    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    Hemorrhoids     Inflamed seborrheic keratosis     Multiple benign polyps of large intestine     History of personal colonic polyps    Neoplasm of uncertain behavior of skin     Sebaceous cyst      Past Surgical History:   Procedure Laterality Date    HAND SURGERY Left     thumb ligament repair    INGUINAL HERNIA REPAIR      KNEE ARTHROSCOPY Left     TONSILLECTOMY        Family History:     Family History   Problem Relation Age of Onset    Stroke Mother         stroke syndrome    Other Father         hematologic disorder      Social History:     Socioeconomic History    Marital status: /Civil Union     Spouse name: Not on file    Number of children: Not on file    Years of education: Not on file    Highest education level: Not on file   Occupational History    Not on file   Tobacco Use    Smoking status: Former Smoker     Packs/day: 0 50 Years: 5 00     Pack years: 2 50     Types: Cigarettes     Quit date: 1     Years since quittin 7    Smokeless tobacco: Never Used   Vaping Use    Vaping Use: Never used   Substance and Sexual Activity    Alcohol use: Not Currently     Alcohol/week: 0 0 standard drinks    Drug use: No    Sexual activity: Yes     Partners: Female     Birth control/protection: Male Sterilization   Other Topics Concern    Not on file   Social History Narrative    Active advance directive      Medications and Allergies:     Current Outpatient Medications   Medication Sig Dispense Refill    losartan-hydrochlorothiazide (HYZAAR) 50-12 5 mg per tablet Take 1 tablet by mouth daily 90 tablet 1    rosuvastatin (CRESTOR) 5 mg tablet TAKE 1 TABLET BY MOUTH EVERY DAY 90 tablet 1     No current facility-administered medications for this visit  No Known Allergies      Immunizations:     Immunization History   Administered Date(s) Administered    INFLUENZA 10/14/2013    Influenza Split High Dose Preservative Free IM 10/07/2017, 10/19/2019    Influenza, high dose seasonal 0 7 mL 2020    Influenza, seasonal, injectable 10/01/2015, 10/01/2016    Pneumococcal Conjugate 13-Valent 2018    Pneumococcal Polysaccharide PPV23 2018    SARS-CoV-2 / COVID-19 mRNA IM (Moderna) 2021, 2021    Tdap 1950    Zoster 2014    Zoster Vaccine Recombinant 10/24/2019, 2020      Health Maintenance:         Topic Date Due    Colorectal Cancer Screening  10/26/2023    Hepatitis C Screening  Completed         Topic Date Due    DTaP,Tdap,and Td Vaccines (1 - Tdap) 1972    Influenza Vaccine (1) 2021      Medicare Health Risk Assessment:     /82   Pulse 64   Temp 97 6 °F (36 4 °C) (Tympanic)   Ht 5' 11 5" (1 816 m)   Wt 78 1 kg (172 lb 3 2 oz)   SpO2 96%   BMI 23 68 kg/m²      Anna Barajas is here for his Subsequent Wellness visit   Last Medicare Wellness visit information reviewed, patient interviewed and updates made to the record today  Health Risk Assessment:   Patient rates overall health as very good  Patient feels that their physical health rating is same  Patient is very satisfied with their life  Eyesight was rated as same  Hearing was rated as same  Patient feels that their emotional and mental health rating is same  Patients states they are never, rarely angry  Patient states they are never, rarely unusually tired/fatigued  Pain experienced in the last 7 days has been none  Patient states that he has experienced no weight loss or gain in last 6 months  Depression Screening:   PHQ-2 Score: 0      Fall Risk Screening: In the past year, patient has experienced: no history of falling in past year      Home Safety:  Patient does not have trouble with stairs inside or outside of their home  Patient has working smoke alarms and has working carbon monoxide detector  Home safety hazards include: none  Nutrition:   Current diet is Regular, Low Cholesterol and No Added Salt  Medications:   Patient is not currently taking any over-the-counter supplements  Patient is able to manage medications  Activities of Daily Living (ADLs)/Instrumental Activities of Daily Living (IADLs):   Walk and transfer into and out of bed and chair?: Yes  Dress and groom yourself?: Yes    Bathe or shower yourself?: Yes    Feed yourself? Yes  Do your laundry/housekeeping?: Yes  Manage your money, pay your bills and track your expenses?: Yes  Make your own meals?: Yes    Do your own shopping?: Yes    Durable Medical Equipment Suppliers  none    Previous Hospitalizations:   Any hospitalizations or ED visits within the last 12 months?: No      Advance Care Planning:   Living will: Yes    Durable POA for healthcare:  Yes    Advanced directive: Yes    Five wishes given: No      Cognitive Screening:   Provider or family/friend/caregiver concerned regarding cognition?: No    PREVENTIVE SCREENINGS Cardiovascular Screening:    General: Screening Not Indicated and History Lipid Disorder      Diabetes Screening:     General: Screening Current      Colorectal Cancer Screening:     General: Screening Current      Prostate Cancer Screening:    General: Screening Current      Osteoporosis Screening:    General: Screening Not Indicated      Abdominal Aortic Aneurysm (AAA) Screening:    Risk factors include: age between 73-67 yo and tobacco use        General: Screening Current      Lung Cancer Screening:     General: Screening Not Indicated      Hepatitis C Screening:    General: Screening Current    Screening, Brief Intervention, and Referral to Treatment (SBIRT)    Screening  Typical number of drinks in a day: 0  Typical number of drinks in a week: 1  Interpretation: Low risk drinking behavior  AUDIT-C Screenin) How often did you have a drink containing alcohol in the past year? 2 to 4 times a month  2) How many drinks did you have on a typical day when you were drinking in the past year? 1 to 2  3) How often did you have 6 or more drinks on one occasion in the past year? never    AUDIT-C Score: 2  Interpretation: Score 0-3 (male): Negative screen for alcohol misuse    Single Item Drug Screening:  How often have you used an illegal drug (including marijuana) or a prescription medication for non-medical reasons in the past year? never    Single Item Drug Screen Score: 0  Interpretation: Negative screen for possible drug use disorder    Brief Intervention  Alcohol & drug use screenings were reviewed  No concerns regarding substance use disorder identified  Other Counseling Topics:   Car/seat belt/driving safety, skin self-exam, sunscreen and regular weightbearing exercise         Swiss Panning, DO

## 2021-11-26 DIAGNOSIS — I10 BENIGN ESSENTIAL HYPERTENSION: ICD-10-CM

## 2021-11-26 RX ORDER — HYDROCHLOROTHIAZIDE 12.5 MG/1
12.5 TABLET ORAL DAILY
Qty: 90 TABLET | Refills: 1 | Status: SHIPPED | OUTPATIENT
Start: 2021-11-26 | End: 2021-11-29 | Stop reason: SDUPTHER

## 2021-11-26 RX ORDER — LOSARTAN POTASSIUM AND HYDROCHLOROTHIAZIDE 12.5; 5 MG/1; MG/1
1 TABLET ORAL DAILY
Qty: 90 TABLET | Refills: 2 | Status: SHIPPED | OUTPATIENT
Start: 2021-11-26 | End: 2021-11-26 | Stop reason: RX

## 2021-11-26 RX ORDER — LOSARTAN POTASSIUM 50 MG/1
50 TABLET ORAL DAILY
Qty: 90 TABLET | Refills: 1 | Status: SHIPPED | OUTPATIENT
Start: 2021-11-26

## 2021-11-26 RX ORDER — LOSARTAN POTASSIUM AND HYDROCHLOROTHIAZIDE 12.5; 5 MG/1; MG/1
TABLET ORAL
Qty: 90 TABLET | Refills: 1 | Status: SHIPPED | OUTPATIENT
Start: 2021-11-26 | End: 2021-11-26

## 2021-11-30 ENCOUNTER — VBI (OUTPATIENT)
Dept: ADMINISTRATIVE | Facility: OTHER | Age: 71
End: 2021-11-30

## 2021-12-15 ENCOUNTER — TELEPHONE (OUTPATIENT)
Dept: INTERNAL MEDICINE CLINIC | Facility: CLINIC | Age: 71
End: 2021-12-15

## 2021-12-15 ENCOUNTER — IMMUNIZATIONS (OUTPATIENT)
Dept: FAMILY MEDICINE CLINIC | Facility: HOSPITAL | Age: 71
End: 2021-12-15

## 2021-12-15 DIAGNOSIS — Z23 ENCOUNTER FOR IMMUNIZATION: Primary | ICD-10-CM

## 2021-12-15 PROCEDURE — 0064A COVID-19 MODERNA VACC 0.25 ML BOOSTER: CPT

## 2021-12-15 PROCEDURE — 91306 COVID-19 MODERNA VACC 0.25 ML BOOSTER: CPT

## 2021-12-20 DIAGNOSIS — E78.5 BORDERLINE HYPERLIPIDEMIA: ICD-10-CM

## 2021-12-20 RX ORDER — ROSUVASTATIN CALCIUM 5 MG/1
5 TABLET, COATED ORAL DAILY
Qty: 90 TABLET | Refills: 0 | Status: SHIPPED | OUTPATIENT
Start: 2021-12-20 | End: 2022-03-22 | Stop reason: SDUPTHER

## 2022-03-16 ENCOUNTER — APPOINTMENT (OUTPATIENT)
Dept: LAB | Facility: CLINIC | Age: 72
End: 2022-03-16
Payer: COMMERCIAL

## 2022-03-16 DIAGNOSIS — E78.2 MIXED HYPERLIPIDEMIA: ICD-10-CM

## 2022-03-16 DIAGNOSIS — I10 BENIGN ESSENTIAL HYPERTENSION: ICD-10-CM

## 2022-03-16 DIAGNOSIS — R73.01 IMPAIRED FASTING GLUCOSE: ICD-10-CM

## 2022-03-16 LAB
ANION GAP SERPL CALCULATED.3IONS-SCNC: 4 MMOL/L (ref 4–13)
BUN SERPL-MCNC: 21 MG/DL (ref 5–25)
CALCIUM SERPL-MCNC: 9.2 MG/DL (ref 8.3–10.1)
CHLORIDE SERPL-SCNC: 106 MMOL/L (ref 100–108)
CHOLEST SERPL-MCNC: 147 MG/DL
CO2 SERPL-SCNC: 31 MMOL/L (ref 21–32)
CREAT SERPL-MCNC: 1.19 MG/DL (ref 0.6–1.3)
EST. AVERAGE GLUCOSE BLD GHB EST-MCNC: 120 MG/DL
GFR SERPL CREATININE-BSD FRML MDRD: 61 ML/MIN/1.73SQ M
GLUCOSE P FAST SERPL-MCNC: 103 MG/DL (ref 65–99)
HBA1C MFR BLD: 5.8 %
HDLC SERPL-MCNC: 45 MG/DL
LDLC SERPL CALC-MCNC: 82 MG/DL (ref 0–100)
NONHDLC SERPL-MCNC: 102 MG/DL
POTASSIUM SERPL-SCNC: 4 MMOL/L (ref 3.5–5.3)
SODIUM SERPL-SCNC: 141 MMOL/L (ref 136–145)
TRIGL SERPL-MCNC: 101 MG/DL

## 2022-03-16 PROCEDURE — 83036 HEMOGLOBIN GLYCOSYLATED A1C: CPT

## 2022-03-16 PROCEDURE — 80061 LIPID PANEL: CPT

## 2022-03-16 PROCEDURE — 80048 BASIC METABOLIC PNL TOTAL CA: CPT

## 2022-03-16 PROCEDURE — 36415 COLL VENOUS BLD VENIPUNCTURE: CPT

## 2022-03-22 ENCOUNTER — OFFICE VISIT (OUTPATIENT)
Dept: INTERNAL MEDICINE CLINIC | Facility: CLINIC | Age: 72
End: 2022-03-22
Payer: COMMERCIAL

## 2022-03-22 VITALS
BODY MASS INDEX: 25.17 KG/M2 | DIASTOLIC BLOOD PRESSURE: 84 MMHG | SYSTOLIC BLOOD PRESSURE: 138 MMHG | HEIGHT: 72 IN | TEMPERATURE: 97.4 F | HEART RATE: 63 BPM | WEIGHT: 185.8 LBS | OXYGEN SATURATION: 98 %

## 2022-03-22 DIAGNOSIS — R73.01 IMPAIRED FASTING GLUCOSE: ICD-10-CM

## 2022-03-22 DIAGNOSIS — E78.2 MIXED HYPERLIPIDEMIA: ICD-10-CM

## 2022-03-22 DIAGNOSIS — Z12.5 SCREENING FOR PROSTATE CANCER: ICD-10-CM

## 2022-03-22 DIAGNOSIS — I10 BENIGN ESSENTIAL HYPERTENSION: Primary | ICD-10-CM

## 2022-03-22 PROCEDURE — 3075F SYST BP GE 130 - 139MM HG: CPT | Performed by: INTERNAL MEDICINE

## 2022-03-22 PROCEDURE — 1036F TOBACCO NON-USER: CPT | Performed by: INTERNAL MEDICINE

## 2022-03-22 PROCEDURE — 3725F SCREEN DEPRESSION PERFORMED: CPT | Performed by: INTERNAL MEDICINE

## 2022-03-22 PROCEDURE — 3008F BODY MASS INDEX DOCD: CPT | Performed by: INTERNAL MEDICINE

## 2022-03-22 PROCEDURE — 1160F RVW MEDS BY RX/DR IN RCRD: CPT | Performed by: INTERNAL MEDICINE

## 2022-03-22 PROCEDURE — 3079F DIAST BP 80-89 MM HG: CPT | Performed by: INTERNAL MEDICINE

## 2022-03-22 PROCEDURE — 99214 OFFICE O/P EST MOD 30 MIN: CPT | Performed by: INTERNAL MEDICINE

## 2022-03-22 RX ORDER — ROSUVASTATIN CALCIUM 5 MG/1
5 TABLET, COATED ORAL DAILY
Qty: 90 TABLET | Refills: 3 | Status: SHIPPED | OUTPATIENT
Start: 2022-03-22

## 2022-03-22 RX ORDER — LOSARTAN POTASSIUM AND HYDROCHLOROTHIAZIDE 12.5; 5 MG/1; MG/1
1 TABLET ORAL DAILY
Qty: 90 TABLET | Refills: 3 | Status: SHIPPED | OUTPATIENT
Start: 2022-03-22 | End: 2022-05-25 | Stop reason: SDUPTHER

## 2022-03-22 NOTE — PROGRESS NOTES
[facility]    NAME: Mike Ortiz  AGE: 80 y o  SEX: male  : 1950     DATE: 3/22/2022     Assessment and Plan:     1  Benign essential hypertension  Assessment & Plan:  Blood pressure well controlled  Continue losartan/hctz  Orders:  -     losartan-hydrochlorothiazide (HYZAAR) 50-12 5 mg per tablet; Take 1 tablet by mouth daily  -     Comprehensive metabolic panel; Future; Expected date: 09/10/2022  -     CBC; Future; Expected date: 09/10/2022  -     Lipid panel; Future; Expected date: 09/10/2022    2  Impaired fasting glucose  Assessment & Plan:  Most recent A1c was 5 8 % on 3/16/2022  A1c remains stable  Carbohydrate moderation  Monitor  Orders:  -     Comprehensive metabolic panel; Future; Expected date: 09/10/2022  -     CBC; Future; Expected date: 09/10/2022  -     Lipid panel; Future; Expected date: 09/10/2022    3  Mixed hyperlipidemia  Assessment & Plan:  Continue statin  Cholesterol well controlled  Orders:  -     rosuvastatin (CRESTOR) 5 mg tablet; Take 1 tablet (5 mg total) by mouth daily  -     Comprehensive metabolic panel; Future; Expected date: 09/10/2022  -     CBC; Future; Expected date: 09/10/2022  -     Lipid panel; Future; Expected date: 09/10/2022    4  Screening for prostate cancer  -     PSA, Total Screen; Future; Expected date: 09/10/2022      BMI Counseling: Body mass index is 25 55 kg/m²  The BMI is above normal  Nutrition recommendations include moderation in carbohydrate intake and increasing intake of lean protein  Exercise recommendations include exercising 3-5 times per week  Rationale for BMI follow-up plan is due to patient being overweight or obese  Depression Screening and Follow-up Plan: Patient was screened for depression during today's encounter  They screened negative with a PHQ-2 score of 0  Return in about 6 months (around 2022) for Subsequent AWV       Chief Complaint:     Chief Complaint   Patient presents with    Follow-up     6 month      History of Present Illness:       Patient presents for routine follow-up and to go over lab work  Lab work remained stable  Note changes in his health  Denies any cardiac symptoms  Review of Systems:     Review of Systems   Constitutional: Negative for activity change, appetite change and fatigue  Respiratory: Negative for apnea, cough, chest tightness, shortness of breath and wheezing  Cardiovascular: Negative for chest pain, palpitations and leg swelling  Gastrointestinal: Negative for abdominal distention, abdominal pain, blood in stool, constipation, diarrhea, nausea and vomiting  Neurological: Negative for dizziness, weakness, light-headedness, numbness and headaches  Psychiatric/Behavioral: Negative for behavioral problems, confusion, hallucinations, sleep disturbance and suicidal ideas  The patient is not nervous/anxious  Objective:     /84   Pulse 63   Temp (!) 97 4 °F (36 3 °C) (Tympanic)   Ht 5' 11 5" (1 816 m)   Wt 84 3 kg (185 lb 12 8 oz)   SpO2 98%   BMI 25 55 kg/m²     Physical Exam  Constitutional:       General: He is not in acute distress  Appearance: He is not ill-appearing  Cardiovascular:      Rate and Rhythm: Normal rate and regular rhythm  Heart sounds: No murmur heard  Pulmonary:      Effort: Pulmonary effort is normal  No respiratory distress  Breath sounds: No wheezing  Abdominal:      General: Bowel sounds are normal  There is no distension  Tenderness: There is no abdominal tenderness  Musculoskeletal:      Right lower leg: No edema  Left lower leg: No edema  Neurological:      Mental Status: He is alert         Buzz Cools, DO  [facility] [address] [place] [address]

## 2022-03-22 NOTE — PATIENT INSTRUCTIONS
Chronic Hypertension   AMBULATORY CARE:   Hypertension  is high blood pressure  Your blood pressure is the force of your blood moving against the walls of your arteries  Hypertension causes your blood pressure to get so high that your heart has to work much harder than normal  This can damage your heart  Even if you have hypertension for years, lifestyle changes, medicines, or both can help bring your blood pressure to normal   Call your local emergency number (911 in the [address] [facility]) or have someone call if:   · You have chest pain  · You have any of the following signs of a heart attack:      ? Squeezing, pressure, or pain in your chest    ? You may  also have any of the following:     § Discomfort or pain in your back, neck, jaw, stomach, or arm    § Shortness of breath    § Nausea or vomiting    § Lightheadedness or a sudden cold sweat    · You become confused or have difficulty speaking  · You suddenly feel lightheaded or have trouble breathing  Seek care immediately if:   · You have a severe headache or vision loss  · You have weakness in an arm or leg  Call your doctor or cardiologist if:   · You feel faint, dizzy, confused, or drowsy  · You have been taking your blood pressure medicine but your pressure is higher than your provider says it should be  · You have questions or concerns about your condition or care  Treatment for chronic hypertension  may include medicine to lower your blood pressure and cholesterol levels  A low cholesterol level helps prevent heart disease and makes it easier to control your blood pressure  Heart disease can make your blood pressure harder to control  You may also need to make lifestyle changes  What you need to know about the stages of hypertension:       · Normal blood pressure is 119/79 or lower   Your healthcare provider may only check your blood pressure each year if it stays at a normal level  · Elevated blood pressure is 120/79 to 129/79    This is sometimes called prehypertension  Your healthcare provider may suggest lifestyle changes to help lower your blood pressure to a normal level  He or she may then check it again in 3 to 6 months  · Stage 1 hypertension is 130/80  to 139/89   Your provider may recommend lifestyle changes, medication, and checks every 3 to 6 months until your blood pressure is controlled  · Stage 2 hypertension is 140/90 or higher   Your provider will recommend lifestyle changes and have you take 2 kinds of hypertension medicines  You will also need to have your blood pressure checked monthly until it is controlled  Manage chronic hypertension:   · Check your blood pressure at home  Avoid smoking, caffeine, and exercise at least 30 minutes before checking your blood pressure  Sit and rest for 5 minutes before you take your blood pressure  Extend your arm and support it on a flat surface  Your arm should be at the same level as your heart  Follow the directions that came with your blood pressure monitor  Check your blood pressure 2 times, 1 minute apart, before you take your medicine in the morning  Also check your blood pressure before your evening meal  Keep a record of your readings and bring it to your follow-up visits  Ask your healthcare provider what your blood pressure should be  · Manage any other health conditions you have  Health conditions such as diabetes can increase your risk for hypertension  Follow your healthcare provider's instructions and take all your medicines as directed  Talk to your healthcare provider about any new health conditions you have recently developed  · Ask about all medicines  Certain medicines can increase your blood pressure  Examples include oral birth control pills, decongestants, herbal supplements, and NSAIDs, such as ibuprofen  Your healthcare provider can tell you which medicines are safe for you to take   This includes prescription and over-the-counter medicines  Lifestyle changes you can make to lower your blood pressure: Your provider may want you to make more lifestyle changes if you are having trouble controlling your blood pressure  This may feel difficult over time, especially if you think you are making good changes but your pressure is still high  It might help to focus on one new change at a time  For example, try to add 1 more day of exercise, or exercise for an extra 10 minutes on 2 days  Small changes can make a big difference  Your healthcare provider can also refer you to specialists such as a dietitian who can help you make small changes  Your family members may be included in helping you learn to create lifestyle changes, such as the following:     · Limit sodium (salt) as directed  Too much sodium can affect your fluid balance  Check labels to find low-sodium or no-salt-added foods  Some low-sodium foods use potassium salts for flavor  Too much potassium can also cause health problems  Your healthcare provider will tell you how much sodium and potassium are safe for you to have in a day  He or she may recommend that you limit sodium to 2,300 mg a day  · Follow the meal plan recommended by your healthcare provider  A dietitian or your provider can give you more information on low-sodium plans or the DASH (Dietary Approaches to Stop Hypertension) eating plan  The DASH plan is low in sodium, processed sugar, unhealthy fats, and total fat  It is high in potassium, calcium, and fiber  These can be found in vegetables, fruit, and whole-grain foods  · Be physically active throughout the day  Physical activity, such as exercise, can help control your blood pressure and your weight  Be physically active for at least 30 minutes per day, on most days of the week  Include aerobic activity, such as walking or riding a bicycle  Also include strength training at least 2 times each week   Your healthcare providers can help you create a physical activity plan  · Decrease stress  This may help lower your blood pressure  Learn ways to relax, such as deep breathing or listening to music  · Limit alcohol as directed  Alcohol can increase your blood pressure  A drink of alcohol is 12 ounces of beer, 5 ounces of wine, or 1½ ounces of liquor  · Do not smoke  Nicotine and other chemicals in cigarettes and cigars can increase your blood pressure and also cause lung damage  Ask your healthcare provider for information if you currently smoke and need help to quit  E-cigarettes or smokeless tobacco still contain nicotine  Talk to your healthcare provider before you use these products  Follow up with your doctor or cardiologist as directed: You will need to return to have your blood pressure checked and to have other lab tests done  Write down your questions so you remember to ask them during your visits  © Copyright [employer] 2022 Information is for End User's use only and may not be sold, redistributed or otherwise used for commercial purposes  All illustrations and images included in CareNotes® are the copyrighted property of [employer]  or [facility] Sanju[facility] Salazar   The above information is an  only  It is not intended as medical advice for individual conditions or treatments  Talk to your doctor, nurse or pharmacist before following any medical regimen to see if it is safe and effective for you

## 2022-04-12 ENCOUNTER — APPOINTMENT (OUTPATIENT)
Dept: LAB | Facility: CLINIC | Age: 72
End: 2022-04-12
Payer: COMMERCIAL

## 2022-04-12 DIAGNOSIS — R97.20 ELEVATED PROSTATE SPECIFIC ANTIGEN (PSA): ICD-10-CM

## 2022-04-12 LAB — PSA SERPL-MCNC: 7.8 NG/ML (ref 0–4)

## 2022-04-12 PROCEDURE — 84153 ASSAY OF PSA TOTAL: CPT

## 2022-05-25 DIAGNOSIS — I10 BENIGN ESSENTIAL HYPERTENSION: ICD-10-CM

## 2022-05-25 RX ORDER — LOSARTAN POTASSIUM AND HYDROCHLOROTHIAZIDE 12.5; 5 MG/1; MG/1
1 TABLET ORAL DAILY
Qty: 90 TABLET | Refills: 0 | Status: SHIPPED | OUTPATIENT
Start: 2022-05-25

## 2022-08-21 DIAGNOSIS — I10 BENIGN ESSENTIAL HYPERTENSION: ICD-10-CM

## 2022-08-21 RX ORDER — LOSARTAN POTASSIUM AND HYDROCHLOROTHIAZIDE 12.5; 5 MG/1; MG/1
TABLET ORAL
Qty: 90 TABLET | Refills: 0 | Status: SHIPPED | OUTPATIENT
Start: 2022-08-21

## 2022-09-15 ENCOUNTER — APPOINTMENT (OUTPATIENT)
Dept: LAB | Facility: CLINIC | Age: 72
End: 2022-09-15
Payer: COMMERCIAL

## 2022-09-15 DIAGNOSIS — I10 BENIGN ESSENTIAL HYPERTENSION: ICD-10-CM

## 2022-09-15 DIAGNOSIS — R73.01 IMPAIRED FASTING GLUCOSE: ICD-10-CM

## 2022-09-15 DIAGNOSIS — Z12.5 SCREENING FOR PROSTATE CANCER: ICD-10-CM

## 2022-09-15 DIAGNOSIS — E78.2 MIXED HYPERLIPIDEMIA: ICD-10-CM

## 2022-09-15 LAB
ALBUMIN SERPL BCP-MCNC: 3.5 G/DL (ref 3.5–5)
ALP SERPL-CCNC: 90 U/L (ref 46–116)
ALT SERPL W P-5'-P-CCNC: 28 U/L (ref 12–78)
ANION GAP SERPL CALCULATED.3IONS-SCNC: 1 MMOL/L (ref 4–13)
AST SERPL W P-5'-P-CCNC: 20 U/L (ref 5–45)
BILIRUB SERPL-MCNC: 1.64 MG/DL (ref 0.2–1)
BUN SERPL-MCNC: 21 MG/DL (ref 5–25)
CALCIUM SERPL-MCNC: 9.2 MG/DL (ref 8.3–10.1)
CHLORIDE SERPL-SCNC: 107 MMOL/L (ref 96–108)
CHOLEST SERPL-MCNC: 128 MG/DL
CO2 SERPL-SCNC: 30 MMOL/L (ref 21–32)
CREAT SERPL-MCNC: 1.36 MG/DL (ref 0.6–1.3)
ERYTHROCYTE [DISTWIDTH] IN BLOOD BY AUTOMATED COUNT: 13.2 % (ref 11.6–15.1)
GFR SERPL CREATININE-BSD FRML MDRD: 52 ML/MIN/1.73SQ M
GLUCOSE P FAST SERPL-MCNC: 115 MG/DL (ref 65–99)
HCT VFR BLD AUTO: 47.8 % (ref 36.5–49.3)
HDLC SERPL-MCNC: 51 MG/DL
HGB BLD-MCNC: 16 G/DL (ref 12–17)
LDLC SERPL CALC-MCNC: 63 MG/DL (ref 0–100)
MCH RBC QN AUTO: 30.4 PG (ref 26.8–34.3)
MCHC RBC AUTO-ENTMCNC: 33.5 G/DL (ref 31.4–37.4)
MCV RBC AUTO: 91 FL (ref 82–98)
NONHDLC SERPL-MCNC: 77 MG/DL
PLATELET # BLD AUTO: 247 THOUSANDS/UL (ref 149–390)
PMV BLD AUTO: 8.9 FL (ref 8.9–12.7)
POTASSIUM SERPL-SCNC: 5 MMOL/L (ref 3.5–5.3)
PROT SERPL-MCNC: 7.6 G/DL (ref 6.4–8.4)
PSA SERPL-MCNC: 9.5 NG/ML (ref 0–4)
RBC # BLD AUTO: 5.27 MILLION/UL (ref 3.88–5.62)
SODIUM SERPL-SCNC: 138 MMOL/L (ref 135–147)
TRIGL SERPL-MCNC: 68 MG/DL
WBC # BLD AUTO: 5.18 THOUSAND/UL (ref 4.31–10.16)

## 2022-09-15 PROCEDURE — 36415 COLL VENOUS BLD VENIPUNCTURE: CPT

## 2022-09-15 PROCEDURE — G0103 PSA SCREENING: HCPCS

## 2022-09-15 PROCEDURE — 80053 COMPREHEN METABOLIC PANEL: CPT

## 2022-09-15 PROCEDURE — 85027 COMPLETE CBC AUTOMATED: CPT

## 2022-09-15 PROCEDURE — 80061 LIPID PANEL: CPT

## 2022-09-29 ENCOUNTER — OFFICE VISIT (OUTPATIENT)
Dept: INTERNAL MEDICINE CLINIC | Facility: CLINIC | Age: 72
End: 2022-09-29
Payer: COMMERCIAL

## 2022-09-29 VITALS
BODY MASS INDEX: 25 KG/M2 | DIASTOLIC BLOOD PRESSURE: 78 MMHG | WEIGHT: 181.8 LBS | SYSTOLIC BLOOD PRESSURE: 130 MMHG | HEART RATE: 70 BPM

## 2022-09-29 DIAGNOSIS — R97.20 ELEVATED PSA: ICD-10-CM

## 2022-09-29 DIAGNOSIS — E78.2 MIXED HYPERLIPIDEMIA: ICD-10-CM

## 2022-09-29 DIAGNOSIS — R73.03 PREDIABETES: ICD-10-CM

## 2022-09-29 DIAGNOSIS — Z13.39 SCREENING FOR ALCOHOL PROBLEM: ICD-10-CM

## 2022-09-29 DIAGNOSIS — I10 BENIGN ESSENTIAL HYPERTENSION: Primary | ICD-10-CM

## 2022-09-29 DIAGNOSIS — Z00.00 MEDICARE ANNUAL WELLNESS VISIT, SUBSEQUENT: ICD-10-CM

## 2022-09-29 PROCEDURE — 3288F FALL RISK ASSESSMENT DOCD: CPT | Performed by: INTERNAL MEDICINE

## 2022-09-29 PROCEDURE — 1160F RVW MEDS BY RX/DR IN RCRD: CPT | Performed by: INTERNAL MEDICINE

## 2022-09-29 PROCEDURE — 3078F DIAST BP <80 MM HG: CPT | Performed by: INTERNAL MEDICINE

## 2022-09-29 PROCEDURE — G0442 ANNUAL ALCOHOL SCREEN 15 MIN: HCPCS | Performed by: INTERNAL MEDICINE

## 2022-09-29 PROCEDURE — 3075F SYST BP GE 130 - 139MM HG: CPT | Performed by: INTERNAL MEDICINE

## 2022-09-29 PROCEDURE — G0444 DEPRESSION SCREEN ANNUAL: HCPCS | Performed by: INTERNAL MEDICINE

## 2022-09-29 PROCEDURE — 3725F SCREEN DEPRESSION PERFORMED: CPT | Performed by: INTERNAL MEDICINE

## 2022-09-29 PROCEDURE — 99214 OFFICE O/P EST MOD 30 MIN: CPT | Performed by: INTERNAL MEDICINE

## 2022-09-29 PROCEDURE — 1170F FXNL STATUS ASSESSED: CPT | Performed by: INTERNAL MEDICINE

## 2022-09-29 PROCEDURE — 1125F AMNT PAIN NOTED PAIN PRSNT: CPT | Performed by: INTERNAL MEDICINE

## 2022-09-29 PROCEDURE — G0439 PPPS, SUBSEQ VISIT: HCPCS | Performed by: INTERNAL MEDICINE

## 2022-09-29 NOTE — PROGRESS NOTES
Assessment and Plan:     Problem List Items Addressed This Visit        Cardiovascular and Mediastinum    Benign essential hypertension - Primary     Stable at this time  Denies any cardiac symptoms  Continue losartan and hydrochlorothiazide  Other    Prediabetes     Carbohydrates in moderation  Will monitor A1c  Relevant Orders    Hemoglobin A1C    Mixed hyperlipidemia     Stable and very well controlled  Stay active  Continue Crestor  Relevant Orders    Comprehensive metabolic panel    CBC    Lipid panel    Elevated PSA     Negative prostate biopsy in the past   PSA is rising again  Has follow-up coming up with Urology  Other Visit Diagnoses     Medicare annual wellness visit, subsequent        Screening for alcohol problem              Depression Screening and Follow-up Plan: Patient was screened for depression during today's encounter  They screened negative with a PHQ-2 score of 2  Preventive health issues were discussed with patient, and age appropriate screening tests were ordered as noted in patient's After Visit Summary  Personalized health advice and appropriate referrals for health education or preventive services given if needed, as noted in patient's After Visit Summary  History of Present Illness:     Patient presents for a Medicare Wellness Visit    Patient presents for routine follow-up to go over lab work  His PSA went back up again  He has follow-up with Urology coming up  He has had negative prostate biopsy in the past   Denies any recent cardiac symptoms  Does like his sweets  Fasting sugar levels is still little high  His A1c has been 5 8%  Patient Care Team:  Sherin Ronquillo DO as PCP - General (Internal Medicine)  Sherin Ronquillo DO as PCP - [address] [place] [address] (RTE)  Alecia Escalante MD (Urology)     Review of Systems:     Review of Systems   Constitutional: Negative for activity change, appetite change and fatigue  Respiratory: Negative for apnea, cough, chest tightness, shortness of breath and wheezing  Cardiovascular: Negative for chest pain, palpitations and leg swelling  Gastrointestinal: Negative for abdominal distention, abdominal pain, blood in stool, constipation, diarrhea, nausea and vomiting  Musculoskeletal: Positive for arthralgias  Neurological: Negative for dizziness, weakness, light-headedness, numbness and headaches  Psychiatric/Behavioral: Negative for behavioral problems, confusion, hallucinations, sleep disturbance and suicidal ideas  The patient is not nervous/anxious         Problem List:     Patient Active Problem List   Diagnosis    Benign essential hypertension    Mixed hyperlipidemia    Prediabetes    History of colon polyps    Elevated PSA      Past Medical and Surgical History:     Past Medical History:   Diagnosis Date    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    Hemorrhoids     Inflamed seborrheic keratosis     Multiple benign polyps of large intestine     History of personal colonic polyps    Neoplasm of uncertain behavior of skin     Sebaceous cyst      Past Surgical History:   Procedure Laterality Date    HAND SURGERY Left     thumb ligament repair    INGUINAL HERNIA REPAIR      KNEE ARTHROSCOPY Left     TONSILLECTOMY        Family History:     Family History   Problem Relation Age of Onset    Stroke Mother         stroke syndrome    Other Father         hematologic disorder      Social History:     Social History     Socioeconomic History    Marital status: /Civil Union     Spouse name: None    Number of children: None    Years of education: None    Highest education level: None   Occupational History    None   Tobacco Use    Smoking status: Former Smoker     Packs/day: 0 50     Years: 5 00     Pack years: 2 50     Types: Cigarettes     Quit date:      Years since quittin 7    Smokeless tobacco: Never Used   Vaping Use  Vaping Use: Never used   Substance and Sexual Activity    Alcohol use: Not Currently     Alcohol/week: 0 0 standard drinks    Drug use: No    Sexual activity: Yes     Partners: Female     Birth control/protection: Male Sterilization   Other Topics Concern    None   Social History Narrative    Active advance directive     Social Determinants of Health     Financial Resource Strain: Low Risk     Difficulty of Paying Living Expenses: Not hard at all   Food Insecurity: Not on file   Transportation Needs: No Transportation Needs    Lack of Transportation (Medical): No    Lack of Transportation (Non-Medical): No   Physical Activity: Not on file   Stress: Not on file   Social Connections: Not on file   Intimate Partner Violence: Not on file   Housing Stability: Not on file      Medications and Allergies:     Current Outpatient Medications   Medication Sig Dispense Refill    losartan-hydrochlorothiazide (HYZAAR) 50-12 5 mg per tablet TAKE 1 TABLET BY MOUTH EVERY DAY 90 tablet 0    rosuvastatin (CRESTOR) 5 mg tablet Take 1 tablet (5 mg total) by mouth daily 90 tablet 3     No current facility-administered medications for this visit       No Known Allergies   Immunizations:     Immunization History   Administered Date(s) Administered    COVID-19 MODERNA VACC 0 25 ML IM BOOSTER 12/15/2021    COVID-19 MODERNA VACC 0 5 ML IM 03/16/2021, 04/18/2021    INFLUENZA 10/14/2013, 11/02/2021    Influenza Split High Dose Preservative Free IM 10/07/2017, 10/19/2019    Influenza, high dose seasonal 0 7 mL 11/12/2020    Influenza, seasonal, injectable 10/01/2015, 10/01/2016    Pneumococcal Conjugate 13-Valent 02/27/2018    Pneumococcal Polysaccharide PPV23 11/19/2018    Tdap 1950    Zoster 12/03/2014    Zoster Vaccine Recombinant 10/24/2019, 01/03/2020      Health Maintenance:         Topic Date Due    Colorectal Cancer Screening  10/26/2023    Hepatitis C Screening  Completed         Topic Date Due    COVID-19 Vaccine (4 - Booster for Moderna series) 04/15/2022    Influenza Vaccine (1) 09/01/2022      Medicare Screening Tests and Risk Assessments:     Stefan Telles is here for his Subsequent Wellness visit  Last Medicare Wellness visit information reviewed, patient interviewed and updates made to the record today  Health Risk Assessment:   Patient rates overall health as very good  Patient feels that their physical health rating is same  Patient is very satisfied with their life  Eyesight was rated as same  Hearing was rated as same  Patient feels that their emotional and mental health rating is same  Patients states they are never, rarely angry  Patient states they are never, rarely unusually tired/fatigued  Pain experienced in the last 7 days has been none  Patient states that he has experienced no weight loss or gain in last 6 months  Depression Screening:   PHQ-2 Score: 2      Fall Risk Screening: In the past year, patient has experienced: no history of falling in past year      Home Safety:  Patient does not have trouble with stairs inside or outside of their home  Patient has working smoke alarms and has working carbon monoxide detector  Home safety hazards include: none  Nutrition:   Current diet is Regular, No Added Salt and Limited junk food  Medications:   Patient is not currently taking any over-the-counter supplements  Patient is able to manage medications  Activities of Daily Living (ADLs)/Instrumental Activities of Daily Living (IADLs):   Walk and transfer into and out of bed and chair?: Yes  Dress and groom yourself?: Yes    Bathe or shower yourself?: Yes    Feed yourself?  Yes  Do your laundry/housekeeping?: Yes  Manage your money, pay your bills and track your expenses?: Yes  Make your own meals?: Yes    Do your own shopping?: Yes    Durable Medical Equipment Suppliers  N/A    Previous Hospitalizations:   Any hospitalizations or ED visits within the last 12 months?: No      Advance Care Planning:   Living will: Yes    Durable POA for healthcare: Yes    Advanced directive: Yes    Five wishes given: No      Cognitive Screening:   Provider or family/friend/caregiver concerned regarding cognition?: No    PREVENTIVE SCREENINGS      Cardiovascular Screening:    General: Screening Not Indicated and History Lipid Disorder      Diabetes Screening:     General: Screening Current      Colorectal Cancer Screening:     General: Screening Current      Prostate Cancer Screening:    General: Screening Current      Osteoporosis Screening:    General: Screening Not Indicated      Abdominal Aortic Aneurysm (AAA) Screening:    Risk factors include: age between 73-69 yo and tobacco use        General: Screening Current      Lung Cancer Screening:     General: Screening Not Indicated      Hepatitis C Screening:    General: Screening Current    Screening, Brief Intervention, and Referral to Treatment (SBIRT)    Screening  Typical number of drinks in a day: 0  Typical number of drinks in a week: 1  Interpretation: Low risk drinking behavior  AUDIT-C Screenin) How often did you have a drink containing alcohol in the past year? 2 to 4 times a month  2) How many drinks did you have on a typical day when you were drinking in the past year? 0  3) How often did you have 6 or more drinks on one occasion in the past year? never    AUDIT-C Score: 2  Interpretation: Score 0-3 (male): Negative screen for alcohol misuse    Single Item Drug Screening:  How often have you used an illegal drug (including marijuana) or a prescription medication for non-medical reasons in the past year? never    Single Item Drug Screen Score: 0  Interpretation: Negative screen for possible drug use disorder    Brief Intervention  Alcohol & drug use screenings were reviewed  No concerns regarding substance use disorder identified       Other Counseling Topics:   Car/seat belt/driving safety, skin self-exam, sunscreen and regular weightbearing exercise  Physical Exam:     /78   Pulse 70   Wt 82 5 kg (181 lb 12 8 oz)   BMI 25 00 kg/m²     Physical Exam  Constitutional:       General: He is not in acute distress  Appearance: He is not ill-appearing  Cardiovascular:      Rate and Rhythm: Normal rate and regular rhythm  Heart sounds: No murmur heard  Pulmonary:      Effort: Pulmonary effort is normal  No respiratory distress  Breath sounds: No wheezing  Abdominal:      General: Bowel sounds are normal  There is no distension  Tenderness: There is no abdominal tenderness  Musculoskeletal:      Right lower leg: No edema  Left lower leg: No edema  Neurological:      Mental Status: He is alert         Jim Miguel, DO

## 2022-09-29 NOTE — PATIENT INSTRUCTIONS
Medicare Preventive Visit Patient Instructions  Thank you for completing your Welcome to Medicare Visit or Medicare Annual Wellness Visit today  Your next wellness visit will be due in one year (9/30/2023)  The screening/preventive services that you may require over the next 5-10 years are detailed below  Some tests may not apply to you based off risk factors and/or age  Screening tests ordered at today's visit but not completed yet may show as past due  Also, please note that scanned in results may not display below  Preventive Screenings:  Service Recommendations Previous Testing/Comments   Colorectal Cancer Screening  Colonoscopy    Fecal Occult Blood Test (FOBT)/Fecal Immunochemical Test (FIT)  Fecal DNA/Cologuard Test  Flexible Sigmoidoscopy Age: 39-70 years old   Colonoscopy: every 10 years (May be performed more frequently if at higher risk)  OR  FOBT/FIT: every 1 year  OR  Cologuard: every 3 years  OR  Sigmoidoscopy: every 5 years  Screening may be recommended earlier than age 39 if at higher risk for colorectal cancer  Also, an individualized decision between you and your healthcare provider will decide whether screening between the ages of 74-80 would be appropriate  Colonoscopy: 10/26/2020  FOBT/FIT: Not on file  Cologuard: Not on file  Sigmoidoscopy: Not on file          Prostate Cancer Screening Individualized decision between patient and health care provider in men between ages of 53-78   Medicare will cover every 12 months beginning on the day after your 50th birthday PSA: 9 5 ng/mL           Hepatitis C Screening Once for adults born between 1945 and 1965  More frequently in patients at high risk for Hepatitis C Hep C Antibody: 09/01/2020        Diabetes Screening 1-2 times per year if you're at risk for diabetes or have pre-diabetes Fasting glucose: 115 mg/dL (9/15/2022)  A1C: 5 8 % (3/16/2022)      Cholesterol Screening Once every 5 years if you don't have a lipid disorder   May order more often based on risk factors  Lipid panel: 09/15/2022         Other Preventive Screenings Covered by Medicare:  Abdominal Aortic Aneurysm (AAA) Screening: covered once if your at risk  You're considered to be at risk if you have a family history of AAA or a male between the age of 73-68 who smoking at least 100 cigarettes in your lifetime  Lung Cancer Screening: covers low dose CT scan once per year if you meet all of the following conditions: (1) Age 50-69; (2) No signs or symptoms of lung cancer; (3) Current smoker or have quit smoking within the last 15 years; (4) You have a tobacco smoking history of at least 20 pack years (packs per day x number of years you smoked); (5) You get a written order from a healthcare provider  Glaucoma Screening: covered annually if you're considered high risk: (1) You have diabetes OR (2) Family history of glaucoma OR (3)  aged 48 and older OR (3)  American aged 72 and older  Osteoporosis Screening: covered every 2 years if you meet one of the following conditions: (1) Have a vertebral abnormality; (2) On glucocorticoid therapy for more than 3 months; (3) Have primary hyperparathyroidism; (4) On osteoporosis medications and need to assess response to drug therapy  HIV Screening: covered annually if you're between the age of 12-76  Also covered annually if you are younger than 13 and older than 72 with risk factors for HIV infection  For pregnant patients, it is covered up to 3 times per pregnancy      Immunizations:  Immunization Recommendations   Influenza Vaccine Annual influenza vaccination during flu season is recommended for all persons aged >= 6 months who do not have contraindications   Pneumococcal Vaccine   * Pneumococcal conjugate vaccine = PCV13 (Prevnar 13), PCV15 (Vaxneuvance), PCV20 (Prevnar 20)  * Pneumococcal polysaccharide vaccine = PPSV23 (Pneumovax) Adults 25-60 years old: 1-3 doses may be recommended based on certain risk factors  Adults 65+ years old: 1-2 doses may be recommended based off what pneumonia vaccine you previously received   Hepatitis B Vaccine 3 dose series if at intermediate or high risk (ex: diabetes, end stage renal disease, liver disease)   Tetanus (Td) Vaccine - COST NOT COVERED BY MEDICARE PART B Following completion of primary series, a booster dose should be given every 10 years to maintain immunity against tetanus  Td may also be given as tetanus wound prophylaxis  Tdap Vaccine - COST NOT COVERED BY MEDICARE PART B Recommended at least once for all adults  For pregnant patients, recommended with each pregnancy  Shingles Vaccine (Shingrix) - COST NOT COVERED BY MEDICARE PART B  2 shot series recommended in those aged 48 and above     Health Maintenance Due:      Topic Date Due    Colorectal Cancer Screening  10/26/2023    Hepatitis C Screening  Completed     Immunizations Due:      Topic Date Due    COVID-19 Vaccine (4 - Booster for Moderna series) 04/15/2022    Influenza Vaccine (1) 09/01/2022     Advance Directives   What are advance directives? Advance directives are legal documents that state your wishes and plans for medical care  These plans are made ahead of time in case you lose your ability to make decisions for yourself  Advance directives can apply to any medical decision, such as the treatments you want, and if you want to donate organs  What are the types of advance directives? There are many types of advance directives, and each state has rules about how to use them  You may choose a combination of any of the following:  Living will: This is a written record of the treatment you want  You can also choose which treatments you do not want, which to limit, and which to stop at a certain time  This includes surgery, medicine, IV fluid, and tube feedings  Durable power of  for healthcare [place] [facility]):   This is a written record that states who you want to make healthcare choices for you when you are unable to make them for yourself  This person, called a proxy, is usually a family member or a friend  You may choose more than 1 proxy  Do not resuscitate (DNR) order:  A DNR order is used in case your heart stops beating or you stop breathing  It is a request not to have certain forms of treatment, such as CPR  A DNR order may be included in other types of advance directives  Medical directive: This covers the care that you want if you are in a coma, near death, or unable to make decisions for yourself  You can list the treatments you want for each condition  Treatment may include pain medicine, surgery, blood transfusions, dialysis, IV or tube feedings, and a ventilator (breathing machine)  Values history: This document has questions about your views, beliefs, and how you feel and think about life  This information can help others choose the care that you would choose  Why are advance directives important? An advance directive helps you control your care  Although spoken wishes may be used, it is better to have your wishes written down  Spoken wishes can be misunderstood, or not followed  Treatments may be given even if you do not want them  An advance directive may make it easier for your family to make difficult choices about your care  Weight Management   Why it is important to manage your weight:  Being overweight increases your risk of health conditions such as heart disease, high blood pressure, type 2 diabetes, and certain types of cancer  It can also increase your risk for osteoarthritis, sleep apnea, and other respiratory problems  Aim for a slow, steady weight loss  Even a small amount of weight loss can lower your risk of health problems  How to lose weight safely:  A safe and healthy way to lose weight is to eat fewer calories and get regular exercise  You can lose up about 1 pound a week by decreasing the number of calories you eat by 500 calories each day     Healthy meal plan for weight management: A healthy meal plan includes a variety of foods, contains fewer calories, and helps you stay healthy  A healthy meal plan includes the following:  Eat whole-grain foods more often  A healthy meal plan should contain fiber  Fiber is the part of grains, fruits, and vegetables that is not broken down by your body  Whole-grain foods are healthy and provide extra fiber in your diet  Some examples of whole-grain foods are whole-wheat breads and pastas, oatmeal, brown rice, and bulgur  Eat a variety of vegetables every day  Include dark, leafy greens such as spinach, kale, anika greens, and mustard greens  Eat yellow and orange vegetables such as carrots, sweet potatoes, and winter squash  Eat a variety of fruits every day  Choose fresh or canned fruit (canned in its own juice or light syrup) instead of juice  Fruit juice has very little or no fiber  Eat low-fat dairy foods  Drink fat-free (skim) milk or 1% milk  Eat fat-free yogurt and low-fat cottage cheese  Try low-fat cheeses such as mozzarella and other reduced-fat cheeses  Choose meat and other protein foods that are low in fat  Choose beans or other legumes such as split peas or lentils  Choose fish, skinless poultry (chicken or turkey), or lean cuts of red meat (beef or pork)  Before you cook meat or poultry, cut off any visible fat  Use less fat and oil  Try baking foods instead of frying them  Add less fat, such as margarine, sour cream, regular salad dressing and mayonnaise to foods  Eat fewer high-fat foods  Some examples of high-fat foods include french fries, doughnuts, ice cream, and cakes  Eat fewer sweets  Limit foods and drinks that are high in sugar  This includes candy, cookies, regular soda, and sweetened drinks  Exercise:  Exercise at least 30 minutes per day on most days of the week  Some examples of exercise include walking, biking, dancing, and swimming   You can also fit in more physical activity by taking the stairs instead of the elevator or parking farther away from stores  Ask your healthcare provider about the best exercise plan for you  © Copyright "[employer] 2018 Information is for End User's use only and may not be sold, redistributed or otherwise used for commercial purposes   All illustrations and images included in CareNotes® are the copyrighted property of A D A M , Inc  or [address] [place] [address] [employer]

## 2023-03-29 ENCOUNTER — APPOINTMENT (OUTPATIENT)
Dept: LAB | Facility: CLINIC | Age: 73
End: 2023-03-29

## 2023-03-29 DIAGNOSIS — R73.03 PREDIABETES: ICD-10-CM

## 2023-03-29 DIAGNOSIS — R97.20 ELEVATED PROSTATE SPECIFIC ANTIGEN (PSA): ICD-10-CM

## 2023-03-29 DIAGNOSIS — E78.2 MIXED HYPERLIPIDEMIA: ICD-10-CM

## 2023-03-29 LAB
ALBUMIN SERPL BCP-MCNC: 3.9 G/DL (ref 3.5–5)
ALP SERPL-CCNC: 78 U/L (ref 46–116)
ALT SERPL W P-5'-P-CCNC: 26 U/L (ref 12–78)
ANION GAP SERPL CALCULATED.3IONS-SCNC: 0 MMOL/L (ref 4–13)
AST SERPL W P-5'-P-CCNC: 19 U/L (ref 5–45)
BILIRUB SERPL-MCNC: 1.24 MG/DL (ref 0.2–1)
BUN SERPL-MCNC: 21 MG/DL (ref 5–25)
CALCIUM SERPL-MCNC: 9.6 MG/DL (ref 8.3–10.1)
CHLORIDE SERPL-SCNC: 107 MMOL/L (ref 96–108)
CHOLEST SERPL-MCNC: 119 MG/DL
CO2 SERPL-SCNC: 32 MMOL/L (ref 21–32)
CREAT SERPL-MCNC: 1.22 MG/DL (ref 0.6–1.3)
ERYTHROCYTE [DISTWIDTH] IN BLOOD BY AUTOMATED COUNT: 12.7 % (ref 11.6–15.1)
GFR SERPL CREATININE-BSD FRML MDRD: 59 ML/MIN/1.73SQ M
GLUCOSE P FAST SERPL-MCNC: 109 MG/DL (ref 65–99)
HCT VFR BLD AUTO: 47.6 % (ref 36.5–49.3)
HDLC SERPL-MCNC: 46 MG/DL
HGB BLD-MCNC: 16.1 G/DL (ref 12–17)
LDLC SERPL CALC-MCNC: 59 MG/DL (ref 0–100)
MCH RBC QN AUTO: 30.7 PG (ref 26.8–34.3)
MCHC RBC AUTO-ENTMCNC: 33.8 G/DL (ref 31.4–37.4)
MCV RBC AUTO: 91 FL (ref 82–98)
NONHDLC SERPL-MCNC: 73 MG/DL
PLATELET # BLD AUTO: 260 THOUSANDS/UL (ref 149–390)
PMV BLD AUTO: 9.6 FL (ref 8.9–12.7)
POTASSIUM SERPL-SCNC: 4.4 MMOL/L (ref 3.5–5.3)
PROT SERPL-MCNC: 7.5 G/DL (ref 6.4–8.4)
PSA SERPL-MCNC: 7 NG/ML (ref 0–4)
RBC # BLD AUTO: 5.25 MILLION/UL (ref 3.88–5.62)
SODIUM SERPL-SCNC: 139 MMOL/L (ref 135–147)
TRIGL SERPL-MCNC: 70 MG/DL
WBC # BLD AUTO: 4.81 THOUSAND/UL (ref 4.31–10.16)

## 2023-03-30 LAB
EST. AVERAGE GLUCOSE BLD GHB EST-MCNC: 123 MG/DL
HBA1C MFR BLD: 5.9 %

## 2023-04-04 ENCOUNTER — OFFICE VISIT (OUTPATIENT)
Age: 73
End: 2023-04-04

## 2023-04-04 VITALS
RESPIRATION RATE: 20 BRPM | HEART RATE: 88 BPM | OXYGEN SATURATION: 98 % | TEMPERATURE: 97 F | WEIGHT: 176 LBS | HEIGHT: 73 IN | SYSTOLIC BLOOD PRESSURE: 120 MMHG | BODY MASS INDEX: 23.33 KG/M2 | DIASTOLIC BLOOD PRESSURE: 70 MMHG

## 2023-04-04 DIAGNOSIS — I10 BENIGN ESSENTIAL HYPERTENSION: ICD-10-CM

## 2023-04-04 DIAGNOSIS — R73.03 PREDIABETES: ICD-10-CM

## 2023-04-04 DIAGNOSIS — I12.9 HYPERTENSIVE KIDNEY DISEASE WITH STAGE 3A CHRONIC KIDNEY DISEASE (HCC): Primary | ICD-10-CM

## 2023-04-04 DIAGNOSIS — E78.2 MIXED HYPERLIPIDEMIA: ICD-10-CM

## 2023-04-04 DIAGNOSIS — R12 HEARTBURN: ICD-10-CM

## 2023-04-04 DIAGNOSIS — N18.31 HYPERTENSIVE KIDNEY DISEASE WITH STAGE 3A CHRONIC KIDNEY DISEASE (HCC): Primary | ICD-10-CM

## 2023-04-04 RX ORDER — PANTOPRAZOLE SODIUM 40 MG/1
40 TABLET, DELAYED RELEASE ORAL
Qty: 60 TABLET | Refills: 0 | Status: SHIPPED | OUTPATIENT
Start: 2023-04-04 | End: 2023-06-03

## 2023-04-04 RX ORDER — ROSUVASTATIN CALCIUM 5 MG/1
5 TABLET, COATED ORAL DAILY
Qty: 90 TABLET | Refills: 1 | Status: SHIPPED | OUTPATIENT
Start: 2023-04-04

## 2023-04-04 RX ORDER — LOSARTAN POTASSIUM AND HYDROCHLOROTHIAZIDE 12.5; 5 MG/1; MG/1
1 TABLET ORAL DAILY
Qty: 90 TABLET | Refills: 1 | Status: SHIPPED | OUTPATIENT
Start: 2023-04-04

## 2023-04-04 NOTE — PROGRESS NOTES
Name: Anca Pearl      : 1950      MRN: [id number]  Encounter Provider: Uriel Ford DO  Encounter Date: 2023   Encounter department: [address] [place] [address]  Hypertensive kidney disease with stage 3a chronic kidney disease ([facility] [address] )        Lab Units 23  0727 09/15/22  0752 22  0646   CREATININE mg/dL 1 22 1 36* 1 19   EGFR ml/min/1 73sq m 59 52 61     Discussed CKD with patient  Likely due to aging and HTN nephrosclerosis  BP is well controlled  Stay hydrated  Will repeat labs in 6 months and check for proteinuria  - Microalbumin / creatinine urine ratio; Future  - CBC; Future  - Basic metabolic panel; Future    2  Mixed hyperlipidemia    Cholesterol controlled  Stay on current statin dose  - Lipid panel; Future  - rosuvastatin (CRESTOR) 5 mg tablet; Take 1 tablet (5 mg total) by mouth daily  Dispense: 90 tablet; Refill: 1    3  Prediabetes    Most recent A1c was 5 9 % on 3/29/2023  Stay active and watch carb intake  Will monitor  4  Heartburn    Start trial of protonix  Work on making dietary changes  If still symptomatic, see GI for EGD  - pantoprazole (PROTONIX) 40 mg tablet; Take 1 tablet (40 mg total) by mouth daily before breakfast  Dispense: 60 tablet; Refill: 0    5  Benign essential hypertension  - losartan-hydrochlorothiazide (HYZAAR) 50-12 5 mg per tablet; Take 1 tablet by mouth daily  Dispense: 90 tablet; Refill: 1       Depression Screening and Follow-up Plan: Patient was screened for depression during today's encounter  They screened negative with a PHQ-2 score of 0  Return in about 6 months (around 10/5/2023) for Subsequent AWV  Julia Faulkner presents for follow-up  Doing well but has had some issues with heartburn recently  Review of Systems   Constitutional: Negative  Respiratory: Negative  Cardiovascular: Negative  Gastrointestinal: Negative for diarrhea, nausea and vomiting  "Heartburn     Objective     /70 (BP Location: Left arm, Patient Position: Sitting, Cuff Size: Standard)   Pulse 88   Temp (!) 97 °F (36 1 °C) (Tympanic)   Resp 20   Ht 6' 1\" (1 854 m)   Wt 79 8 kg (176 lb)   SpO2 98%   BMI 23 22 kg/m²     Physical Exam  Constitutional:       General: He is not in acute distress  Appearance: He is not ill-appearing  Cardiovascular:      Rate and Rhythm: Normal rate and regular rhythm  Heart sounds: No murmur heard  Pulmonary:      Effort: Pulmonary effort is normal  No respiratory distress  Breath sounds: No wheezing or rales  Abdominal:      General: Bowel sounds are normal  There is no distension  Tenderness: There is no abdominal tenderness  Musculoskeletal:      Right lower leg: No edema  Left lower leg: No edema  Neurological:      Mental Status: He is alert         Ilya Nothstein, DO    "

## 2023-06-19 DIAGNOSIS — R12 HEARTBURN: ICD-10-CM

## 2023-06-19 RX ORDER — PANTOPRAZOLE SODIUM 40 MG/1
40 TABLET, DELAYED RELEASE ORAL
Qty: 60 TABLET | Refills: 0 | Status: SHIPPED | OUTPATIENT
Start: 2023-06-19 | End: 2023-08-18

## 2023-08-09 ENCOUNTER — TELEPHONE (OUTPATIENT)
Age: 73
End: 2023-08-09

## 2023-08-23 ENCOUNTER — TELEPHONE (OUTPATIENT)
Age: 73
End: 2023-08-23

## 2023-08-23 ENCOUNTER — PREP FOR PROCEDURE (OUTPATIENT)
Age: 73
End: 2023-08-23

## 2023-08-23 DIAGNOSIS — Z86.010 HISTORY OF COLON POLYPS: Primary | ICD-10-CM

## 2023-08-23 NOTE — TELEPHONE ENCOUNTER
08/23/23  Screened by: Eduard Walker    Referring Provider     Pre- Screening: There is no height or weight on file to calculate BMI. Has patient been referred for a routine screening Colonoscopy? yes  Is the patient between 43-73 years old? yes      Previous Colonoscopy yes   If yes:    Date: 10/26/2020    Facility:     Reason:       SCHEDULING STAFF: If the patient is between 45yrs-49yrs, please advise patient to confirm benefits/coverage with their insurance company for a routine screening colonoscopy, some insurance carriers will only cover at [address] [place] [address] or older. If the patient is over 66years old, please schedule an office visit. Does the patient want to see a Gastroenterologist prior to their procedure OR are they having any GI symptoms? no    Has the patient been hospitalized or had abdominal surgery in the past 6 months? no    Does the patient use supplemental oxygen? no    Does the patient take Coumadin, Lovenox, Plavix, Elliquis, Xarelto, or other blood thinning medication? no    Has the patient had a stroke, cardiac event, or stent placed in the past year? no    SCHEDULING STAFF: If patient answers NO to above questions, then schedule procedure. If patient answers YES to above questions, then schedule office appointment. PT PASSED OA    If patient is between 45yrs - 49yrs, please advise patient that we will have to confirm benefits & coverage with their insurance company for a routine screening colonoscopy.

## 2023-08-23 NOTE — TELEPHONE ENCOUNTER
Scheduled date of colonoscopy (as of today): 10/30/23  Physician performing colonoscopy: Dr. Yolanda Rose  Location of colonoscopy: [facility]  Bowel prep reviewed with patient: Miralax/Dulcolax  Instructions reviewed with patient by: Reynaldo Deras.   Clearances: N/A

## 2023-09-18 DIAGNOSIS — R12 HEARTBURN: ICD-10-CM

## 2023-09-18 RX ORDER — PANTOPRAZOLE SODIUM 40 MG/1
40 TABLET, DELAYED RELEASE ORAL
Qty: 60 TABLET | Refills: 0 | Status: SHIPPED | OUTPATIENT
Start: 2023-09-18 | End: 2023-11-17

## 2023-10-02 ENCOUNTER — APPOINTMENT (OUTPATIENT)
Dept: LAB | Facility: HOSPITAL | Age: 73
End: 2023-10-02
Payer: COMMERCIAL

## 2023-10-02 DIAGNOSIS — N18.31 HYPERTENSIVE KIDNEY DISEASE WITH STAGE 3A CHRONIC KIDNEY DISEASE (HCC): ICD-10-CM

## 2023-10-02 DIAGNOSIS — I12.9 HYPERTENSIVE KIDNEY DISEASE WITH STAGE 3A CHRONIC KIDNEY DISEASE (HCC): ICD-10-CM

## 2023-10-02 DIAGNOSIS — E78.2 MIXED HYPERLIPIDEMIA: ICD-10-CM

## 2023-10-02 LAB
ANION GAP SERPL CALCULATED.3IONS-SCNC: 5 MMOL/L
BUN SERPL-MCNC: 17 MG/DL (ref 5–25)
CALCIUM SERPL-MCNC: 9.4 MG/DL (ref 8.4–10.2)
CHLORIDE SERPL-SCNC: 103 MMOL/L (ref 96–108)
CHOLEST SERPL-MCNC: 131 MG/DL
CO2 SERPL-SCNC: 31 MMOL/L (ref 21–32)
CREAT SERPL-MCNC: 1.04 MG/DL (ref 0.6–1.3)
CREAT UR-MCNC: 97.6 MG/DL
ERYTHROCYTE [DISTWIDTH] IN BLOOD BY AUTOMATED COUNT: 12.5 % (ref 11.6–15.1)
GFR SERPL CREATININE-BSD FRML MDRD: 71 ML/MIN/1.73SQ M
GLUCOSE P FAST SERPL-MCNC: 111 MG/DL (ref 65–99)
HCT VFR BLD AUTO: 45.4 % (ref 36.5–49.3)
HDLC SERPL-MCNC: 43 MG/DL
HGB BLD-MCNC: 15.3 G/DL (ref 12–17)
LDLC SERPL CALC-MCNC: 70 MG/DL (ref 0–100)
MCH RBC QN AUTO: 29.9 PG (ref 26.8–34.3)
MCHC RBC AUTO-ENTMCNC: 33.7 G/DL (ref 31.4–37.4)
MCV RBC AUTO: 89 FL (ref 82–98)
MICROALBUMIN UR-MCNC: 11.9 MG/L
MICROALBUMIN/CREAT 24H UR: 12 MG/G CREATININE (ref 0–30)
NONHDLC SERPL-MCNC: 88 MG/DL
PLATELET # BLD AUTO: 249 THOUSANDS/UL (ref 149–390)
PMV BLD AUTO: 8.7 FL (ref 8.9–12.7)
POTASSIUM SERPL-SCNC: 4 MMOL/L (ref 3.5–5.3)
RBC # BLD AUTO: 5.11 MILLION/UL (ref 3.88–5.62)
SODIUM SERPL-SCNC: 139 MMOL/L (ref 135–147)
TRIGL SERPL-MCNC: 89 MG/DL
WBC # BLD AUTO: 5.38 THOUSAND/UL (ref 4.31–10.16)

## 2023-10-02 PROCEDURE — 80061 LIPID PANEL: CPT

## 2023-10-02 PROCEDURE — 85027 COMPLETE CBC AUTOMATED: CPT

## 2023-10-02 PROCEDURE — 82570 ASSAY OF URINE CREATININE: CPT

## 2023-10-02 PROCEDURE — 36415 COLL VENOUS BLD VENIPUNCTURE: CPT

## 2023-10-02 PROCEDURE — 80048 BASIC METABOLIC PNL TOTAL CA: CPT

## 2023-10-02 PROCEDURE — 82043 UR ALBUMIN QUANTITATIVE: CPT

## 2023-10-12 ENCOUNTER — OFFICE VISIT (OUTPATIENT)
Age: 73
End: 2023-10-12
Payer: COMMERCIAL

## 2023-10-12 VITALS
DIASTOLIC BLOOD PRESSURE: 70 MMHG | TEMPERATURE: 97.3 F | WEIGHT: 177.6 LBS | SYSTOLIC BLOOD PRESSURE: 126 MMHG | HEIGHT: 73 IN | HEART RATE: 80 BPM | BODY MASS INDEX: 23.54 KG/M2 | OXYGEN SATURATION: 99 %

## 2023-10-12 DIAGNOSIS — R06.09 DYSPNEA ON EXERTION: ICD-10-CM

## 2023-10-12 DIAGNOSIS — I10 PRIMARY HYPERTENSION: Primary | Chronic | ICD-10-CM

## 2023-10-12 DIAGNOSIS — R73.03 PREDIABETES: ICD-10-CM

## 2023-10-12 DIAGNOSIS — E78.2 MIXED HYPERLIPIDEMIA: ICD-10-CM

## 2023-10-12 DIAGNOSIS — R12 HEARTBURN: ICD-10-CM

## 2023-10-12 DIAGNOSIS — Z00.00 MEDICARE ANNUAL WELLNESS VISIT, SUBSEQUENT: ICD-10-CM

## 2023-10-12 DIAGNOSIS — Z23 ENCOUNTER FOR IMMUNIZATION: ICD-10-CM

## 2023-10-12 PROCEDURE — G0008 ADMIN INFLUENZA VIRUS VAC: HCPCS | Performed by: INTERNAL MEDICINE

## 2023-10-12 PROCEDURE — 90662 IIV NO PRSV INCREASED AG IM: CPT | Performed by: INTERNAL MEDICINE

## 2023-10-12 PROCEDURE — 99214 OFFICE O/P EST MOD 30 MIN: CPT | Performed by: INTERNAL MEDICINE

## 2023-10-12 PROCEDURE — G0439 PPPS, SUBSEQ VISIT: HCPCS | Performed by: INTERNAL MEDICINE

## 2023-10-12 RX ORDER — PANTOPRAZOLE SODIUM 40 MG/1
40 TABLET, DELAYED RELEASE ORAL
Qty: 90 TABLET | Refills: 1 | Status: SHIPPED | OUTPATIENT
Start: 2023-10-12 | End: 2024-04-09

## 2023-10-12 RX ORDER — LOSARTAN POTASSIUM AND HYDROCHLOROTHIAZIDE 12.5; 5 MG/1; MG/1
1 TABLET ORAL DAILY
Qty: 90 TABLET | Refills: 1 | Status: SHIPPED | OUTPATIENT
Start: 2023-10-12 | End: 2024-04-09

## 2023-10-12 RX ORDER — ROSUVASTATIN CALCIUM 5 MG/1
5 TABLET, COATED ORAL DAILY
Qty: 90 TABLET | Refills: 1 | Status: SHIPPED | OUTPATIENT
Start: 2023-10-12 | End: 2024-04-09

## 2023-10-12 NOTE — ASSESSMENT & PLAN NOTE
The patient's fasting sugar level is still a little bit elevated in the prediabetic range. Patient's last A1c was 5.9% back in 03/2023. We will continue to monitor his A1c with repeat labs in 6 months.

## 2023-10-12 NOTE — PROGRESS NOTES
Assessment and Plan:     Problem List Items Addressed This Visit          Cardiovascular and Mediastinum    Primary hypertension - Primary (Chronic)     The patient's blood pressure is well controlled in the office today at 126/70 mmHg. He walks on a regular basis. He should continue current diet and continue taking losartan-hydrochlorothiazide daily. Relevant Medications    losartan-hydrochlorothiazide (HYZAAR) 50-12.5 mg per tablet    Other Relevant Orders    Basic metabolic panel       Other    Prediabetes     The patient's fasting sugar level is still a little bit elevated in the prediabetic range. Patient's last A1c was 5.9% back in 03/2023. We will continue to monitor his A1c with repeat labs in 6 months. Relevant Orders    Hemoglobin A1C    Mixed hyperlipidemia     The patient's cholesterol is very well controlled. He has no history of ASCVD. Will continue him on rosuvastatin 5 mg. Relevant Medications    rosuvastatin (CRESTOR) 5 mg tablet    Other Relevant Orders    CBC w/o differential    Lipid Panel with Direct LDL reflex    Heartburn     He can continue pantoprazole for now and refill was sent. Discussed trying to cut down on this medication or only using it every other day or on demand. Also discussed potential use of Tums or famotidine (Pepcid). Relevant Medications    pantoprazole (PROTONIX) 40 mg tablet    Dyspnea on exertion     The patient complains of some dyspnea on exertion when going up steps. He has no chest pain/chest pressure or radiation up into his jaw or down his arm. He has no diaphoresis. He has not had any cardiac testing done in the past 2 years. Will order an echocardiogram and nuclear stress test. The patient is able to go on the treadmill. Will call him with these results.             Relevant Orders    Echo complete w/ contrast if indicated    NM myocardial perfusion spect (stress and/or rest)     Other Visit Diagnoses       Medicare annual wellness visit, subsequent        Encounter for immunization        Relevant Orders    influenza vaccine, high-dose, PF 0.7 mL (FLUZONE HIGH-DOSE) (Completed)             Preventive health issues were discussed with patient, and age appropriate screening tests were ordered as noted in patient's After Visit Summary. Personalized health advice and appropriate referrals for health education or preventive services given if needed, as noted in patient's After Visit Summary. History of Present Illness:     Reed Wright a 71-year-old male patient who presents today for routine follow-up and Medicare wellness visit. He consents to the use of [facility] services today. He has a history of hypertension, prediabetes, mixed hyperlipidemia, and elevated PSA. Elevated PSA   The patient follows regularly with Dr. Twanya Medrano of urology for his elevated PSA. The patient's last two eGFR's in the past year were 46 and 61, which puts him in the stage 3a category for chronic kidney disease. Acid reflux   Today, the patient is feeling well. He took pantoprazole as prescribed for his acid reflux. At first, he took pantoprazole every day since it was working for him. He then started taking pantoprazole every other day. Two weeks ago, he ate a piece of lasagna and shoofly pie late at night which caused him to have acid reflux. Dyspnea   He experiences dyspnea a little faster than he used to which has been gradual in nature. He denies this is exertional, but he noticed it when he walked up the stairs of a 3-story condo. He walks 1.5 miles every day in the morning. Denies dyspnea at rest. He is able to easily catch his breath after reaching the top of the stairs. He had his last stress test in 2016. He denies chest pressure or chest discomfort radiating to the jaw or down the arm. He is able to walk on a treadmill. He is due for the new COVID-19 vaccine and annual flu vaccine.      Patient Care Team:  Felicity Mcduffie,  as PCP - General (Internal Medicine)  Ching Nugent DO as PCP - [address] (RTE)  Tristan Morales MD (Urology)     Review of Systems:     The pertinent positive and negative findings are as noted in the HPI.      Problem List:     Patient Active Problem List   Diagnosis    Primary hypertension    Mixed hyperlipidemia    Prediabetes    History of colon polyps    Elevated PSA    Hypertensive kidney disease with stage 3a chronic kidney disease (HCC)    Heartburn    Dyspnea on exertion      Past Medical and Surgical History:     Past Medical History:   Diagnosis Date    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    Hemorrhoids     Inflamed seborrheic keratosis     Multiple benign polyps of large intestine     History of personal colonic polyps    Neoplasm of uncertain behavior of skin     Sebaceous cyst      Past Surgical History:   Procedure Laterality Date    HAND SURGERY Left     thumb ligament repair    INGUINAL HERNIA REPAIR      KNEE ARTHROSCOPY Left     TONSILLECTOMY        Family History:     Family History   Problem Relation Age of Onset    Stroke Mother         stroke syndrome    Other Father         hematologic disorder      Social History:     Social History     Socioeconomic History    Marital status: /Civil Union     Spouse name: None    Number of children: None    Years of education: None    Highest education level: None   Occupational History    None   Tobacco Use    Smoking status: Former     Packs/day: 0.50     Years: 5.00     Total pack years: 2.50     Types: Cigarettes     Quit date:      Years since quittin.8    Smokeless tobacco: Never   Vaping Use    Vaping Use: Never used   Substance and Sexual Activity    Alcohol use: Not Currently     Alcohol/week: 0.0 standard drinks of alcohol    Drug use: No    Sexual activity: Yes     Partners: Female     Birth control/protection: Male Sterilization   Other Topics Concern    None   Social History Narrative Active advance directive     Social Determinants of Health     Financial Resource Strain: Low Risk  (10/10/2023)    Overall Financial Resource Strain (CARDIA)     Difficulty of Paying Living Expenses: Not hard at all   Food Insecurity: Not on file   Transportation Needs: No Transportation Needs (10/10/2023)    PRAPARE - Transportation     Lack of Transportation (Medical): No     Lack of Transportation (Non-Medical): No   Physical Activity: Sufficiently Active (3/9/2021)    Exercise Vital Sign     Days of Exercise per Week: 7 days     Minutes of Exercise per Session: 30 min   Stress: No Stress Concern Present (3/9/2021)    [facility]     Feeling of Stress : Not at all   Social Connections: Not on file   Intimate Partner Violence: Not on file   Housing Stability: Not on file      Medications and Allergies:     Current Outpatient Medications   Medication Sig Dispense Refill    losartan-hydrochlorothiazide (HYZAAR) 50-12.5 mg per tablet Take 1 tablet by mouth daily 90 tablet 1    pantoprazole (PROTONIX) 40 mg tablet Take 1 tablet (40 mg total) by mouth daily before breakfast 90 tablet 1    rosuvastatin (CRESTOR) 5 mg tablet Take 1 tablet (5 mg total) by mouth daily 90 tablet 1     No current facility-administered medications for this visit.      No Known Allergies   Immunizations:     Immunization History   Administered Date(s) Administered    COVID-19 MODERNA VACC 0.25 ML IM BOOSTER 12/15/2021    COVID-19 MODERNA VACC 0.5 ML IM 03/16/2021, 04/18/2021    INFLUENZA 10/14/2013, 11/02/2021    Influenza Split High Dose Preservative Free IM 10/07/2017, 10/19/2019    Influenza, high dose seasonal 0.7 mL 11/12/2020, 10/12/2023    Influenza, seasonal, injectable 10/01/2015, 10/01/2016    Pneumococcal Conjugate 13-Valent 02/27/2018    Pneumococcal Polysaccharide PPV23 11/19/2018    Tdap 1950    Zoster 12/03/2014    Zoster Vaccine Recombinant 10/24/2019, 01/03/2020      Health Maintenance:         Topic Date Due    Colorectal Cancer Screening  10/26/2023    Hepatitis C Screening  Completed         Topic Date Due    COVID-19 Vaccine (4 - Irma Basque series) 02/09/2022      Medicare Screening Tests and Risk Assessments:     Dann Garsia is here for his Subsequent Wellness visit. Last Medicare Wellness visit information reviewed, patient interviewed and updates made to the record today. Health Risk Assessment:   Patient rates overall health as very good. Patient feels that their physical health rating is same. Patient is very satisfied with their life. Eyesight was rated as same. Hearing was rated as same. Patient feels that their emotional and mental health rating is same. Patients states they are never, rarely angry. Patient states they are never, rarely unusually tired/fatigued. Pain experienced in the last 7 days has been none. Patient states that he has experienced no weight loss or gain in last 6 months. Fall Risk Screening: In the past year, patient has experienced: no history of falling in past year      Home Safety:  Patient does not have trouble with stairs inside or outside of their home. Patient has working smoke alarms and has working carbon monoxide detector. Home safety hazards include: none. Nutrition:   Current diet is Regular, No Added Salt and Limited junk food. Medications:   Patient is not currently taking any over-the-counter supplements. Patient is able to manage medications. Activities of Daily Living (ADLs)/Instrumental Activities of Daily Living (IADLs):   Walk and transfer into and out of bed and chair?: Yes  Dress and groom yourself?: Yes    Bathe or shower yourself?: Yes    Feed yourself?  Yes  Do your laundry/housekeeping?: Yes  Manage your money, pay your bills and track your expenses?: Yes  Make your own meals?: Yes    Do your own shopping?: Yes    Durable Medical Equipment Suppliers  N/A    Previous Hospitalizations:   Any hospitalizations or ED visits within the last 12 months?: No      Advance Care Planning:   Living will: Yes    Durable POA for healthcare: Yes    Advanced directive: Yes    Five wishes given: No      Cognitive Screening:   Provider or family/friend/caregiver concerned regarding cognition?: No    PREVENTIVE SCREENINGS      Cardiovascular Screening:    General: Screening Not Indicated and History Lipid Disorder      Diabetes Screening:     General: Screening Current      Colorectal Cancer Screening:     General: Screening Current      Prostate Cancer Screening:    General: Screening Current      Abdominal Aortic Aneurysm (AAA) Screening:    Risk factors include: age between 70-77 yo and tobacco use        Lung Cancer Screening:     General: Screening Not Indicated      Hepatitis C Screening:    General: Screening Current    Screening, Brief Intervention, and Referral to Treatment (SBIRT)    Screening  Typical number of drinks in a day: 0  Typical number of drinks in a week: 1  Interpretation: Low risk drinking behavior. AUDIT-C Screenin) How often did you have a drink containing alcohol in the past year? 2 to 4 times a month  2) How many drinks did you have on a typical day when you were drinking in the past year? 0  3) How often did you have 6 or more drinks on one occasion in the past year? never    AUDIT-C Score: 2  Interpretation: Score 0-3 (male): Negative screen for alcohol misuse    Single Item Drug Screening:  How often have you used an illegal drug (including marijuana) or a prescription medication for non-medical reasons in the past year? never    Single Item Drug Screen Score: 0  Interpretation: Negative screen for possible drug use disorder    Brief Intervention  Alcohol & drug use screenings were reviewed. No concerns regarding substance use disorder identified. Other Counseling Topics:   Car/seat belt/driving safety, skin self-exam, sunscreen and regular weightbearing exercise.       Physical Exam:     /70   Pulse 80   Temp (!) 97.3 °F (36.3 °C)   Ht 6' 1"   Wt 80.6 kg (177 lb 9.6 oz)   SpO2 99%   BMI 23.43 kg/m²     Physical Exam  Constitutional:       General: He is not in acute distress. Appearance: He is not ill-appearing. Cardiovascular:      Rate and Rhythm: Normal rate and regular rhythm. Heart sounds: No murmur heard. Pulmonary:      Effort: Pulmonary effort is normal. No respiratory distress. Breath sounds: No wheezing. Abdominal:      General: Bowel sounds are normal. There is no distension. Tenderness: There is no abdominal tenderness. Musculoskeletal:      Right lower leg: No edema. Left lower leg: No edema. Neurological:      Mental Status: He is alert. Results: In the most recent blood work that was done on 10/02/2023, his eGFR is up to 71 and creatinine is 1.04 mg/dL. His CBC is normal. His albumin/creatinine ratio is normal at 12 mg/g. His lipid panel shows total cholesterol 131 mg/dL, triglycerides 89 mg/dL, HDL 43 mg/dL, LDL 70 mg/dL. His last PSA was done by Dr. Silvestre Borden on 03/29/2023 and it was elevated at 7.0 ng/mL, which was a decrease from 09/15/2022 where it was 9.5 ng/mL. Transcribed for Kaleb Lancaster DO, by Ely Lopez on 10/12/23 at 11:48 AM. Powered by [employer].  (Reviewed by Blaine Patrick)

## 2023-10-12 NOTE — ASSESSMENT & PLAN NOTE
The patient complains of some dyspnea on exertion when going up steps. He has no chest pain/chest pressure or radiation up into his jaw or down his arm. He has no diaphoresis. He has not had any cardiac testing done in the past 2 years. Will order an echocardiogram and nuclear stress test. The patient is able to go on the treadmill. Will call him with these results.

## 2023-10-12 NOTE — ASSESSMENT & PLAN NOTE
The patient's cholesterol is very well controlled. He has no history of ASCVD. Will continue him on rosuvastatin 5 mg.

## 2023-10-12 NOTE — PATIENT INSTRUCTIONS
Medicare Preventive Visit Patient Instructions  Thank you for completing your Welcome to Medicare Visit or Medicare Annual Wellness Visit today. Your next wellness visit will be due in one year (10/12/2024). The screening/preventive services that you may require over the next 5-10 years are detailed below. Some tests may not apply to you based off risk factors and/or age. Screening tests ordered at today's visit but not completed yet may show as past due. Also, please note that scanned in results may not display below. Preventive Screenings:  Service Recommendations Previous Testing/Comments   Colorectal Cancer Screening  Colonoscopy    Fecal Occult Blood Test (FOBT)/Fecal Immunochemical Test (FIT)  Fecal DNA/Cologuard Test  Flexible Sigmoidoscopy Age: 43-73 years old   Colonoscopy: every 10 years (May be performed more frequently if at higher risk)  OR  FOBT/FIT: every 1 year  OR  Cologuard: every 3 years  OR  Sigmoidoscopy: every 5 years  Screening may be recommended earlier than age 39 if at higher risk for colorectal cancer. Also, an individualized decision between you and your healthcare provider will decide whether screening between the ages of 77-80 would be appropriate.  Colonoscopy: 10/26/2020  FOBT/FIT: Not on file  Cologuard: Not on file  Sigmoidoscopy: Not on file    Screening Current     Prostate Cancer Screening Individualized decision between patient and health care provider in men between ages of 53-66   Medicare will cover every 12 months beginning on the day after your 50th birthday PSA: 7.0 ng/mL     Screening Current     Hepatitis C Screening Once for adults born between 1945 and 1965  More frequently in patients at high risk for Hepatitis C Hep C Antibody: 09/01/2020    Screening Current   Diabetes Screening 1-2 times per year if you're at risk for diabetes or have pre-diabetes Fasting glucose: 111 mg/dL (10/2/2023)  A1C: 5.9 % (3/29/2023)  Screening Current   Cholesterol Screening Once every 5 years if you don't have a lipid disorder. May order more often based on risk factors. Lipid panel: 10/02/2023  Screening Not Indicated  History Lipid Disorder      Other Preventive Screenings Covered by Medicare:  Abdominal Aortic Aneurysm (AAA) Screening: covered once if your at risk. You're considered to be at risk if you have a family history of AAA or a male between the age of 70-76 who smoking at least 100 cigarettes in your lifetime. Lung Cancer Screening: covers low dose CT scan once per year if you meet all of the following conditions: (1) Age 48-67; (2) No signs or symptoms of lung cancer; (3) Current smoker or have quit smoking within the last 15 years; (4) You have a tobacco smoking history of at least 20 pack years (packs per day x number of years you smoked); (5) You get a written order from a healthcare provider. Glaucoma Screening: covered annually if you're considered high risk: (1) You have diabetes OR (2) Family history of glaucoma OR (3)  aged 48 and older OR (3)  American aged 72 and older  Osteoporosis Screening: covered every 2 years if you meet one of the following conditions: (1) Have a vertebral abnormality; (2) On glucocorticoid therapy for more than 3 months; (3) Have primary hyperparathyroidism; (4) On osteoporosis medications and need to assess response to drug therapy. HIV Screening: covered annually if you're between the age of 14-79. Also covered annually if you are younger than 13 and older than 72 with risk factors for HIV infection. For pregnant patients, it is covered up to 3 times per pregnancy.     Immunizations:  Immunization Recommendations   Influenza Vaccine Annual influenza vaccination during flu season is recommended for all persons aged >= 6 months who do not have contraindications   Pneumococcal Vaccine   * Pneumococcal conjugate vaccine = PCV13 (Prevnar 13), PCV15 (Vaxneuvance), PCV20 (Prevnar 20)  * Pneumococcal polysaccharide vaccine = PPSV23 (Pneumovax) Adults 01-10 yo with certain risk factors or if 69+ yo  If never received any pneumonia vaccine: recommend Prevnar 20 (PCV20)  Give PCV20 if previously received 1 dose of PCV13 or PPSV23   Hepatitis B Vaccine 3 dose series if at intermediate or high risk (ex: diabetes, end stage renal disease, liver disease)   Respiratory syncytial virus (RSV) Vaccine - COVERED BY MEDICARE PART D  * RSVPreF3 (Arexvy) CDC recommends that adults 61years of age and older may receive a single dose of RSV vaccine using shared clinical decision-making (SCDM)   Tetanus (Td) Vaccine - COST NOT COVERED BY MEDICARE PART B Following completion of primary series, a booster dose should be given every 10 years to maintain immunity against tetanus. Td may also be given as tetanus wound prophylaxis. Tdap Vaccine - COST NOT COVERED BY MEDICARE PART B Recommended at least once for all adults. For pregnant patients, recommended with each pregnancy. Shingles Vaccine (Shingrix) - COST NOT COVERED BY MEDICARE PART B  2 shot series recommended in those 19 years and older who have or will have weakened immune systems or those 50 years and older     Health Maintenance Due:      Topic Date Due    Colorectal Cancer Screening  10/26/2023    Hepatitis C Screening  Completed     Immunizations Due:      Topic Date Due    COVID-19 Vaccine (4 - Moderna series) 02/09/2022    Influenza Vaccine (1) 09/01/2023     Advance Directives   What are advance directives? Advance directives are legal documents that state your wishes and plans for medical care. These plans are made ahead of time in case you lose your ability to make decisions for yourself. Advance directives can apply to any medical decision, such as the treatments you want, and if you want to donate organs. What are the types of advance directives? There are many types of advance directives, and each state has rules about how to use them.  You may choose a combination of any of the following:  Living will: This is a written record of the treatment you want. You can also choose which treatments you do not want, which to limit, and which to stop at a certain time. This includes surgery, medicine, IV fluid, and tube feedings. Durable power of  for healthcare [facility]): This is a written record that states who you want to make healthcare choices for you when you are unable to make them for yourself. This person, called a proxy, is usually a family member or a friend. You may choose more than 1 proxy. Do not resuscitate (DNR) order:  A DNR order is used in case your heart stops beating or you stop breathing. It is a request not to have certain forms of treatment, such as CPR. A DNR order may be included in other types of advance directives. Medical directive: This covers the care that you want if you are in a coma, near death, or unable to make decisions for yourself. You can list the treatments you want for each condition. Treatment may include pain medicine, surgery, blood transfusions, dialysis, IV or tube feedings, and a ventilator (breathing machine). Values history: This document has questions about your views, beliefs, and how you feel and think about life. This information can help others choose the care that you would choose. Why are advance directives important? An advance directive helps you control your care. Although spoken wishes may be used, it is better to have your wishes written down. Spoken wishes can be misunderstood, or not followed. Treatments may be given even if you do not want them. An advance directive may make it easier for your family to make difficult choices about your care. © Copyright [employer] 2018 Information is for End User's use only and may not be sold, redistributed or otherwise used for commercial purposes.  All illustrations and images included in CareNotes® are the copyrighted property of [employer]., Inc. or  [employer]

## 2023-10-12 NOTE — ASSESSMENT & PLAN NOTE
He can continue pantoprazole for now and refill was sent. Discussed trying to cut down on this medication or only using it every other day or on demand. Also discussed potential use of Tums or famotidine (Pepcid).

## 2023-10-12 NOTE — ASSESSMENT & PLAN NOTE
The patient's blood pressure is well controlled in the office today at 126/70 mmHg. He walks on a regular basis. He should continue current diet and continue taking losartan-hydrochlorothiazide daily.

## 2023-10-24 ENCOUNTER — TELEPHONE (OUTPATIENT)
Age: 73
End: 2023-10-24

## 2023-10-24 ENCOUNTER — HOSPITAL ENCOUNTER (OUTPATIENT)
Dept: NON INVASIVE DIAGNOSTICS | Facility: CLINIC | Age: 73
Discharge: HOME | End: 2023-10-24
Payer: COMMERCIAL

## 2023-10-24 VITALS
SYSTOLIC BLOOD PRESSURE: 174 MMHG | WEIGHT: 177 LBS | DIASTOLIC BLOOD PRESSURE: 92 MMHG | BODY MASS INDEX: 23.46 KG/M2 | HEART RATE: 52 BPM | HEIGHT: 73 IN | OXYGEN SATURATION: 97 %

## 2023-10-24 VITALS
SYSTOLIC BLOOD PRESSURE: 126 MMHG | DIASTOLIC BLOOD PRESSURE: 70 MMHG | WEIGHT: 177 LBS | BODY MASS INDEX: 23.46 KG/M2 | HEART RATE: 80 BPM | HEIGHT: 73 IN

## 2023-10-24 DIAGNOSIS — R06.09 DYSPNEA ON EXERTION: ICD-10-CM

## 2023-10-24 LAB
AORTIC ROOT: 3.1 CM
APICAL FOUR CHAMBER EJECTION FRACTION: 64 %
ASCENDING AORTA: 3.3 CM
CHEST PAIN STATEMENT: NORMAL
E WAVE DECELERATION TIME: 185 MS
E/A RATIO: 0.78
FRACTIONAL SHORTENING: 42 (ref 28–44)
INTERVENTRICULAR SEPTUM IN DIASTOLE (PARASTERNAL SHORT AXIS VIEW): 0.7 CM
INTERVENTRICULAR SEPTUM: 0.7 CM (ref 0.6–1.1)
LAAS-AP2: 17.5 CM2
LAAS-AP4: 21.5 CM2
LEFT ATRIUM SIZE: 4.8 CM
LEFT ATRIUM VOLUME (MOD BIPLANE): 60 ML
LEFT ATRIUM VOLUME INDEX (MOD BIPLANE): 29.3 ML/M2
LEFT INTERNAL DIMENSION IN SYSTOLE: 2.6 CM (ref 2.1–4)
LEFT VENTRICULAR INTERNAL DIMENSION IN DIASTOLE: 4.5 CM (ref 3.5–6)
LEFT VENTRICULAR POSTERIOR WALL IN END DIASTOLE: 0.8 CM
LEFT VENTRICULAR STROKE VOLUME: 68 ML
LVSV (TEICH): 68 ML
MAX DIASTOLIC BP: 84 MMHG
MAX HEART RATE: 137 BPM
MAX HR PERCENT: 91 %
MAX HR: 136 BPM
MAX PREDICTED HEART RATE: 149 BPM
MAX. SYSTOLIC BP: 234 MMHG
MV E'TISSUE VEL-SEP: 8 CM/S
MV PEAK A VEL: 0.85 M/S
MV PEAK E VEL: 66 CM/S
MV STENOSIS PRESSURE HALF TIME: 54 MS
MV VALVE AREA P 1/2 METHOD: 4.07
NUC STRESS DIASTOLIC VOLUME INDEX: 84 ML/M2
NUC STRESS EJECTION FRACTION: 63 %
NUC STRESS SYSTOLIC VOLUME INDEX: 31 ML/M2
PROTOCOL NAME: NORMAL
RATE PRESSURE PRODUCT: NORMAL
RIGHT ATRIUM AREA SYSTOLE A4C: 16.3 CM2
RIGHT VENTRICLE ID DIMENSION: 2.9 CM
SL CV LEFT ATRIUM LENGTH A2C: 5 CM
SL CV LV EF: 65
SL CV PED ECHO LEFT VENTRICLE DIASTOLIC VOLUME (MOD BIPLANE) 2D: 92 ML
SL CV PED ECHO LEFT VENTRICLE SYSTOLIC VOLUME (MOD BIPLANE) 2D: 24 ML
SL CV REST NUCLEAR ISOTOPE DOSE: 10.73 MCI
SL CV STRESS NUCLEAR ISOTOPE DOSE: 32.2 MCI
SL CV STRESS RECOVERY BP: NORMAL MMHG
SL CV STRESS RECOVERY HR: 67 BPM
SL CV STRESS RECOVERY O2 SAT: 96 %
SL CV STRESS STAGE REACHED: 2
STRESS ANGINA INDEX: 0
STRESS BASELINE BP: NORMAL MMHG
STRESS BASELINE HR: 52 BPM
STRESS O2 SAT REST: 97 %
STRESS PEAK HR: 136 BPM
STRESS POST ESTIMATED WORKLOAD: 7 METS
STRESS POST EXERCISE DUR MIN: 6 MIN
STRESS POST O2 SAT PEAK: 95 %
STRESS POST PEAK BP: 234 MMHG
STRESS/REST PERFUSION RATIO: 0.99
TARGET HR FORMULA: NORMAL
TEST INDICATION: NORMAL
TIME IN EXERCISE PHASE: NORMAL
TR MAX PG: 27 MMHG
TR PEAK VELOCITY: 2.6 M/S
TRICUSPID ANNULAR PLANE SYSTOLIC EXCURSION: 2.3 CM
TRICUSPID VALVE PEAK REGURGITATION VELOCITY: 2.61 M/S

## 2023-10-24 PROCEDURE — 93017 CV STRESS TEST TRACING ONLY: CPT

## 2023-10-24 PROCEDURE — 93018 CV STRESS TEST I&R ONLY: CPT | Performed by: INTERNAL MEDICINE

## 2023-10-24 PROCEDURE — G1004 CDSM NDSC: HCPCS

## 2023-10-24 PROCEDURE — 78452 HT MUSCLE IMAGE SPECT MULT: CPT

## 2023-10-24 PROCEDURE — A9502 TC99M TETROFOSMIN: HCPCS

## 2023-10-24 PROCEDURE — 93306 TTE W/DOPPLER COMPLETE: CPT | Performed by: INTERNAL MEDICINE

## 2023-10-24 PROCEDURE — 78452 HT MUSCLE IMAGE SPECT MULT: CPT | Performed by: INTERNAL MEDICINE

## 2023-10-24 PROCEDURE — 93016 CV STRESS TEST SUPVJ ONLY: CPT | Performed by: INTERNAL MEDICINE

## 2023-10-24 PROCEDURE — 93306 TTE W/DOPPLER COMPLETE: CPT

## 2023-10-24 NOTE — TELEPHONE ENCOUNTER
Having a colonscopy done on 10/30. Dr Ariella Mayo ordered caridac testing done today (10/24). Royal Reeder at Dr Casi Pike office would like a call back as soon as we get the results so  knows if he can proceed with the colonoscopy. Thanks [phone].

## 2023-10-24 NOTE — TELEPHONE ENCOUNTER
Spoke with Wallace Taylor at Dr. Amauri Marie office . Waiting to get update on patient's Cardio test performed on 10/24/23, ordered by Dr. Dontrell Ward. Patient is scheduled for colonoscopy on 10/30/23. Need to know if approved to continue  with procedure.

## 2023-10-25 ENCOUNTER — TELEPHONE (OUTPATIENT)
Age: 73
End: 2023-10-25

## 2023-10-25 NOTE — TELEPHONE ENCOUNTER
Spoke with Alma Delia Park from Dr. Lyndsay Becker office she called to let office know that patient, Griselda Ricks, was okayed to have his colonoscopy.

## 2023-10-25 NOTE — TELEPHONE ENCOUNTER
Called iam nath for patient confirming colonoscopy on 10/30/23. Reminded patient all liquid diet all day Sunday, ntil after procedure. Patient needs a ride to and from hospital. Hospital will call between 2-6 with arrival time on Friday. Any questions, please call office.

## 2023-10-30 ENCOUNTER — HOSPITAL ENCOUNTER (OUTPATIENT)
Dept: GASTROENTEROLOGY | Facility: HOSPITAL | Age: 73
Setting detail: OUTPATIENT SURGERY
Discharge: HOME | End: 2023-10-30
Attending: INTERNAL MEDICINE | Admitting: INTERNAL MEDICINE
Payer: COMMERCIAL

## 2023-10-30 ENCOUNTER — ANESTHESIA EVENT (OUTPATIENT)
Dept: GASTROENTEROLOGY | Facility: HOSPITAL | Age: 73
End: 2023-10-30

## 2023-10-30 ENCOUNTER — ANESTHESIA (OUTPATIENT)
Dept: GASTROENTEROLOGY | Facility: HOSPITAL | Age: 73
End: 2023-10-30

## 2023-10-30 VITALS
HEIGHT: 73 IN | RESPIRATION RATE: 16 BRPM | OXYGEN SATURATION: 99 % | DIASTOLIC BLOOD PRESSURE: 59 MMHG | SYSTOLIC BLOOD PRESSURE: 112 MMHG | BODY MASS INDEX: 22.41 KG/M2 | WEIGHT: 169.09 LBS | HEART RATE: 64 BPM | TEMPERATURE: 98.1 F

## 2023-10-30 DIAGNOSIS — Z86.010 HISTORY OF COLON POLYPS: ICD-10-CM

## 2023-10-30 PROCEDURE — 88305 TISSUE EXAM BY PATHOLOGIST: CPT | Performed by: SPECIALIST

## 2023-10-30 RX ORDER — LIDOCAINE HYDROCHLORIDE 20 MG/ML
INJECTION, SOLUTION EPIDURAL; INFILTRATION; INTRACAUDAL; PERINEURAL AS NEEDED
Status: DISCONTINUED | OUTPATIENT
Start: 2023-10-30 | End: 2023-10-30

## 2023-10-30 RX ORDER — ONDANSETRON 2 MG/ML
4 INJECTION INTRAMUSCULAR; INTRAVENOUS ONCE AS NEEDED
Status: CANCELLED | OUTPATIENT
Start: 2023-10-30

## 2023-10-30 RX ORDER — SODIUM CHLORIDE, SODIUM LACTATE, POTASSIUM CHLORIDE, CALCIUM CHLORIDE 600; 310; 30; 20 MG/100ML; MG/100ML; MG/100ML; MG/100ML
INJECTION, SOLUTION INTRAVENOUS CONTINUOUS PRN
Status: DISCONTINUED | OUTPATIENT
Start: 2023-10-30 | End: 2023-10-30

## 2023-10-30 RX ORDER — PROPOFOL 10 MG/ML
INJECTION, EMULSION INTRAVENOUS AS NEEDED
Status: DISCONTINUED | OUTPATIENT
Start: 2023-10-30 | End: 2023-10-30

## 2023-10-30 RX ADMIN — PROPOFOL 50 MG: 10 INJECTION, EMULSION INTRAVENOUS at 11:01

## 2023-10-30 RX ADMIN — PROPOFOL 50 MG: 10 INJECTION, EMULSION INTRAVENOUS at 11:04

## 2023-10-30 RX ADMIN — PROPOFOL 50 MG: 10 INJECTION, EMULSION INTRAVENOUS at 11:11

## 2023-10-30 RX ADMIN — SODIUM CHLORIDE, SODIUM LACTATE, POTASSIUM CHLORIDE, AND CALCIUM CHLORIDE: .6; .31; .03; .02 INJECTION, SOLUTION INTRAVENOUS at 10:53

## 2023-10-30 RX ADMIN — LIDOCAINE HYDROCHLORIDE 100 MG: 20 INJECTION, SOLUTION EPIDURAL; INFILTRATION; INTRACAUDAL; PERINEURAL at 11:00

## 2023-10-30 RX ADMIN — PROPOFOL 50 MG: 10 INJECTION, EMULSION INTRAVENOUS at 11:08

## 2023-10-30 RX ADMIN — PROPOFOL 100 MG: 10 INJECTION, EMULSION INTRAVENOUS at 11:00

## 2023-10-30 NOTE — H&P
History and Physical - [facility]  Serenity Gonzalez 71 y.o. male MRN: [id number]                  HPI: Serenity Gonzalez is a 71y.o. year old male who presents for colonoscopy for history of colon polyps. Last colonoscopy 3 years ago      REVIEW OF SYSTEMS: Per the HPI, and otherwise unremarkable. Historical Information   Past Medical History:   Diagnosis Date    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    Colon polyp     Hemorrhoids     Hypertension     Inflamed seborrheic keratosis     Multiple benign polyps of large intestine     History of personal colonic polyps    Neoplasm of uncertain behavior of skin     Sebaceous cyst      Past Surgical History:   Procedure Laterality Date    COLONOSCOPY      HAND SURGERY Left     thumb ligament repair    INGUINAL HERNIA REPAIR      KNEE ARTHROSCOPY Left     TONSILLECTOMY       Social History   Social History     Substance and Sexual Activity   Alcohol Use Not Currently    Alcohol/week: 0.0 standard drinks of alcohol     Social History     Substance and Sexual Activity   Drug Use No     Social History     Tobacco Use   Smoking Status Former    Packs/day: 0.50    Years: 5.00    Total pack years: 2.50    Types: Cigarettes    Quit date:     Years since quittin.8   Smokeless Tobacco Never     Family History   Problem Relation Age of Onset    Stroke Mother         stroke syndrome    Other Father         hematologic disorder       Meds/Allergies     (Not in a hospital admission)      No Known Allergies    Objective     Blood pressure 141/77, pulse 64, temperature 97.9 °F (36.6 °C), temperature source Temporal, resp. rate 16, height 6' 1" (1.854 m), weight 76.7 kg (169 lb 1.5 oz), SpO2 96 %.       PHYSICAL EXAM    Gen: NAD  CV: RRR  CHEST: Clear  ABD: soft, NT/ND  EXT: no edema  Neuro: AAO      ASSESSMENT/PLAN:  This is a 71y.o. year old male here for history of colon polyps    PLAN:   Procedure: Colonoscopy

## 2023-10-30 NOTE — ANESTHESIA POSTPROCEDURE EVALUATION
Post-Op Assessment Note    CV Status:  Stable  Pain Score: 0    Pain management: adequate     Mental Status:  Sleepy   Hydration Status:  Euvolemic   PONV Controlled:  Controlled   Airway Patency:  Patent      Post Op Vitals Reviewed: Yes      Staff: CRNA         No notable events documented.     BP   113/64   Temp   36   Pulse  80   Resp   14   SpO2   99

## 2023-11-02 PROCEDURE — 88305 TISSUE EXAM BY PATHOLOGIST: CPT | Performed by: SPECIALIST

## 2024-02-16 DIAGNOSIS — E78.2 MIXED HYPERLIPIDEMIA: ICD-10-CM

## 2024-02-16 DIAGNOSIS — R12 HEARTBURN: ICD-10-CM

## 2024-02-16 DIAGNOSIS — I10 PRIMARY HYPERTENSION: Chronic | ICD-10-CM

## 2024-02-16 RX ORDER — LOSARTAN POTASSIUM AND HYDROCHLOROTHIAZIDE 12.5; 5 MG/1; MG/1
1 TABLET ORAL DAILY
Qty: 90 TABLET | Refills: 0 | Status: SHIPPED | OUTPATIENT
Start: 2024-02-16 | End: 2024-08-14

## 2024-02-16 RX ORDER — ROSUVASTATIN CALCIUM 5 MG/1
5 TABLET, COATED ORAL DAILY
Qty: 90 TABLET | Refills: 0 | Status: SHIPPED | OUTPATIENT
Start: 2024-02-16 | End: 2024-08-14

## 2024-02-16 RX ORDER — PANTOPRAZOLE SODIUM 40 MG/1
40 TABLET, DELAYED RELEASE ORAL
Qty: 90 TABLET | Refills: 0 | Status: SHIPPED | OUTPATIENT
Start: 2024-02-16 | End: 2024-08-14

## 2024-04-11 ENCOUNTER — APPOINTMENT (OUTPATIENT)
Dept: LAB | Facility: HOSPITAL | Age: 74
End: 2024-04-11
Payer: COMMERCIAL

## 2024-04-11 DIAGNOSIS — R73.03 PREDIABETES: ICD-10-CM

## 2024-04-11 DIAGNOSIS — E78.2 MIXED HYPERLIPIDEMIA: ICD-10-CM

## 2024-04-11 DIAGNOSIS — R97.20 ELEVATED PROSTATE SPECIFIC ANTIGEN (PSA): ICD-10-CM

## 2024-04-11 DIAGNOSIS — I10 PRIMARY HYPERTENSION: Chronic | ICD-10-CM

## 2024-04-11 LAB
ANION GAP SERPL CALCULATED.3IONS-SCNC: 6 MMOL/L (ref 4–13)
BUN SERPL-MCNC: 21 MG/DL (ref 5–25)
CALCIUM SERPL-MCNC: 9.6 MG/DL (ref 8.4–10.2)
CHLORIDE SERPL-SCNC: 102 MMOL/L (ref 96–108)
CHOLEST SERPL-MCNC: 145 MG/DL
CO2 SERPL-SCNC: 32 MMOL/L (ref 21–32)
CREAT SERPL-MCNC: 1.17 MG/DL (ref 0.6–1.3)
ERYTHROCYTE [DISTWIDTH] IN BLOOD BY AUTOMATED COUNT: 12.5 % (ref 11.6–15.1)
EST. AVERAGE GLUCOSE BLD GHB EST-MCNC: 128 MG/DL
GFR SERPL CREATININE-BSD FRML MDRD: 61 ML/MIN/1.73SQ M
GLUCOSE P FAST SERPL-MCNC: 107 MG/DL (ref 65–99)
HBA1C MFR BLD: 6.1 %
HCT VFR BLD AUTO: 47.7 % (ref 36.5–49.3)
HDLC SERPL-MCNC: 45 MG/DL
HGB BLD-MCNC: 16.2 G/DL (ref 12–17)
LDLC SERPL CALC-MCNC: 83 MG/DL (ref 0–100)
MCH RBC QN AUTO: 30.1 PG (ref 26.8–34.3)
MCHC RBC AUTO-ENTMCNC: 34 G/DL (ref 31.4–37.4)
MCV RBC AUTO: 89 FL (ref 82–98)
PLATELET # BLD AUTO: 245 THOUSANDS/UL (ref 149–390)
PMV BLD AUTO: 8.6 FL (ref 8.9–12.7)
POTASSIUM SERPL-SCNC: 4.1 MMOL/L (ref 3.5–5.3)
PSA SERPL-MCNC: 9.12 NG/ML (ref 0–4)
RBC # BLD AUTO: 5.38 MILLION/UL (ref 3.88–5.62)
SODIUM SERPL-SCNC: 140 MMOL/L (ref 135–147)
TRIGL SERPL-MCNC: 84 MG/DL
WBC # BLD AUTO: 5.84 THOUSAND/UL (ref 4.31–10.16)

## 2024-04-11 PROCEDURE — 85027 COMPLETE CBC AUTOMATED: CPT

## 2024-04-11 PROCEDURE — G0103 PSA SCREENING: HCPCS

## 2024-04-11 PROCEDURE — 36415 COLL VENOUS BLD VENIPUNCTURE: CPT

## 2024-04-11 PROCEDURE — 80048 BASIC METABOLIC PNL TOTAL CA: CPT

## 2024-04-11 PROCEDURE — 83036 HEMOGLOBIN GLYCOSYLATED A1C: CPT

## 2024-04-11 PROCEDURE — 80061 LIPID PANEL: CPT

## 2024-04-16 ENCOUNTER — EVALUATION (OUTPATIENT)
Dept: PHYSICAL THERAPY | Facility: CLINIC | Age: 74
End: 2024-04-16
Payer: COMMERCIAL

## 2024-04-16 DIAGNOSIS — M25.562 CHRONIC PAIN OF BOTH KNEES: Primary | ICD-10-CM

## 2024-04-16 DIAGNOSIS — M17.0 BILATERAL PRIMARY OSTEOARTHRITIS OF KNEE: ICD-10-CM

## 2024-04-16 DIAGNOSIS — M25.561 CHRONIC PAIN OF BOTH KNEES: Primary | ICD-10-CM

## 2024-04-16 DIAGNOSIS — G89.29 CHRONIC PAIN OF BOTH KNEES: Primary | ICD-10-CM

## 2024-04-16 PROCEDURE — 97110 THERAPEUTIC EXERCISES: CPT

## 2024-04-16 PROCEDURE — 97162 PT EVAL MOD COMPLEX 30 MIN: CPT

## 2024-04-16 NOTE — LETTER
2024    Quang Ybarra  [address] [facility] [address]    Patient: Estuardo Ray   YOB: 1950   Date of Visit: 2024     Encounter Diagnosis     ICD-10-CM    1. Chronic pain of both knees  M25.561     M25.562     G89.29       2. Bilateral primary osteoarthritis of knee  M17.0           Dear Dr. Ybarra:    Thank you for your recent referral of Estuardo Ray. Please review the attached evaluation summary from Estuardo's recent visit.     Please verify that you agree with the plan of care by signing the attached order.     If you have any questions or concerns, please do not hesitate to call.     I sincerely appreciate the opportunity to share in the care of one of your patients and hope to have another opportunity to work with you in the near future.       Sincerely,    Jaclyn Siegel, PT      Referring Provider:      I certify that I have read the below Plan of Care and certify the need for these services furnished under this plan of treatment while under my care.                    Quang Ybarra  [address] [facility] [address]  Via Fax: [phone]          PT Evaluation     Today's date: 2024  Patient name: Estuardo Ray  : 1950  MRN: [id number]  Referring provider: Quang Ybarra  Dx:   Encounter Diagnosis     ICD-10-CM    1. Chronic pain of both knees  M25.561     M25.562     G89.29       2. Bilateral primary osteoarthritis of knee  M17.0           Start Time: 0930  Stop Time: 1010  Total time in clinic (min): 40 minutes    Assessment  Assessment details: Patient is a 73 y.o. male who presents to physical therapy with c/o b/l knee pain consistent with osteoarthritis. Patient presents to evaluation with pain, decreased range of motion, decreased strength, and decreased tolerance to activity. I discussed risks, benefits, and alternatives to treatment, and answered all patient questions to patient satisfaction. Patient  presents with baseline FOTO score of 77 indicating limited tolerance/ability to complete ADLs. Patient is an appropriate candidate for skilled PT and would benefit from skilled PT services to address the aforementioned impairments, achieve goals, maximize function, and improve quality of life. Pt is in agreement with this plan.    Patient Education: activity modifications as needed, pacing of activities, importance of HEP compliance, PT prognosis/POC    Impairments: activity intolerance, impaired balance, impaired physical strength and pain with function    Goals  ST weeks  Pt will demonstrate good understanding and compliance with HEP  Pt will decrease pain to 3/10       LT weeks  Pt will improve FOTO score to > or = to 84 to indicate improved functional abilities   Pt will decrease pain to 1-2/10   Pt will increase knee strength to 5/5 for improved tolerance/independence with ADLs  Pt will increase hip strength to 5/5 for improved tolerance/independence with ADLs  Pt will be able to tolerate standing for longer periods without irritation of knee symptoms.      Plan  Patient would benefit from: PT eval and skilled physical therapy  Planned modality interventions: cryotherapy, TENS and thermotherapy: hydrocollator packs  Planned therapy interventions: manual therapy, balance, graded exercise, graded motor, home exercise program, graded activity, flexibility, functional ROM exercises, stretching, strengthening, therapeutic activities, therapeutic exercise and therapeutic training  Frequency: 2x week  Plan of Care beginning date: 2024  Plan of Care expiration date: 2024        Subjective Evaluation    History of Present Illness  Mechanism of injury: Pt is a 72 y/o male who presents to therapy with c/o of b/l knee pain. 2 weeks ago he had PRP injections. The left knee pain is worse than the right. He notes he had recent XRAYs that showed his leg were bowing. He had the left knee done and is going to  "have the right knee done in 2 weeks. He notes he was having difficulty standing for longer periods, morning pain, and other ADLs. He wants to do therapy to help avoid knee replacements.   Pain  Current pain ratin  At best pain ratin  At worst pain ratin          Objective     General Comments:      Knee Comments  L AROM:0-125     R AROM: 0-125    L strength:   4+/5    R strength:   4+/5     SLS:   L- 5 seconds moderate hip/ankle strategy  R- 9 seconds moderate hip/ankle strategy      L hip strength:  Flex- 4/5 abd- 4/5     R hip strength:  Flex- 4+/5 abd- 4+/5                  Diagnosis: b/l knee pain    Precautions: hx L meniscus surgery     POC Expires: 24    Re-evaluation Date: 24   FOTO Scores/Date: Goal -84; : 77   Visit Count 1/10       Manuals                                Ther Ex        SLR 2# 2x10       S/l abd 2# 2x10        Bridge  2x10x5\"                                                        Neuro Re-Ed                                                               Ther Act                                                                                 Modalities                                                            "

## 2024-04-17 ENCOUNTER — TELEPHONE (OUTPATIENT)
Dept: ADMINISTRATIVE | Facility: OTHER | Age: 74
End: 2024-04-17

## 2024-04-17 NOTE — TELEPHONE ENCOUNTER
04/17/24 1:42 PM    Patient contacted (left message) to bring Advance Directive, POLST, or Living Will document to next scheduled pcp visit.    Thank you.  Mehnaz Ramirez  [facility]

## 2024-04-17 NOTE — PROGRESS NOTES
PT Evaluation     Today's date: 2024  Patient name: Estuardo Ray  : 1950  MRN: [id number]  Referring provider: Quang Ybarra  Dx:   Encounter Diagnosis     ICD-10-CM    1. Chronic pain of both knees  M25.561     M25.562     G89.29       2. Bilateral primary osteoarthritis of knee  M17.0           Start Time: 0930  Stop Time: 1010  Total time in clinic (min): 40 minutes    Assessment  Assessment details: Patient is a 73 y.o. male who presents to physical therapy with c/o b/l knee pain consistent with osteoarthritis. Patient presents to evaluation with pain, decreased range of motion, decreased strength, and decreased tolerance to activity. I discussed risks, benefits, and alternatives to treatment, and answered all patient questions to patient satisfaction. Patient presents with baseline FOTO score of 77 indicating limited tolerance/ability to complete ADLs. Patient is an appropriate candidate for skilled PT and would benefit from skilled PT services to address the aforementioned impairments, achieve goals, maximize function, and improve quality of life. Pt is in agreement with this plan.    Patient Education: activity modifications as needed, pacing of activities, importance of HEP compliance, PT prognosis/POC    Impairments: activity intolerance, impaired balance, impaired physical strength and pain with function    Goals  ST weeks  Pt will demonstrate good understanding and compliance with HEP  Pt will decrease pain to 3/10       LT weeks  Pt will improve FOTO score to > or = to 84 to indicate improved functional abilities   Pt will decrease pain to 1-2/10   Pt will increase knee strength to 5/5 for improved tolerance/independence with ADLs  Pt will increase hip strength to 5/5 for improved tolerance/independence with ADLs  Pt will be able to tolerate standing for longer periods without irritation of knee symptoms.      Plan  Patient would benefit from: PT eval and skilled physical  "therapy  Planned modality interventions: cryotherapy, TENS and thermotherapy: hydrocollator packs  Planned therapy interventions: manual therapy, balance, graded exercise, graded motor, home exercise program, graded activity, flexibility, functional ROM exercises, stretching, strengthening, therapeutic activities, therapeutic exercise and therapeutic training  Frequency: 2x week  Plan of Care beginning date: 2024  Plan of Care expiration date: 2024        Subjective Evaluation    History of Present Illness  Mechanism of injury: Pt is a 74 y/o male who presents to therapy with c/o of b/l knee pain. 2 weeks ago he had PRP injections. The left knee pain is worse than the right. He notes he had recent XRAYs that showed his leg were bowing. He had the left knee done and is going to have the right knee done in 2 weeks. He notes he was having difficulty standing for longer periods, morning pain, and other ADLs. He wants to do therapy to help avoid knee replacements.   Pain  Current pain ratin  At best pain ratin  At worst pain ratin          Objective     General Comments:      Knee Comments  L AROM:0-125     R AROM: 0-125    L strength:   4+/5    R strength:   4+/5     SLS:   L- 5 seconds moderate hip/ankle strategy  R- 9 seconds moderate hip/ankle strategy      L hip strength:  Flex- 4/5 abd- 4/5     R hip strength:  Flex- 4+/5 abd- 4+/5                  Diagnosis: b/l knee pain    Precautions: hx L meniscus surgery     POC Expires: 24    Re-evaluation Date: 24   FOTO Scores/Date: Goal -84; : 77   Visit Count 1/10       Manuals                                Ther Ex        SLR 2# 2x10       S/l abd 2# 2x10        Bridge  2x10x5\"                                                        Neuro Re-Ed                                                               Ther Act                                                                                 Modalities                      " 18-Apr-2024

## 2024-04-18 ENCOUNTER — OFFICE VISIT (OUTPATIENT)
Dept: PHYSICAL THERAPY | Facility: CLINIC | Age: 74
End: 2024-04-18
Payer: COMMERCIAL

## 2024-04-18 ENCOUNTER — OFFICE VISIT (OUTPATIENT)
Age: 74
End: 2024-04-18
Payer: COMMERCIAL

## 2024-04-18 VITALS
OXYGEN SATURATION: 95 % | HEART RATE: 73 BPM | HEIGHT: 73 IN | SYSTOLIC BLOOD PRESSURE: 122 MMHG | TEMPERATURE: 96.7 F | WEIGHT: 182.4 LBS | DIASTOLIC BLOOD PRESSURE: 76 MMHG | RESPIRATION RATE: 18 BRPM | BODY MASS INDEX: 24.18 KG/M2

## 2024-04-18 DIAGNOSIS — M25.561 CHRONIC PAIN OF BOTH KNEES: Primary | ICD-10-CM

## 2024-04-18 DIAGNOSIS — R97.20 ELEVATED PSA: ICD-10-CM

## 2024-04-18 DIAGNOSIS — R73.03 PREDIABETES: ICD-10-CM

## 2024-04-18 DIAGNOSIS — E78.2 MIXED HYPERLIPIDEMIA: ICD-10-CM

## 2024-04-18 DIAGNOSIS — M25.562 CHRONIC PAIN OF BOTH KNEES: Primary | ICD-10-CM

## 2024-04-18 DIAGNOSIS — G89.29 CHRONIC PAIN OF BOTH KNEES: Primary | ICD-10-CM

## 2024-04-18 DIAGNOSIS — M17.0 BILATERAL PRIMARY OSTEOARTHRITIS OF KNEE: ICD-10-CM

## 2024-04-18 DIAGNOSIS — I10 PRIMARY HYPERTENSION: Primary | Chronic | ICD-10-CM

## 2024-04-18 PROBLEM — N40.1 BENIGN PROSTATIC HYPERPLASIA WITH URINARY HESITANCY: Status: ACTIVE | Noted: 2024-04-17

## 2024-04-18 PROBLEM — R06.09 DYSPNEA ON EXERTION: Status: RESOLVED | Noted: 2023-10-12 | Resolved: 2024-04-18

## 2024-04-18 PROBLEM — N18.31 HYPERTENSIVE KIDNEY DISEASE WITH STAGE 3A CHRONIC KIDNEY DISEASE (HCC): Status: RESOLVED | Noted: 2023-04-04 | Resolved: 2024-04-18

## 2024-04-18 PROBLEM — R39.11 BENIGN PROSTATIC HYPERPLASIA WITH URINARY HESITANCY: Status: ACTIVE | Noted: 2024-04-17

## 2024-04-18 PROBLEM — I12.9 HYPERTENSIVE KIDNEY DISEASE WITH STAGE 3A CHRONIC KIDNEY DISEASE (HCC): Status: RESOLVED | Noted: 2023-04-04 | Resolved: 2024-04-18

## 2024-04-18 PROCEDURE — 97112 NEUROMUSCULAR REEDUCATION: CPT

## 2024-04-18 PROCEDURE — 97110 THERAPEUTIC EXERCISES: CPT

## 2024-04-18 PROCEDURE — 99214 OFFICE O/P EST MOD 30 MIN: CPT | Performed by: INTERNAL MEDICINE

## 2024-04-18 PROCEDURE — 97530 THERAPEUTIC ACTIVITIES: CPT

## 2024-04-18 PROCEDURE — G2211 COMPLEX E/M VISIT ADD ON: HCPCS | Performed by: INTERNAL MEDICINE

## 2024-04-18 RX ORDER — HYDROCODONE BITARTRATE AND ACETAMINOPHEN 5; 325 MG/1; MG/1
1 TABLET ORAL EVERY 6 HOURS PRN
COMMUNITY
Start: 2024-04-05 | End: 2024-04-18

## 2024-04-18 NOTE — PROGRESS NOTES
Assessment/Plan     1. Primary hypertension  Assessment & Plan:  Blood pressure is well controlled. Continue losartan-hctz.      2. Mixed hyperlipidemia  Assessment & Plan:  Stable and controlled. Continue rosuvastatin.    Orders:  -     CBC and differential; Future; Expected date: 09/15/2024  -     Comprehensive metabolic panel; Future; Expected date: 09/15/2024  -     Lipid Panel with Direct LDL reflex; Future; Expected date: 09/15/2024    3. Prediabetes  Assessment & Plan:  Most recent A1c was 6.1 % on 4/11/2024. Watch carbohydrate intake. Exercise 150 minutes/week.      4. Elevated PSA  Assessment & Plan:  Most recent PSA is 9.12. Saw Dr. Whitaker and VICTORINO is benign. Also benign MRI of the prostate. Repeat PSA in 1 year. Has underlying BPH.          Depression Screening and Follow-up Plan: Patient was screened for depression during today's encounter. They screened negative with a PHQ-2 score of 0.          Subjective     History of Present Illness  The patient presents for 6 month follow-up.    The patient received platelet-rich plasma injections in his knee two weeks ago. He experienced pain during the initial two days post-injection, which has since resolved. Currently, he reports no discernible difference in his condition. He walks 1.5 miles per day. Saw Dr. Whitaker who plans to monitor his PSA again in 1 year.      Review of Systems   Constitutional: Negative.  Negative for activity change, appetite change and fatigue.   Respiratory: Negative.  Negative for apnea, cough, chest tightness, shortness of breath and wheezing.    Cardiovascular: Negative.  Negative for chest pain, palpitations and leg swelling.   Gastrointestinal: Negative.  Negative for abdominal distention, abdominal pain, blood in stool, constipation, diarrhea, nausea and vomiting.   Musculoskeletal: Negative.  Negative for arthralgias, back pain, gait problem, joint swelling and myalgias.   Skin:  Negative for rash and wound.   Neurological:   "Negative for dizziness, weakness, light-headedness, numbness and headaches.   Psychiatric/Behavioral:  Negative for behavioral problems, confusion, hallucinations, sleep disturbance and suicidal ideas. The patient is not nervous/anxious.      Objective     /76 (BP Location: Left arm, Patient Position: Sitting, Cuff Size: Large)   Pulse 73   Temp (!) 96.7 °F (35.9 °C) (Tympanic)   Resp 18   Ht 6' 1\" (1.854 m)   Wt 82.7 kg (182 lb 6.4 oz) Comment: with shoe son  SpO2 95%   BMI 24.06 kg/m²     Physical Exam  Constitutional:       General: He is not in acute distress.     Appearance: He is not ill-appearing.   Cardiovascular:      Rate and Rhythm: Normal rate and regular rhythm.      Heart sounds: No murmur heard.  Pulmonary:      Effort: Pulmonary effort is normal. No respiratory distress.      Breath sounds: No wheezing.   Abdominal:      General: Bowel sounds are normal. There is no distension.      Tenderness: There is no abdominal tenderness.   Musculoskeletal:      Right lower leg: No edema.      Left lower leg: No edema.   Neurological:      Mental Status: He is alert.        Ilya Nothstein, DO   "

## 2024-04-18 NOTE — ASSESSMENT & PLAN NOTE
Most recent PSA is 9.12. Saw Dr. Whitaker and VICTORINO is benign. Also benign MRI of the prostate. Repeat PSA in 1 year. Has underlying BPH.

## 2024-04-18 NOTE — PROGRESS NOTES
"Daily Note     Today's date: 2024  Patient name: Estuardo Ray  : 1950  MRN: [id number]  Referring provider: Herbie Chambers DO  Dx:   Encounter Diagnosis     ICD-10-CM    1. Chronic pain of both knees  M25.561     M25.562     G89.29       2. Bilateral primary osteoarthritis of knee  M17.0                      Subjective: pt reports the knees don't feel any worse, about the same.       Objective: See treatment diary below      Assessment: Tolerated treatment well. Patient would benefit from continued PT. Initiated POC as listed below w/ good tolerance. No report of increased pain.       Plan: Continue per plan of care.      Diagnosis: b/l knee pain    Precautions: hx L meniscus surgery     POC Expires: 24    Re-evaluation Date: 24   FOTO Scores/Date: Goal -84; : 77   Visit Count /10 2/10      Manuals                               Ther Ex       SLR 2# 2x10 2# 2x10      S/l abd 2# 2x10  2# 2x10      Bridge  2x10x5\"  2x10x5\"      Rec. bike  L1 6 min      clamshells  Gtb 2x10x3\"                                      Neuro Re-Ed        Side stepping  Gtb 3 laps      TKE        SLS  10\" x5 ea                                      Ther Act             STS  Low mat 2x10      Step ups fwd  6\" x10      Step ups lat  6\" x10                                                  Modalities                                            "

## 2024-04-23 ENCOUNTER — OFFICE VISIT (OUTPATIENT)
Dept: PHYSICAL THERAPY | Facility: CLINIC | Age: 74
End: 2024-04-23
Payer: COMMERCIAL

## 2024-04-23 DIAGNOSIS — M25.561 CHRONIC PAIN OF BOTH KNEES: Primary | ICD-10-CM

## 2024-04-23 DIAGNOSIS — G89.29 CHRONIC PAIN OF BOTH KNEES: Primary | ICD-10-CM

## 2024-04-23 DIAGNOSIS — M17.0 BILATERAL PRIMARY OSTEOARTHRITIS OF KNEE: ICD-10-CM

## 2024-04-23 DIAGNOSIS — M25.562 CHRONIC PAIN OF BOTH KNEES: Primary | ICD-10-CM

## 2024-04-23 PROCEDURE — 97110 THERAPEUTIC EXERCISES: CPT

## 2024-04-23 NOTE — PROGRESS NOTES
"Daily Note     Today's date: 2024  Patient name: Estuardo Ray  : 1950  MRN: [id number]  Referring provider: Herbie Chambers DO  Dx:   Encounter Diagnosis     ICD-10-CM    1. Chronic pain of both knees  M25.561     M25.562     G89.29       2. Bilateral primary osteoarthritis of knee  M17.0           Start Time: 0800          Subjective: pt offers no new complaints.       Objective: See treatment diary below      Assessment: Progressed strengthening this session as tolerated by the patient. Tolerated treatment well. Patient would benefit from continued PT      Plan: Continue per plan of care.      Diagnosis: b/l knee pain    Precautions: hx L meniscus surgery     POC Expires: 24    Re-evaluation Date: 24   FOTO Scores/Date: Goal -84; : 77   Visit Count 10 2/10 3/10     Manuals                              Ther Ex      SLR 2# 2x10 2# 2x10 2# 2x10      S/l abd 2# 2x10  2# 2x10 2# 2x10     Bridge  2x10x5\"  2x10x5\" 2x10x5\" gtb     Rec. bike  L1 6 min L1 6 min      clamshells  Gtb 2x10x3\" Gtb 2x10x3\"     Leg press   45# SL 2x10                             Neuro Re-Ed       Side stepping  Gtb 3 laps Gtb 4 laps      TKE        SLS  10\" x5 ea  10\" 5x ea                                     Ther Act           STS  Low mat 2x10 Low mat 2x10 10#      Step ups fwd  6\" x10 8\" 2x10 ea      Step ups lat  6\" x10 8\" 10x ea                                                 Modalities                                              "

## 2024-04-25 ENCOUNTER — OFFICE VISIT (OUTPATIENT)
Dept: PHYSICAL THERAPY | Facility: CLINIC | Age: 74
End: 2024-04-25
Payer: COMMERCIAL

## 2024-04-25 DIAGNOSIS — M17.0 BILATERAL PRIMARY OSTEOARTHRITIS OF KNEE: ICD-10-CM

## 2024-04-25 DIAGNOSIS — M25.562 CHRONIC PAIN OF BOTH KNEES: Primary | ICD-10-CM

## 2024-04-25 DIAGNOSIS — M25.561 CHRONIC PAIN OF BOTH KNEES: Primary | ICD-10-CM

## 2024-04-25 DIAGNOSIS — G89.29 CHRONIC PAIN OF BOTH KNEES: Primary | ICD-10-CM

## 2024-04-25 PROCEDURE — 97112 NEUROMUSCULAR REEDUCATION: CPT

## 2024-04-25 PROCEDURE — 97530 THERAPEUTIC ACTIVITIES: CPT

## 2024-04-25 PROCEDURE — 97110 THERAPEUTIC EXERCISES: CPT

## 2024-04-30 ENCOUNTER — OFFICE VISIT (OUTPATIENT)
Dept: PHYSICAL THERAPY | Facility: CLINIC | Age: 74
End: 2024-04-30
Payer: COMMERCIAL

## 2024-04-30 DIAGNOSIS — M25.561 CHRONIC PAIN OF BOTH KNEES: Primary | ICD-10-CM

## 2024-04-30 DIAGNOSIS — M25.562 CHRONIC PAIN OF BOTH KNEES: Primary | ICD-10-CM

## 2024-04-30 DIAGNOSIS — G89.29 CHRONIC PAIN OF BOTH KNEES: Primary | ICD-10-CM

## 2024-04-30 DIAGNOSIS — M17.0 BILATERAL PRIMARY OSTEOARTHRITIS OF KNEE: ICD-10-CM

## 2024-04-30 PROCEDURE — 97112 NEUROMUSCULAR REEDUCATION: CPT

## 2024-04-30 NOTE — PROGRESS NOTES
"Daily Note     Today's date: 2024  Patient name: Estuardo Ray  : 1950  MRN: [id number]  Referring provider: Herbie Chambers DO  Dx:   Encounter Diagnosis     ICD-10-CM    1. Chronic pain of both knees  M25.561     M25.562     G89.29       2. Bilateral primary osteoarthritis of knee  M17.0           Start Time: 0805  Stop Time: 0845  Total time in clinic (min): 40 minutes    Subjective: pt offers no new complaints.       Objective: See treatment diary below      Assessment: Cueing provided to avoid compensations with LAQ and SLR. Difficulty with SLS on bilateral lower extremities. Tolerated treatment well. Patient would benefit from continued PT      Plan: Continue per plan of care.      Diagnosis: b/l knee pain    Precautions: hx L meniscus surgery     POC Expires: 24    Re-evaluation Date: 24   FOTO Scores/Date: Goal -84; : 77   Visit Count /10 2/10 3/10 4/10 5/10   Manuals                             Ther Ex    SLR 2# 2x10 2# 2x10 2# 2x10  3# 2x10 3# 2x10   S/l abd 2# 2x10  2# 2x10 2# 2x10 3# 2x10  3# 2x10   Bridge  2x10x5\"  2x10x5\" 2x10x5\" gtb 2x10x5\" gtb  2x10x5\" gtb    Rec. bike  L1 6 min L1 6 min  L2 x 6 min  L2 x 6 min    clamshells  Gtb 2x10x3\" Gtb 2x10x3\" Gtb 2x10x3\"  Gtb 2x10x3\"    Leg press   45# SL 2x10 45# SL 2x10 45# SL 2x10    LAQ     3# 2x10                    Neuro Re-Ed     Side stepping  Gtb 3 laps Gtb 4 laps  Gtb 4 laps     TKE        SLS  10\" x5 ea  10\" 5x ea  10\" 5x ea  10\" 5x                                   Ther Act        STS  Low mat 2x10 Low mat 2x10 10#  Low mat 2x10 10#    Step ups fwd  6\" x10 8\" 2x10 ea  8\" 2x10 ea  8\" 2x10   Step ups lat  6\" x10 8\" 10x ea 8\" 10x  8\" 2x10                                               Modalities                                                  "

## 2024-05-02 ENCOUNTER — OFFICE VISIT (OUTPATIENT)
Dept: PHYSICAL THERAPY | Facility: CLINIC | Age: 74
End: 2024-05-02
Payer: COMMERCIAL

## 2024-05-02 DIAGNOSIS — M25.561 CHRONIC PAIN OF BOTH KNEES: Primary | ICD-10-CM

## 2024-05-02 DIAGNOSIS — M17.0 BILATERAL PRIMARY OSTEOARTHRITIS OF KNEE: ICD-10-CM

## 2024-05-02 DIAGNOSIS — M25.562 CHRONIC PAIN OF BOTH KNEES: Primary | ICD-10-CM

## 2024-05-02 DIAGNOSIS — G89.29 CHRONIC PAIN OF BOTH KNEES: Primary | ICD-10-CM

## 2024-05-02 PROCEDURE — 97110 THERAPEUTIC EXERCISES: CPT

## 2024-05-02 PROCEDURE — 97530 THERAPEUTIC ACTIVITIES: CPT

## 2024-05-02 PROCEDURE — 97112 NEUROMUSCULAR REEDUCATION: CPT

## 2024-05-02 NOTE — PROGRESS NOTES
"Daily Note     Today's date: 2024  Patient name: Estuardo Ray  : 1950  MRN: [id number]  Referring provider: Herbie Chambers DO  Dx:   Encounter Diagnosis     ICD-10-CM    1. Chronic pain of both knees  M25.561     M25.562     G89.29       2. Bilateral primary osteoarthritis of knee  M17.0           Start Time: 0800          Subjective: pt offers no new complaints.       Objective: See treatment diary below      Assessment: Cueing for correct form with SLR and s/l abd provided with good carryover. Increased weight with SL leg press this session with good tolerance. Tolerated treatment well. Patient would benefit from continued PT      Plan: Continue per plan of care.      Diagnosis: b/l knee pain    Precautions: hx L meniscus surgery     POC Expires: 24    Re-evaluation Date: 24   FOTO Scores/Date: Goal -84; : 77   Visit Count 6/10 2/10 3/10 4/10 510   Manuals                             Ther Ex    SLR 3# 2x10 2# 2x10 2# 2x10  3# 2x10 3# 2x10   S/l abd 3# 2x10  2# 2x10 2# 2x10 3# 2x10  3# 2x10   Bridge  2x10x5\" gtb  2x10x5\" 2x10x5\" gtb 2x10x5\" gtb  2x10x5\" gtb    Rec. bike L2 x 6 min  L1 6 min L1 6 min  L2 x 6 min  L2 x 6 min    clamshells Gtb 2x10x3\" Gtb 2x10x3\" Gtb 2x10x3\" Gtb 2x10x3\"  Gtb 2x10x3\"    Leg press SL 55# 2x10   45# SL 2x10 45# SL 2x10 45# SL 2x10    LAQ 3# 2x10     3# 2x10                    Neuro Re-Ed    Side stepping Gtb 4 laps  Gtb 3 laps Gtb 4 laps  Gtb 4 laps     TKE        SLS 10\" 5x  10\" x5 ea  10\" 5x ea  10\" 5x ea  10\" 5x                                  Ther Act     STS Low mat 2x10 10#  Low mat 2x10 Low mat 2x10 10#  Low mat 2x10 10#    Step ups fwd 8\" 2x10 6\" x10 8\" 2x10 ea  8\" 2x10 ea  8\" 2x10   Step ups lat 8\" 2x10  6\" x10 8\" 10x ea 8\" 10x  8\" 2x10                                             Modalities                                                   "

## 2024-05-07 ENCOUNTER — OFFICE VISIT (OUTPATIENT)
Dept: PHYSICAL THERAPY | Facility: CLINIC | Age: 74
End: 2024-05-07
Payer: COMMERCIAL

## 2024-05-07 DIAGNOSIS — M25.562 CHRONIC PAIN OF BOTH KNEES: Primary | ICD-10-CM

## 2024-05-07 DIAGNOSIS — M25.561 CHRONIC PAIN OF BOTH KNEES: Primary | ICD-10-CM

## 2024-05-07 DIAGNOSIS — M17.0 BILATERAL PRIMARY OSTEOARTHRITIS OF KNEE: ICD-10-CM

## 2024-05-07 DIAGNOSIS — G89.29 CHRONIC PAIN OF BOTH KNEES: Primary | ICD-10-CM

## 2024-05-07 PROCEDURE — 97110 THERAPEUTIC EXERCISES: CPT

## 2024-05-07 PROCEDURE — 97530 THERAPEUTIC ACTIVITIES: CPT

## 2024-05-07 PROCEDURE — 97112 NEUROMUSCULAR REEDUCATION: CPT

## 2024-05-07 NOTE — PROGRESS NOTES
"Daily Note     Today's date: 2024  Patient name: Estuardo Ray  : 1950  MRN: [id number]  Referring provider: Herbie Chambers DO  Dx:   Encounter Diagnosis     ICD-10-CM    1. Chronic pain of both knees  M25.561     M25.562     G89.29       2. Bilateral primary osteoarthritis of knee  M17.0                      Subjective: pt offers no new major complaints pertaining to his knees.       Objective: See treatment diary below      Assessment: Tolerated treatment well. Patient would benefit from continued PT. Continued w/ current POC as listed below w/ good tolerance. Progressed resistance w/ hip strengthening w/ good tolerance.       Plan: Continue per plan of care.      Diagnosis: b/l knee pain    Precautions: hx L meniscus surgery     POC Expires: 24    Re-evaluation Date: 24   FOTO Scores/Date: 84; : 77   Visit Count 6/10 7/10   510   Manuals                             Ther Ex    SLR 3# 2x10 3# 2x10   3# 2x10   S/l abd 3# 2x10  3# 2x10   3# 2x10   Bridge  2x10x5\" gtb  Btb 2x10x5\"   2x10x5\" gtb    Rec. bike L2 x 6 min  L2 x 6 min   L2 x 6 min    clamshells Gtb 2x10x3\" Btb 2x10x3\"   Gtb 2x10x3\"    Leg press SL 55# 2x10  SL 55# 2x10   45# SL 2x10    LAQ 3# 2x10  3# 2x10   3# 2x10                    Neuro Re-Ed    Side stepping Gtb 4 laps  Gtb 4 laps      TKE  BTB 10\" x10       SLS 10\" 5x  10\" x5   10\" 5x    Standing hip 3 way  Gtb 2x10                         Ther Act     STS Low mat 2x10 10#  Low mat 2x10 10#      Step ups fwd 8\" 2x10 8\" 2x10   8\" 2x10   Step ups lat 8\" 2x10  8\" 2x10   8\" 2x10                                       Modalities                                                  "

## 2024-05-09 ENCOUNTER — OFFICE VISIT (OUTPATIENT)
Dept: PHYSICAL THERAPY | Facility: CLINIC | Age: 74
End: 2024-05-09
Payer: COMMERCIAL

## 2024-05-09 DIAGNOSIS — M25.561 CHRONIC PAIN OF BOTH KNEES: Primary | ICD-10-CM

## 2024-05-09 DIAGNOSIS — G89.29 CHRONIC PAIN OF BOTH KNEES: Primary | ICD-10-CM

## 2024-05-09 DIAGNOSIS — M17.0 BILATERAL PRIMARY OSTEOARTHRITIS OF KNEE: ICD-10-CM

## 2024-05-09 DIAGNOSIS — M25.562 CHRONIC PAIN OF BOTH KNEES: Primary | ICD-10-CM

## 2024-05-09 PROCEDURE — 97112 NEUROMUSCULAR REEDUCATION: CPT

## 2024-05-09 PROCEDURE — 97530 THERAPEUTIC ACTIVITIES: CPT

## 2024-05-09 PROCEDURE — 97110 THERAPEUTIC EXERCISES: CPT

## 2024-05-09 NOTE — PROGRESS NOTES
"Daily Note     Today's date: 2024  Patient name: Estuardo Ray  : 1950  MRN: [id number]  Referring provider: Herbie Chambers DO  Dx:   Encounter Diagnosis     ICD-10-CM    1. Chronic pain of both knees  M25.561     M25.562     G89.29       2. Bilateral primary osteoarthritis of knee  M17.0           Start Time: 0800  Stop Time: 0900  Total time in clinic (min): 60 minutes    Subjective: pt reports soreness after last session.       Objective: See treatment diary below      Assessment: progressed weight with strengthening with good tolerance. Improved form with SLR and s/l abd with cueing provided. Tolerated treatment well. Patient would benefit from continued PT      Plan: Continue per plan of care.      Diagnosis: b/l knee pain    Precautions: hx L meniscus surgery     POC Expires: 24    Re-evaluation Date: 24   FOTO Scores/Date: Goal -84; : 77   Visit Count 6/10 7/10 8/10  5/10   Manuals                             Ther Ex    SLR 3# 2x10 3# 2x10 3# 2x10  3# 2x10   S/l abd 3# 2x10  3# 2x10 3# 2x10  3# 2x10   Bridge  2x10x5\" gtb  Btb 2x10x5\" Btb 2x10x5\"   2x10x5\" gtb    Rec. bike L2 x 6 min  L2 x 6 min L2 x 6 min   L2 x 6 min    clamshells Gtb 2x10x3\" Btb 2x10x3\" Btb 2x10x3\"  Gtb 2x10x3\"    Leg press SL 55# 2x10  SL 55# 2x10 SL 55# 2x10  45# SL 2x10    LAQ 3# 2x10  3# 2x10 3# 2x10   3# 2x10                    Neuro Re-Ed    Side stepping Gtb 4 laps  Gtb 4 laps Gtb 4 laps      TKE  BTB 10\" x10  BTB 10\" x10      SLS 10\" 5x  10\" x5 10\" 5x   10\" 5x    Standing hip 3 way  Gtb 2x10 Gtb 2x10                         Ther Act     STS Low mat 2x10 10#  Low mat 2x10 10# Low mat 2x10 15#      Step ups fwd 8\" 2x10 8\" 2x10 8\" 2x10  8\" 2x10   Step ups lat 8\" 2x10  8\" 2x10 8\" 2x10   8\" 2x10                                       Modalities                                                    "

## 2024-05-14 ENCOUNTER — EVALUATION (OUTPATIENT)
Dept: PHYSICAL THERAPY | Facility: CLINIC | Age: 74
End: 2024-05-14
Payer: COMMERCIAL

## 2024-05-14 DIAGNOSIS — G89.29 CHRONIC PAIN OF BOTH KNEES: ICD-10-CM

## 2024-05-14 DIAGNOSIS — M25.561 CHRONIC PAIN OF BOTH KNEES: ICD-10-CM

## 2024-05-14 DIAGNOSIS — M17.0 BILATERAL PRIMARY OSTEOARTHRITIS OF KNEE: Primary | ICD-10-CM

## 2024-05-14 DIAGNOSIS — M25.562 CHRONIC PAIN OF BOTH KNEES: ICD-10-CM

## 2024-05-14 PROCEDURE — 97112 NEUROMUSCULAR REEDUCATION: CPT

## 2024-05-14 PROCEDURE — 97110 THERAPEUTIC EXERCISES: CPT

## 2024-05-14 NOTE — PROGRESS NOTES
PT Re-Evaluation     Today's date: 2024  Patient name: Estuardo Ray  : 1950  MRN: [id number]  Referring provider: Herbie Chambers DO  Dx:   Encounter Diagnosis     ICD-10-CM    1. Bilateral primary osteoarthritis of knee  M17.0       2. Chronic pain of both knees  M25.561     M25.562     G89.29             Start Time: 0800  Stop Time: 0845  Total time in clinic (min): 45 minutes    Assessment  Assessment details: Patient is a 72 y/o male who presents to therapy with complaints of b/l knee pain and has completed 9 visits to date with improved FOTO score of 99 since IE. Patient demonstrates subjective/objective improvement since starting PT such as improved strength, stability, and activity tolerance. Patient continues to present with deficits that include stability, knee pain with activity, and hip strength. Patient will benefit from continued skilled PT intervention to address the aforementioned impairments, achieve goals, maximize function, and improve quality of life. Pt is in agreement with this plan.   Impairments: activity intolerance, impaired balance, impaired physical strength and pain with function    Goals  ST weeks  Pt will demonstrate good understanding and compliance with HEP --progressing  Pt will decrease pain to 3/10  --progressing     LT weeks  Pt will improve FOTO score to > or = to 84 to indicate improved functional abilities --met  Pt will decrease pain to 1-2/10 --progressing  Pt will increase knee strength to 5/5 for improved tolerance/independence with ADLs --met   Pt will increase hip strength to 5/5 for improved tolerance/independence with ADLs --progressing  Pt will be able to tolerate standing for longer periods without irritation of knee symptoms. --progressing       Plan  Patient would benefit from: PT eval and skilled physical therapy  Planned modality interventions: cryotherapy, TENS and thermotherapy: hydrocollator packs  Planned therapy interventions:  manual therapy, balance, graded exercise, graded motor, home exercise program, graded activity, flexibility, functional ROM exercises, stretching, strengthening, therapeutic activities, therapeutic exercise and therapeutic training  Frequency: 2x week  Plan of Care beginning date: 2024  Plan of Care expiration date: 2024        Subjective Evaluation    History of Present Illness  Mechanism of injury: RE (24):  Pt is a 74 y/o male who presents tot therapy with b/l knee pain. He is now 6 weeks since the PRP injections in the left knee and is having the right knee done of Friday. He notes some days depending on the weather the knees will bother him. He notes the left knee does feel better than the right now. Prior to injections the left knee was worse. He gets the worst pain with prolonged standing and when the weather is worse. He notes he has not been taking any OTC medications since he had the first round of injections. He was taking advil 1-2x weekly before injections.     IE (24):  Pt is a 74 y/o male who presents to therapy with c/o of b/l knee pain. 2 weeks ago he had PRP injections. The left knee pain is worse than the right. He notes he had recent XRAYs that showed his leg were bowing. He had the left knee done and is going to have the right knee done in 2 weeks. He notes he was having difficulty standing for longer periods, morning pain, and other ADLs. He wants to do therapy to help avoid knee replacements.   Pain  Current pain ratin  At best pain ratin  At worst pain ratin          Objective     General Comments:      Knee Comments  L AROM:0-125     R AROM: 0-125    L strength:   5/5    R strength:   5/5     SLS:   L- 30 seconds minimal ankle strategy  R- 13 seconds minimal hip/ankle strategy      L hip strength:  Flex- 4+/5 abd- 4+/5     R hip strength:  Flex- 5/5 abd- 4+/5                  Diagnosis: b/l knee pain    Precautions: hx L meniscus surgery     POC Expires:  "6/11/24    Re-evaluation Date: 6/11/24   FOTO Scores/Date: Goal -84; 4/16: 77; 5/14-99    Visit Count 6/10 7/10 8/10 1/10 5/10   Manuals 5/2 5/7 5/9 5/14 4/30                            Ther Ex 5/2 5/7 5/9 5/14 4/30   SLR 3# 2x10 3# 2x10 3# 2x10 3# 2x10 3# 2x10   S/l abd 3# 2x10  3# 2x10 3# 2x10 3# 2x10  3# 2x10   Bridge  2x10x5\" gtb  Btb 2x10x5\" Btb 2x10x5\"  BTB 2x10x5\"  2x10x5\" gtb    Rec. bike L2 x 6 min  L2 x 6 min L2 x 6 min  L2 x 6 min  L2 x 6 min    clamshells Gtb 2x10x3\" Btb 2x10x3\" Btb 2x10x3\" Btb 2x10x3\" Gtb 2x10x3\"    Leg press SL 55# 2x10  SL 55# 2x10 SL 55# 2x10  45# SL 2x10    LAQ 3# 2x10  3# 2x10 3# 2x10  3# 2x10  3# 2x10        Updated FOTO/measurements            Neuro Re-Ed 5/2 5/7 5/9 5/14 4/30   Side stepping Gtb 4 laps  Gtb 4 laps Gtb 4 laps  Gtb 4 laps    TKE  BTB 10\" x10  BTB 10\" x10      SLS 10\" 5x  10\" x5 10\" 5x   10\" 5x    Standing hip 3 way  Gtb 2x10 Gtb 2x10  Gtb 2x10                        Ther Act 5/2 5/7 5/9 4/30    STS Low mat 2x10 10#  Low mat 2x10 10# Low mat 2x10 15#      Step ups fwd 8\" 2x10 8\" 2x10 8\" 2x10  8\" 2x10   Step ups lat 8\" 2x10  8\" 2x10 8\" 2x10   8\" 2x10                                       Modalities                                        "

## 2024-05-16 ENCOUNTER — OFFICE VISIT (OUTPATIENT)
Dept: PHYSICAL THERAPY | Facility: CLINIC | Age: 74
End: 2024-05-16
Payer: COMMERCIAL

## 2024-05-16 DIAGNOSIS — G89.29 CHRONIC PAIN OF BOTH KNEES: ICD-10-CM

## 2024-05-16 DIAGNOSIS — M17.0 BILATERAL PRIMARY OSTEOARTHRITIS OF KNEE: Primary | ICD-10-CM

## 2024-05-16 DIAGNOSIS — M25.562 CHRONIC PAIN OF BOTH KNEES: ICD-10-CM

## 2024-05-16 DIAGNOSIS — M25.561 CHRONIC PAIN OF BOTH KNEES: ICD-10-CM

## 2024-05-16 PROCEDURE — 97110 THERAPEUTIC EXERCISES: CPT

## 2024-05-16 PROCEDURE — 97112 NEUROMUSCULAR REEDUCATION: CPT

## 2024-05-16 NOTE — PROGRESS NOTES
"Daily Note     Today's date: 2024  Patient name: Estuardo Ray  : 1950  MRN: [id number]  Referring provider: Herbie Chambers DO  Dx:   Encounter Diagnosis     ICD-10-CM    1. Bilateral primary osteoarthritis of knee  M17.0       2. Chronic pain of both knees  M25.561     M25.562     G89.29                      Subjective: pt reports his knees are doing good. Getting the injections tomorrow.       Objective: See treatment diary below      Assessment: Tolerated treatment well. Patient would benefit from continued PT. Continued w/ current POC as listed below w/ good tolerance. Reviewed HEP.       Plan: Pt will call if MD wants him to continue therapy.      Diagnosis: b/l knee pain    Precautions: hx L meniscus surgery     POC Expires: 24    Re-evaluation Date: 24   FOTO Scores/Date:  -84; : 77; -    Visit Count 6/10 7/10 8/10 1/10 2/10   Manuals                            Ther Ex    SLR 3# 2x10 3# 2x10 3# 2x10 3# 2x10 3# 2x10   S/l abd 3# 2x10  3# 2x10 3# 2x10 3# 2x10  3# 2x10   Bridge  2x10x5\" gtb  Btb 2x10x5\" Btb 2x10x5\"  BTB 2x10x5\"  BTB 2x10x5\"    Rec. bike L2 x 6 min  L2 x 6 min L2 x 6 min  L2 x 6 min  L2 x 6 min   clamshells Gtb 2x10x3\" Btb 2x10x3\" Btb 2x10x3\" Btb 2x10x3\" Btb 2x10x3\"   Leg press SL 55# 2x10  SL 55# 2x10 SL 55# 2x10     LAQ 3# 2x10  3# 2x10 3# 2x10  3# 2x10  3# 2x10       Updated FOTO/measurements            Neuro Re-Ed    Side stepping Gtb 4 laps  Gtb 4 laps Gtb 4 laps  Gtb 4 laps Gtb 4 laps   TKE  BTB 10\" x10  BTB 10\" x10      SLS 10\" 5x  10\" x5 10\" 5x      Standing hip 3 way  Gtb 2x10 Gtb 2x10  Gtb 2x10  Gtb 2x10                     Ther Act    STS Low mat 2x10 10#  Low mat 2x10 10# Low mat 2x10 15#      Step ups fwd 8\" 2x10 8\" 2x10 8\" 2x10     Step ups lat 8\" 2x10  8\" 2x10 8\" 2x10                                         Modalities                             "

## 2024-05-18 DIAGNOSIS — E78.2 MIXED HYPERLIPIDEMIA: ICD-10-CM

## 2024-05-18 DIAGNOSIS — R12 HEARTBURN: ICD-10-CM

## 2024-05-18 DIAGNOSIS — I10 PRIMARY HYPERTENSION: Chronic | ICD-10-CM

## 2024-05-18 RX ORDER — PANTOPRAZOLE SODIUM 40 MG/1
40 TABLET, DELAYED RELEASE ORAL
Qty: 90 TABLET | Refills: 1 | Status: SHIPPED | OUTPATIENT
Start: 2024-05-18

## 2024-05-18 RX ORDER — LOSARTAN POTASSIUM AND HYDROCHLOROTHIAZIDE 12.5; 5 MG/1; MG/1
1 TABLET ORAL DAILY
Qty: 90 TABLET | Refills: 1 | Status: SHIPPED | OUTPATIENT
Start: 2024-05-18

## 2024-05-18 RX ORDER — ROSUVASTATIN CALCIUM 5 MG/1
5 TABLET, COATED ORAL DAILY
Qty: 90 TABLET | Refills: 1 | Status: SHIPPED | OUTPATIENT
Start: 2024-05-18 | End: 2024-11-14

## 2024-09-02 ENCOUNTER — APPOINTMENT (EMERGENCY)
Dept: CT IMAGING | Facility: HOSPITAL | Age: 74
End: 2024-09-02
Payer: COMMERCIAL

## 2024-09-02 ENCOUNTER — HOSPITAL ENCOUNTER (EMERGENCY)
Facility: HOSPITAL | Age: 74
Discharge: HOME | End: 2024-09-03
Attending: EMERGENCY MEDICINE
Payer: COMMERCIAL

## 2024-09-02 DIAGNOSIS — R10.9 ABDOMINAL PAIN: ICD-10-CM

## 2024-09-02 DIAGNOSIS — R33.9 URINARY RETENTION: Primary | ICD-10-CM

## 2024-09-02 LAB
ALBUMIN SERPL BCG-MCNC: 4.4 G/DL (ref 3.5–5)
ALP SERPL-CCNC: 73 U/L (ref 34–104)
ALT SERPL W P-5'-P-CCNC: 12 U/L (ref 7–52)
ANION GAP SERPL CALCULATED.3IONS-SCNC: 8 MMOL/L (ref 4–13)
AST SERPL W P-5'-P-CCNC: 15 U/L (ref 13–39)
BASOPHILS # BLD AUTO: 0.03 THOUSANDS/ÂΜL (ref 0–0.1)
BASOPHILS NFR BLD AUTO: 0 % (ref 0–1)
BILIRUB SERPL-MCNC: 1.25 MG/DL (ref 0.2–1)
BILIRUB UR QL STRIP: NEGATIVE
BUN SERPL-MCNC: 21 MG/DL (ref 5–25)
CALCIUM SERPL-MCNC: 9.5 MG/DL (ref 8.4–10.2)
CHLORIDE SERPL-SCNC: 103 MMOL/L (ref 96–108)
CLARITY UR: CLEAR
CO2 SERPL-SCNC: 28 MMOL/L (ref 21–32)
COLOR UR: NORMAL
CREAT SERPL-MCNC: 1.17 MG/DL (ref 0.6–1.3)
EOSINOPHIL # BLD AUTO: 0.17 THOUSAND/ÂΜL (ref 0–0.61)
EOSINOPHIL NFR BLD AUTO: 2 % (ref 0–6)
ERYTHROCYTE [DISTWIDTH] IN BLOOD BY AUTOMATED COUNT: 12.6 % (ref 11.6–15.1)
GFR SERPL CREATININE-BSD FRML MDRD: 61 ML/MIN/1.73SQ M
GLUCOSE SERPL-MCNC: 136 MG/DL (ref 65–140)
GLUCOSE UR STRIP-MCNC: NEGATIVE MG/DL
HCT VFR BLD AUTO: 47.4 % (ref 36.5–49.3)
HGB BLD-MCNC: 15.9 G/DL (ref 12–17)
HGB UR QL STRIP.AUTO: NEGATIVE
IMM GRANULOCYTES # BLD AUTO: 0.03 THOUSAND/UL (ref 0–0.2)
IMM GRANULOCYTES NFR BLD AUTO: 0 % (ref 0–2)
KETONES UR STRIP-MCNC: NEGATIVE MG/DL
LEUKOCYTE ESTERASE UR QL STRIP: NEGATIVE
LIPASE SERPL-CCNC: 14 U/L (ref 11–82)
LYMPHOCYTES # BLD AUTO: 2.08 THOUSANDS/ÂΜL (ref 0.6–4.47)
LYMPHOCYTES NFR BLD AUTO: 23 % (ref 14–44)
MCH RBC QN AUTO: 29.4 PG (ref 26.8–34.3)
MCHC RBC AUTO-ENTMCNC: 33.5 G/DL (ref 31.4–37.4)
MCV RBC AUTO: 88 FL (ref 82–98)
MONOCYTES # BLD AUTO: 0.66 THOUSAND/ÂΜL (ref 0.17–1.22)
MONOCYTES NFR BLD AUTO: 7 % (ref 4–12)
NEUTROPHILS # BLD AUTO: 6.11 THOUSANDS/ÂΜL (ref 1.85–7.62)
NEUTS SEG NFR BLD AUTO: 68 % (ref 43–75)
NITRITE UR QL STRIP: NEGATIVE
NRBC BLD AUTO-RTO: 0 /100 WBCS
PH UR STRIP.AUTO: 6 [PH]
PLATELET # BLD AUTO: 231 THOUSANDS/UL (ref 149–390)
PMV BLD AUTO: 8.6 FL (ref 8.9–12.7)
POTASSIUM SERPL-SCNC: 3.8 MMOL/L (ref 3.5–5.3)
PROT SERPL-MCNC: 7.5 G/DL (ref 6.4–8.4)
PROT UR STRIP-MCNC: NEGATIVE MG/DL
RBC # BLD AUTO: 5.41 MILLION/UL (ref 3.88–5.62)
SODIUM SERPL-SCNC: 139 MMOL/L (ref 135–147)
SP GR UR STRIP.AUTO: 1.01 (ref 1–1.03)
UROBILINOGEN UR STRIP-ACNC: <2 MG/DL
WBC # BLD AUTO: 9.08 THOUSAND/UL (ref 4.31–10.16)

## 2024-09-02 PROCEDURE — 99285 EMERGENCY DEPT VISIT HI MDM: CPT

## 2024-09-02 PROCEDURE — 81003 URINALYSIS AUTO W/O SCOPE: CPT

## 2024-09-02 PROCEDURE — 99284 EMERGENCY DEPT VISIT MOD MDM: CPT

## 2024-09-02 PROCEDURE — 85025 COMPLETE CBC W/AUTO DIFF WBC: CPT

## 2024-09-02 PROCEDURE — 80053 COMPREHEN METABOLIC PANEL: CPT

## 2024-09-02 PROCEDURE — 36415 COLL VENOUS BLD VENIPUNCTURE: CPT

## 2024-09-02 PROCEDURE — 83690 ASSAY OF LIPASE: CPT

## 2024-09-02 PROCEDURE — 74177 CT ABD & PELVIS W/CONTRAST: CPT

## 2024-09-02 RX ORDER — LIDOCAINE HYDROCHLORIDE 20 MG/ML
1 JELLY TOPICAL ONCE
Status: COMPLETED | OUTPATIENT
Start: 2024-09-02 | End: 2024-09-02

## 2024-09-02 RX ADMIN — LIDOCAINE HYDROCHLORIDE 1 APPLICATION: 20 JELLY TOPICAL at 23:24

## 2024-09-03 ENCOUNTER — TELEPHONE (OUTPATIENT)
Age: 74
End: 2024-09-03

## 2024-09-03 VITALS
OXYGEN SATURATION: 98 % | BODY MASS INDEX: 23.33 KG/M2 | HEART RATE: 70 BPM | SYSTOLIC BLOOD PRESSURE: 175 MMHG | DIASTOLIC BLOOD PRESSURE: 88 MMHG | TEMPERATURE: 97.6 F | WEIGHT: 176.81 LBS | RESPIRATION RATE: 18 BRPM

## 2024-09-03 RX ORDER — TAMSULOSIN HYDROCHLORIDE 0.4 MG/1
0.4 CAPSULE ORAL
Qty: 14 CAPSULE | Refills: 0 | Status: SHIPPED | OUTPATIENT
Start: 2024-09-03

## 2024-09-03 RX ADMIN — IOHEXOL 100 ML: 350 INJECTION, SOLUTION INTRAVENOUS at 00:06

## 2024-09-03 NOTE — TELEPHONE ENCOUNTER
NP- urinary retention     Patient known to [facility] urology was seen at a [facility] for urinary retention. He did have a catheter put in by our hospital. He called his [facility] urology doctors office and they stated they are not able to take this out for patient.     Please review and call patient to further triage and set up apt for catheter removal.     CB: [phone] house     Detailed message can be left on machine.

## 2024-09-03 NOTE — TELEPHONE ENCOUNTER
Patient had 1500 ml in bladder on perez placement. Patients needs a NP donna in 10-14 days per protocol. Please try to schedule AP in the AM with RN in the PM for PVR in that time frame

## 2024-09-03 NOTE — ED PROVIDER NOTES
History  Chief Complaint   Patient presents with    Difficulty Urinating     Patient reports difficultly with urination that began early this morning. Hx of enlarged prostate Patient reports following with a urologist however cannot wait for an appoinment. Juan Carlos reprots small amoun of urnation every 2 hours and c/o pressure and pain in his mid lower abdominal area.      The patient is a 73 y.o. male with a history of arthritis, BPH, GERD, hemorrhoids, hypertension, large intestine benign polyps who presents to [facility] [facility] with a chief complaint of difficulty urinating. Patient reports that he has a history of enlarged prostate and that he does follow with urology. Symptoms began earlier this morning and have been constant since onset. He reports that he has been only able to go a very small amount of urine at a time. His pain is currently rated as a 4/10 in severity and described as suprapubic pressure/discomfort without radiation. Associated symptoms include none noted. Symptoms are aggravated with none noted and alleviating factors include none noted. The patient denies fever, chills, night sweats, chest pain, shortness of breath, dizziness, lightheadedness, falls, trauma, hematuria, dysuria, nausea, vomiting, diarrhea, jaundice. No other reported symptoms at this time.  Patient denies allergies to anything  On bladder scan patient had 700ml a few minutes after trying to void        History provided by:  Patient   used: No    Difficulty Urinating  Presenting symptoms: no dysuria    Associated symptoms: no abdominal pain, no fever, no hematuria and no vomiting        Prior to Admission Medications   Prescriptions Last Dose Informant Patient Reported? Taking?   losartan-hydrochlorothiazide (HYZAAR) 50-12.5 mg per tablet   No No   Sig: TAKE 1 TABLET BY MOUTH EVERY DAY   pantoprazole (PROTONIX) 40 mg tablet   No No   Sig: TAKE 1 TABLET BY MOUTH DAILY BEFORE BREAKFAST    rosuvastatin (CRESTOR) 5 mg tablet   No No   Sig: TAKE 1 TABLET (5 MG TOTAL) BY MOUTH DAILY.      Facility-Administered Medications: None       Past Medical History:   Diagnosis Date    Arthritis A couple years now    Cut back on sugar intake and it's better now. Mostly effecting my knees    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    Colon polyp     GERD (gastroesophageal reflux disease) Armin 15 2023    Had it occasionally,  but it recently became worse and two antacids wouldn't relieve it. Started to take 1 Prilosec every other day and this relived it.    Hemorrhoids     Hypertension     Inflamed seborrheic keratosis     Multiple benign polyps of large intestine     History of personal colonic polyps    Neoplasm of uncertain behavior of skin     Sebaceous cyst        Past Surgical History:   Procedure Laterality Date    COLONOSCOPY      HAND SURGERY Left     thumb ligament repair    INGUINAL HERNIA REPAIR      KNEE ARTHROSCOPY Left     TONSILLECTOMY         Family History   Problem Relation Age of Onset    Stroke Mother         stroke syndrome    Other Father         hematologic disorder     I have reviewed and agree with the history as documented.    E-Cigarette/Vaping    E-Cigarette Use Never User      E-Cigarette/Vaping Substances    Nicotine No     THC No     CBD No     Flavoring No     Other No     Unknown No      Social History     Tobacco Use    Smoking status: Former     Current packs/day: 0.00     Average packs/day: 0.5 packs/day for 5.0 years (2.5 ttl pk-yrs)     Types: Cigarettes     Start date:      Quit date:      Years since quittin.7    Smokeless tobacco: Never   Vaping Use    Vaping status: Never Used   Substance Use Topics    Alcohol use: Not Currently    Drug use: No       Review of Systems   Constitutional:  Negative for chills and fever.   HENT:  Negative for ear pain and sore throat.    Eyes:  Negative for pain and visual disturbance.   Respiratory:  Negative  for cough and shortness of breath.    Cardiovascular:  Negative for chest pain and palpitations.   Gastrointestinal:  Negative for abdominal pain and vomiting.   Genitourinary:  Positive for difficulty urinating. Negative for dysuria and hematuria.   Musculoskeletal:  Negative for arthralgias and back pain.   Skin:  Negative for color change and rash.   Neurological:  Negative for seizures and syncope.   All other systems reviewed and are negative.      Physical Exam  Physical Exam  Vitals reviewed. Exam conducted with a chaperone present.   Constitutional:       General: He is not in acute distress.     Appearance: Normal appearance. He is not ill-appearing.   HENT:      Head: Normocephalic.      Mouth/Throat:      Pharynx: Oropharynx is clear. No oropharyngeal exudate.   Eyes:      General: No scleral icterus.     Conjunctiva/sclera: Conjunctivae normal.   Cardiovascular:      Rate and Rhythm: Normal rate.      Pulses: Normal pulses.   Pulmonary:      Effort: Pulmonary effort is normal. No respiratory distress.      Breath sounds: Normal breath sounds. No stridor. No wheezing, rhonchi or rales.   Abdominal:      General: Abdomen is flat. Bowel sounds are normal.      Palpations: Abdomen is soft.      Tenderness: There is abdominal tenderness in the suprapubic area.   Genitourinary:     Penis: Normal.       Testes: Normal.         Right: Mass, tenderness, swelling, testicular hydrocele or varicocele not present. Right testis is descended. Cremasteric reflex is present.          Left: Mass, tenderness, swelling, testicular hydrocele or varicocele not present. Left testis is descended. Cremasteric reflex is present.    Musculoskeletal:         General: No swelling, tenderness or deformity. Normal range of motion.   Skin:     General: Skin is warm and dry.      Capillary Refill: Capillary refill takes less than 2 seconds.      Coloration: Skin is not jaundiced or pale.      Findings: No bruising, erythema or lesion.    Neurological:      Mental Status: He is alert and oriented to person, place, and time. Mental status is at baseline.         Vital Signs  ED Triage Vitals [09/02/24 2302]   Temperature Pulse Respirations Blood Pressure SpO2   97.6 °F (36.4 °C) 86 14 (!) 208/103 96 %      Temp Source Heart Rate Source Patient Position - Orthostatic VS BP Location FiO2 (%)   Temporal Monitor Sitting Left arm --      Pain Score       --           Vitals:    09/02/24 2302 09/03/24 0130   BP: (!) 208/103 (!) 175/88   Pulse: 86 70   Patient Position - Orthostatic VS: Sitting          Visual Acuity      ED Medications  Medications   lidocaine (URO-JET) 2 % urethral/mucosal gel 1 Application (1 Application Urethral Given 9/2/24 2324)   iohexol (OMNIPAQUE) 350 MG/ML injection (MULTI-DOSE) 100 mL (100 mL Intravenous Given 9/3/24 0006)       Diagnostic Studies  Results Reviewed       Procedure Component Value Units Date/Time    UA w Reflex to Microscopic w Reflex to Culture [[id number]] Collected: 09/02/24 2340    Lab Status: Final result Specimen: Urine, Indwelling Raya Catheter Updated: 09/02/24 2354     Color, UA Light Yellow     Clarity, UA Clear     Specific Gravity, UA 1.015     pH, UA 6.0     Leukocytes, UA Negative     Nitrite, UA Negative     Protein, UA Negative mg/dl      Glucose, UA Negative mg/dl      Ketones, UA Negative mg/dl      Urobilinogen, UA <2.0 mg/dl      Bilirubin, UA Negative     Occult Blood, UA Negative    Comprehensive metabolic panel [[id number]]  (Abnormal) Collected: 09/02/24 2329    Lab Status: Final result Specimen: Blood from Arm, Right Updated: 09/02/24 2351     Sodium 139 mmol/L      Potassium 3.8 mmol/L      Chloride 103 mmol/L      CO2 28 mmol/L      ANION GAP 8 mmol/L      BUN 21 mg/dL      Creatinine 1.17 mg/dL      Glucose 136 mg/dL      Calcium 9.5 mg/dL      AST 15 U/L      ALT 12 U/L      Alkaline Phosphatase 73 U/L      Total Protein 7.5 g/dL      Albumin 4.4 g/dL      Total Bilirubin 1.25 mg/dL       eGFR 61 ml/min/1.73sq m     Narrative:      National Kidney Disease Foundation guidelines for Chronic Kidney Disease (CKD):     Stage 1 with normal or high GFR (GFR > 90 mL/min/1.73 square meters)    Stage 2 Mild CKD (GFR = 60-89 mL/min/1.73 square meters)    Stage 3A Moderate CKD (GFR = 45-59 mL/min/1.73 square meters)    Stage 3B Moderate CKD (GFR = 30-44 mL/min/1.73 square meters)    Stage 4 Severe CKD (GFR = 15-29 mL/min/1.73 square meters)    Stage 5 End Stage CKD (GFR <15 mL/min/1.73 square meters)  Note: GFR calculation is accurate only with a steady state creatinine    Lipase [[id number]]  (Normal) Collected: 09/02/24 2329    Lab Status: Final result Specimen: Blood from Arm, Right Updated: 09/02/24 2351     Lipase 14 u/L     CBC and differential [[id number]]  (Abnormal) Collected: 09/02/24 2329    Lab Status: Final result Specimen: Blood from Arm, Right Updated: 09/02/24 2334     WBC 9.08 Thousand/uL      RBC 5.41 Million/uL      Hemoglobin 15.9 g/dL      Hematocrit 47.4 %      MCV 88 fL      MCH 29.4 pg      MCHC 33.5 g/dL      RDW 12.6 %      MPV 8.6 fL      Platelets 231 Thousands/uL      nRBC 0 /100 WBCs      Segmented % 68 %      Immature Grans % 0 %      Lymphocytes % 23 %      Monocytes % 7 %      Eosinophils Relative 2 %      Basophils Relative 0 %      Absolute Neutrophils 6.11 Thousands/µL      Absolute Immature Grans 0.03 Thousand/uL      Absolute Lymphocytes 2.08 Thousands/µL      Absolute Monocytes 0.66 Thousand/µL      Eosinophils Absolute 0.17 Thousand/µL      Basophils Absolute 0.03 Thousands/µL                    CT abdomen pelvis with contrast   Final Result by Micaela Jett MD (09/03 0158)      1.  Decompressed urinary bladder containing a Raya catheter. There is a moderate amount of intravesical air presumably from instrumentation. Correlate with urinalysis to exclude a component of cystitis.   2.  Marked prostatomegaly.   3.  Colonic diverticulosis without acute diverticulitis.    4.  Extensive reticulation with traction bronchiectasis and honeycombing throughout the lung bases consistent with fibrosis. Recommend correlation with nonemergent high-resolution chest CT.      The study was marked in EPIC for immediate notification.         Workstation performed: [id number]                    Procedures  Procedures         ED Course  ED Course as of 09/03/24 0239   Tue Sep 03, 2024   0207 CT abdomen pelvis with contrast  1.  Decompressed urinary bladder containing a Raya catheter. There is a moderate amount of intravesical air presumably from instrumentation. Correlate with urinalysis to exclude a component of cystitis.  2.  Marked prostatomegaly.  3.  Colonic diverticulosis without acute diverticulitis.  4.  Extensive reticulation with traction bronchiectasis and honeycombing throughout the lung bases consistent with fibrosis. Recommend correlation with nonemergent high-resolution chest CT.                   Identification of Seniors at Risk      Flowsheet Row Most Recent Value   (ISAR) Identification of Seniors at Risk    Before the illness or injury that brought you to the Emergency, did you need someone to help you on a regular basis? 0 Filed at: 09/02/2024 2307   In the last 24 hours, have you needed more help than usual? 1 Filed at: 09/02/2024 2307   Have you been hospitalized for one or more nights during the past 6 months? 0 Filed at: 09/02/2024 2307   In general, do you see well? 0 Filed at: 09/02/2024 2307   In general, do you have serious problems with your memory? 0 Filed at: 09/02/2024 2307   Do you take more than three different medications every day? 0 Filed at: 09/02/2024 2307   ISAR Score 1 Filed at: 09/02/2024 2307                        SBIRT 22yo+      Flowsheet Row Most Recent Value   Initial Alcohol Screen: US AUDIT-C     1. How often do you have a drink containing alcohol? 0 Filed at: 09/02/2024 2308   2. How many drinks containing alcohol do you have on a typical day you  are drinking?  0 Filed at: 09/02/2024 2308   3b. FEMALE Any Age, or MALE 65+: How often do you have 4 or more drinks on one occassion? 0 Filed at: 09/02/2024 2308   Audit-C Score 0 Filed at: 09/02/2024 2308   WILLIAM: How many times in the past year have you...    Used an illegal drug or used a prescription medication for non-medical reasons? Never Filed at: 09/02/2024 2308                      Medical Decision Making  The patient is a 73 y.o. male with a history of arthritis, BPH, GERD, hemorrhoids, hypertension, large intestine benign polyps who presents to [facility] with a chief complaint of difficulty urinating.   Patient states that he has been having difficulty urinating with only a small amount coming out of time.  Patient states he is feeling like he cannot fully empty his bladder.  Patient reports that he has some suprapubic discomfort/pressure.  Patient follows with urology for enlarged prostate.  On bladder scan patient has 700 postvoid residual.  Will insert Raya and obtain lab work as well as CT scan.  Lab work without signs of LIZ.  UA without signs of infection.  CT of the abdomen pelvis showed 1.  Decompressed urinary bladder containing a Raya catheter. There is a moderate amount of intravesical air presumably from instrumentation. Correlate with urinalysis to exclude a component of cystitis.  2.  Marked prostatomegaly.  3.  Colonic diverticulosis without acute diverticulitis.  4.  Extensive reticulation with traction bronchiectasis and honeycombing throughout the lung bases consistent with fibrosis. Recommend correlation with nonemergent high-resolution chest CT.  Patient feels improved and has no pain currently.  Raya is draining as should properly.  Discussed results with the patient and proper follow-up with urology and his PCP.  Discussed following up with the lung CT findings with his PCP as well as urology for continued management and treatment of his urinary retention. Prior  to discharge, discharge instructions were discussed with patient at bedside. Patient was provided both verbal and written instructions. Patient is understanding of the discharge instructions and is agreeable to plan of care. Return precautions were discussed with patient bedside, patient verbalized understanding of signs and symptoms that would necessitate return to the ED. All questions were answered. Patient was comfortable with the plan of care and discharged to home.         Problems Addressed:  Abdominal pain: acute illness or injury  Urinary retention: acute illness or injury    Amount and/or Complexity of Data Reviewed  Labs: ordered.  Radiology: ordered. Decision-making details documented in ED Course.    Risk  Prescription drug management.                 Disposition  Final diagnoses:   Urinary retention   Abdominal pain     Time reflects when diagnosis was documented in both MDM as applicable and the Disposition within this note       Time User Action Codes Description Comment    9/3/2024  2:09 AM Franko Chawla Add [R33.9] Urinary retention     9/3/2024  2:09 AM Franko Chawla Add [R10.9] Abdominal pain           ED Disposition       ED Disposition   Discharge    Condition   Stable    Date/Time   Tue Sep 3, 2024 0209    Comment   Estuardo Ray discharge to home/self care.                   Follow-up Information       Follow up With Specialties Details Why Contact Info Additional Information    Ilya Garcia DO Internal Medicine Schedule an appointment as soon as possible for a visit   [address] [place] [address]  [address]nd Floor  [facility] [address]  [phone]       [facility] Emergency Department Emergency Medicine  If symptoms worsen [address] [facility] [id number] [phone] [facility] Emergency Department, [address] [place], [place], [address]    Iker Whitaker MD Urology Schedule an appointment as soon as possible for a  visit   [address] [facility] [place] [address]  [phone]               Discharge Medication List as of 9/3/2024  2:17 AM        START taking these medications    Details   tamsulosin (FLOMAX) 0.4 mg Take 1 capsule (0.4 mg total) by mouth daily with dinner, Starting Tue 9/3/2024, Normal           CONTINUE these medications which have NOT CHANGED    Details   losartan-hydrochlorothiazide (HYZAAR) 50-12.5 mg per tablet TAKE 1 TABLET BY MOUTH EVERY DAY, Starting Sat 5/18/2024, Normal      pantoprazole (PROTONIX) 40 mg tablet TAKE 1 TABLET BY MOUTH DAILY BEFORE BREAKFAST, Starting Sat 5/18/2024, Normal      rosuvastatin (CRESTOR) 5 mg tablet TAKE 1 TABLET (5 MG TOTAL) BY MOUTH DAILY., Starting Sat 5/18/2024, Until Thu 11/14/2024, Normal                 PDMP Review       None            ED Provider  Electronically Signed by             Franko Chawla PA-C  09/03/24 0239

## 2024-09-03 NOTE — DISCHARGE INSTRUCTIONS
Follow-up with PCP and urology.  Continue to monitor symptoms at home.  If any symptoms worsen or new symptoms appear return to ER.

## 2024-09-04 ENCOUNTER — TELEPHONE (OUTPATIENT)
Dept: ADMINISTRATIVE | Facility: OTHER | Age: 74
End: 2024-09-04

## 2024-09-04 PROBLEM — R33.8 BENIGN PROSTATIC HYPERPLASIA WITH URINARY RETENTION: Status: ACTIVE | Noted: 2024-04-17

## 2024-09-04 NOTE — TELEPHONE ENCOUNTER
Next available is 9/11/24 at 8 AM and 230 PM -called and left detailed message about appts -asked he c/b to confirm

## 2024-09-04 NOTE — TELEPHONE ENCOUNTER
09/04/24 11:09 AM    Patient contacted to bring Advance Directive, POLST, or Living Will document to next scheduled pcp visit.[facility] Department left message.    Thank you.  Bev Paulino MA  [facility]

## 2024-09-05 ENCOUNTER — OFFICE VISIT (OUTPATIENT)
Age: 74
End: 2024-09-05
Payer: COMMERCIAL

## 2024-09-05 VITALS
WEIGHT: 172.6 LBS | TEMPERATURE: 96.8 F | BODY MASS INDEX: 22.88 KG/M2 | OXYGEN SATURATION: 97 % | RESPIRATION RATE: 18 BRPM | HEART RATE: 75 BPM | SYSTOLIC BLOOD PRESSURE: 124 MMHG | HEIGHT: 73 IN | DIASTOLIC BLOOD PRESSURE: 72 MMHG

## 2024-09-05 DIAGNOSIS — J84.10 PULMONARY FIBROSIS (HCC): ICD-10-CM

## 2024-09-05 DIAGNOSIS — N40.1 BENIGN PROSTATIC HYPERPLASIA WITH URINARY RETENTION: Primary | ICD-10-CM

## 2024-09-05 DIAGNOSIS — R33.8 BENIGN PROSTATIC HYPERPLASIA WITH URINARY RETENTION: Primary | ICD-10-CM

## 2024-09-05 PROCEDURE — 3078F DIAST BP <80 MM HG: CPT | Performed by: INTERNAL MEDICINE

## 2024-09-05 PROCEDURE — 3074F SYST BP LT 130 MM HG: CPT | Performed by: INTERNAL MEDICINE

## 2024-09-05 PROCEDURE — G2211 COMPLEX E/M VISIT ADD ON: HCPCS | Performed by: INTERNAL MEDICINE

## 2024-09-05 PROCEDURE — 1159F MED LIST DOCD IN RCRD: CPT | Performed by: INTERNAL MEDICINE

## 2024-09-05 PROCEDURE — 99214 OFFICE O/P EST MOD 30 MIN: CPT | Performed by: INTERNAL MEDICINE

## 2024-09-05 PROCEDURE — 1160F RVW MEDS BY RX/DR IN RCRD: CPT | Performed by: INTERNAL MEDICINE

## 2024-09-05 RX ORDER — DUTASTERIDE 0.5 MG/1
CAPSULE, LIQUID FILLED ORAL
COMMUNITY
Start: 2024-09-05

## 2024-09-05 NOTE — PROGRESS NOTES
"  Assessment & Plan  Benign prostatic hyperplasia with urinary retention    Follow-up with Dr. Whitaker. He will keep perez catheter in for now. Marked enlargement of his prostate on CT scan.    Pulmonary fibrosis (HCC)    He was exposed to agent orange. CT scan of the abdomen/pelvis showed \"Extensive reticulation with traction bronchiectasis and honeycombing throughout the lung bases consistent with fibrosis. Recommend correlation with nonemergent high-resolution chest CT.\"    CT chest high resolution ordered. Lungs were clear on exam.    Orders:    CT chest high resolution; Future         History of Present Illness     History of Present Illness  The patient presents for evaluation of possible lung fibrosis.    He reports a recent episode of breathlessness that occurred 2 to 3 weeks ago during a trip to [facility], where he was hiking at an altitude of 5000 feet. Despite his usual ability to hike, he found himself unable to continue after ascending about 100 yards up a hill due to severe shortness of breath. He also mentions a persistent, irritating cough that has been present for several years. His daily routine includes walking, which he is able to do without difficulty.    He went to ED for urinary retention and CT scan showed traction bronchiectasis and honeycombing in the lungs.     Review of Systems   Respiratory:  Positive for shortness of breath (with moderate exertion). Negative for cough, chest tightness and wheezing.    Cardiovascular: Negative.    Gastrointestinal: Negative.    Genitourinary:  Positive for difficulty urinating.     Objective     /72 (BP Location: Left arm, Patient Position: Sitting, Cuff Size: Standard)   Pulse 75   Temp (!) 96.8 °F (36 °C) (Tympanic)   Resp 18   Ht 6' 1\" (1.854 m)   Wt 78.3 kg (172 lb 9.6 oz)   SpO2 97%   BMI 22.77 kg/m²     Physical Exam  Constitutional:       General: He is not in acute distress.     Appearance: He is not ill-appearing.   Cardiovascular:      " Rate and Rhythm: Normal rate and regular rhythm.      Heart sounds: No murmur heard.  Pulmonary:      Effort: Pulmonary effort is normal. No respiratory distress.      Breath sounds: No wheezing.   Abdominal:      General: Bowel sounds are normal. There is no distension.      Tenderness: There is no abdominal tenderness.   Musculoskeletal:      Right lower leg: No edema.      Left lower leg: No edema.   Neurological:      Mental Status: He is alert.       [facility], DO

## 2024-09-05 NOTE — ASSESSMENT & PLAN NOTE
Follow-up with Dr. Whitaker. He will keep perez catheter in for now. Marked enlargement of his prostate on CT scan.

## 2024-09-12 ENCOUNTER — HOSPITAL ENCOUNTER (OUTPATIENT)
Dept: CT IMAGING | Facility: HOSPITAL | Age: 74
End: 2024-09-12
Attending: INTERNAL MEDICINE
Payer: COMMERCIAL

## 2024-09-12 DIAGNOSIS — J84.10 PULMONARY FIBROSIS (HCC): ICD-10-CM

## 2024-09-12 PROCEDURE — 71250 CT THORAX DX C-: CPT

## 2024-10-03 ENCOUNTER — PREP FOR PROCEDURE (OUTPATIENT)
Dept: UROLOGY | Facility: CLINIC | Age: 74
End: 2024-10-03

## 2024-10-03 ENCOUNTER — OFFICE VISIT (OUTPATIENT)
Dept: UROLOGY | Facility: CLINIC | Age: 74
End: 2024-10-03
Payer: COMMERCIAL

## 2024-10-03 ENCOUNTER — TELEPHONE (OUTPATIENT)
Age: 74
End: 2024-10-03

## 2024-10-03 ENCOUNTER — HOSPITAL ENCOUNTER (EMERGENCY)
Facility: HOSPITAL | Age: 74
Discharge: HOME | End: 2024-10-03
Attending: EMERGENCY MEDICINE
Payer: COMMERCIAL

## 2024-10-03 VITALS
SYSTOLIC BLOOD PRESSURE: 166 MMHG | HEART RATE: 72 BPM | TEMPERATURE: 98.6 F | OXYGEN SATURATION: 99 % | RESPIRATION RATE: 18 BRPM | DIASTOLIC BLOOD PRESSURE: 78 MMHG

## 2024-10-03 VITALS
SYSTOLIC BLOOD PRESSURE: 142 MMHG | TEMPERATURE: 97.7 F | HEART RATE: 77 BPM | BODY MASS INDEX: 22 KG/M2 | OXYGEN SATURATION: 96 % | DIASTOLIC BLOOD PRESSURE: 82 MMHG | WEIGHT: 166 LBS | RESPIRATION RATE: 18 BRPM | HEIGHT: 73 IN

## 2024-10-03 DIAGNOSIS — N40.1 BENIGN PROSTATIC HYPERPLASIA WITH URINARY RETENTION: Primary | ICD-10-CM

## 2024-10-03 DIAGNOSIS — R61 DIAPHORESIS: ICD-10-CM

## 2024-10-03 DIAGNOSIS — R33.8 BENIGN PROSTATIC HYPERPLASIA WITH URINARY RETENTION: Primary | ICD-10-CM

## 2024-10-03 DIAGNOSIS — R55 NEAR SYNCOPE: Primary | ICD-10-CM

## 2024-10-03 DIAGNOSIS — R33.9 URINARY RETENTION: Primary | ICD-10-CM

## 2024-10-03 DIAGNOSIS — R33.8 ACUTE URINARY RETENTION: ICD-10-CM

## 2024-10-03 LAB
ANION GAP SERPL CALCULATED.3IONS-SCNC: 8 MMOL/L (ref 4–13)
ATRIAL RATE: 76 BPM
BACTERIA UR QL AUTO: ABNORMAL /HPF
BASOPHILS # BLD AUTO: 0.04 THOUSANDS/ÂΜL (ref 0–0.1)
BASOPHILS NFR BLD AUTO: 1 % (ref 0–1)
BILIRUB UR QL STRIP: NEGATIVE
BUN SERPL-MCNC: 14 MG/DL (ref 5–25)
CALCIUM SERPL-MCNC: 9 MG/DL (ref 8.4–10.2)
CHLORIDE SERPL-SCNC: 100 MMOL/L (ref 96–108)
CLARITY UR: CLEAR
CO2 SERPL-SCNC: 27 MMOL/L (ref 21–32)
COLOR UR: ABNORMAL
CREAT SERPL-MCNC: 0.98 MG/DL (ref 0.6–1.3)
EOSINOPHIL # BLD AUTO: 0.04 THOUSAND/ÂΜL (ref 0–0.61)
EOSINOPHIL NFR BLD AUTO: 1 % (ref 0–6)
ERYTHROCYTE [DISTWIDTH] IN BLOOD BY AUTOMATED COUNT: 13.1 % (ref 11.6–15.1)
GFR SERPL CREATININE-BSD FRML MDRD: 76 ML/MIN/1.73SQ M
GLUCOSE SERPL-MCNC: 122 MG/DL (ref 65–140)
GLUCOSE UR STRIP-MCNC: NEGATIVE MG/DL
HCT VFR BLD AUTO: 44.8 % (ref 36.5–49.3)
HGB BLD-MCNC: 14.7 G/DL (ref 12–17)
HGB UR QL STRIP.AUTO: NEGATIVE
IMM GRANULOCYTES # BLD AUTO: 0.03 THOUSAND/UL (ref 0–0.2)
IMM GRANULOCYTES NFR BLD AUTO: 0 % (ref 0–2)
KETONES UR STRIP-MCNC: NEGATIVE MG/DL
LEUKOCYTE ESTERASE UR QL STRIP: NEGATIVE
LYMPHOCYTES # BLD AUTO: 0.97 THOUSANDS/ÂΜL (ref 0.6–4.47)
LYMPHOCYTES NFR BLD AUTO: 14 % (ref 14–44)
MCH RBC QN AUTO: 29 PG (ref 26.8–34.3)
MCHC RBC AUTO-ENTMCNC: 32.8 G/DL (ref 31.4–37.4)
MCV RBC AUTO: 88 FL (ref 82–98)
MONOCYTES # BLD AUTO: 0.45 THOUSAND/ÂΜL (ref 0.17–1.22)
MONOCYTES NFR BLD AUTO: 7 % (ref 4–12)
MUCOUS THREADS UR QL AUTO: ABNORMAL
NEUTROPHILS # BLD AUTO: 5.23 THOUSANDS/ÂΜL (ref 1.85–7.62)
NEUTS SEG NFR BLD AUTO: 77 % (ref 43–75)
NITRITE UR QL STRIP: NEGATIVE
NON-SQ EPI CELLS URNS QL MICRO: ABNORMAL /HPF
NRBC BLD AUTO-RTO: 0 /100 WBCS
P AXIS: 44 DEGREES
PH UR STRIP.AUTO: 6 [PH]
PLATELET # BLD AUTO: 302 THOUSANDS/UL (ref 149–390)
PMV BLD AUTO: 8.5 FL (ref 8.9–12.7)
POST-VOID RESIDUAL VOLUME, ML POC: 539 ML
POTASSIUM SERPL-SCNC: 3.6 MMOL/L (ref 3.5–5.3)
PR INTERVAL: 152 MS
PROT UR STRIP-MCNC: NEGATIVE MG/DL
QRS AXIS: 6 DEGREES
QRSD INTERVAL: 84 MS
QT INTERVAL: 402 MS
QTC INTERVAL: 452 MS
RBC # BLD AUTO: 5.07 MILLION/UL (ref 3.88–5.62)
RBC #/AREA URNS AUTO: ABNORMAL /HPF
SL AMB  POCT GLUCOSE, UA: NORMAL
SL AMB LEUKOCYTE ESTERASE,UA: NORMAL
SL AMB POCT BILIRUBIN,UA: NORMAL
SL AMB POCT BLOOD,UA: NORMAL
SL AMB POCT CLARITY,UA: CLEAR
SL AMB POCT COLOR,UA: YELLOW
SL AMB POCT KETONES,UA: NORMAL
SL AMB POCT NITRITE,UA: NORMAL
SL AMB POCT PH,UA: 5
SL AMB POCT SPECIFIC GRAVITY,UA: 1.01
SL AMB POCT URINE PROTEIN: 0.15
SL AMB POCT UROBILINOGEN: 0.2
SODIUM SERPL-SCNC: 135 MMOL/L (ref 135–147)
SP GR UR STRIP.AUTO: 1.01 (ref 1–1.03)
T WAVE AXIS: 43 DEGREES
UROBILINOGEN UR STRIP-ACNC: <2 MG/DL
VENTRICULAR RATE: 76 BPM
WBC # BLD AUTO: 6.76 THOUSAND/UL (ref 4.31–10.16)
WBC #/AREA URNS AUTO: ABNORMAL /HPF

## 2024-10-03 PROCEDURE — 85025 COMPLETE CBC W/AUTO DIFF WBC: CPT | Performed by: EMERGENCY MEDICINE

## 2024-10-03 PROCEDURE — 96360 HYDRATION IV INFUSION INIT: CPT

## 2024-10-03 PROCEDURE — 81001 URINALYSIS AUTO W/SCOPE: CPT | Performed by: PHYSICIAN ASSISTANT

## 2024-10-03 PROCEDURE — 93010 ELECTROCARDIOGRAM REPORT: CPT | Performed by: INTERNAL MEDICINE

## 2024-10-03 PROCEDURE — 80048 BASIC METABOLIC PNL TOTAL CA: CPT | Performed by: EMERGENCY MEDICINE

## 2024-10-03 PROCEDURE — 51798 US URINE CAPACITY MEASURE: CPT | Performed by: PHYSICIAN ASSISTANT

## 2024-10-03 PROCEDURE — 99284 EMERGENCY DEPT VISIT MOD MDM: CPT

## 2024-10-03 PROCEDURE — 99204 OFFICE O/P NEW MOD 45 MIN: CPT | Performed by: PHYSICIAN ASSISTANT

## 2024-10-03 PROCEDURE — 81002 URINALYSIS NONAUTO W/O SCOPE: CPT | Performed by: PHYSICIAN ASSISTANT

## 2024-10-03 PROCEDURE — 93005 ELECTROCARDIOGRAM TRACING: CPT

## 2024-10-03 PROCEDURE — 96361 HYDRATE IV INFUSION ADD-ON: CPT

## 2024-10-03 PROCEDURE — 87086 URINE CULTURE/COLONY COUNT: CPT | Performed by: PHYSICIAN ASSISTANT

## 2024-10-03 PROCEDURE — 36415 COLL VENOUS BLD VENIPUNCTURE: CPT | Performed by: EMERGENCY MEDICINE

## 2024-10-03 PROCEDURE — 99285 EMERGENCY DEPT VISIT HI MDM: CPT | Performed by: EMERGENCY MEDICINE

## 2024-10-03 RX ADMIN — SODIUM CHLORIDE 1000 ML: 0.9 INJECTION, SOLUTION INTRAVENOUS at 14:26

## 2024-10-03 NOTE — PROGRESS NOTES
10/3/2024      Chief Complaint   Patient presents with    Urinary Retention         Assessment and Plan    73 y.o. male -- New patient    1. Urinary retention  2. BPH with LUTS  3. Long standing history of elevated PSA s/p negative biopsies x 2  - UA today positive for leukocytes and blood. Urine sent for micro and culture   - PVR today 500 mL.  Patient uncomfortable with full bladder.  Raya catheter was inserted after verbal consent.  Mild resistance and narrowing of urethra during Raya catheter insertion, however was able to place with 1 attempt into bladder.  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  1200 mL urine return, clear yellow.  Mild burning post Raya catheter insertion, however declined significant pain.  Patient was walked to check out without difficulty.  Approximately 5 minutes after visit, patient developed diaphoresis, lightheadedness, pallor.  I was asked to come assess the patient.  Vital signs stable other than tachycardia at 112 bpm.  Patient was assisted into wheelchair and brought into patient room where he was laid flat in exam table with legs bent.  Vital signs continued to be stable other than tachycardia.  Patient then developed abdominal cramping.  Denies nausea and no vomiting.  EMS was then called due to continued symptoms.  Upon arrival of EMS approximately 20 minutes after initial symptoms started, patient began to have symptom benefit.  After discussion with wife and patient, the need for evaluation in the emergency department was recommended due to near syncope event and length of time of symptoms.  Patient and wife agreeable and patient was taken to the nearest hospital.  - PSA (4/11/24) 9.12.  Will need this repeated. Prior 2 negative biopsies for similar PSA  - Follow up for cysto TRUS.  Due to above events, OR cystoscopy TRUS was recommended  - Call with any questions or concerns in the meantime  - All questions answered; patient understands and agrees with plan        History of Present Illness  Estuardo Ray is a 73 y.o. male new patient here for evaluation of urinary retention, BPH with lower urinary tract symptoms, longstanding history of elevated PSA status post negative biopsies x 2.    Patient has a longstanding history of elevated PSA ranging between 5 and 10.  Patient had office TRUS biopsy more than 5 years ago which was negative for malignancies.  Due to elevated PSA, patient had MRI prostate performed in 2021 with questionable PI-RADS 3 lesion.  He underwent fusion guided biopsy of this lesion which was fortunately negative for malignancy.  He continues to have elevated PSA and most recent PSA from April was 9.12.    Patient recently developed urinary retention.  Raya catheter was inserted in the beginning of September in the emergency department.  Patient had trial of void with prior urologist, however needed Raya catheter reinserted.  He was started on Flomax and dutasteride at that time.  Patient had Raya catheter removed with prior urologist yesterday, however has developed difficulty urinating and presents today for transfer of care.  Patient uncomfortable with large volume retention.  Denies prior difficulties with retention or needing for Raya catheter.  Denies prior  surgical manipulation.  Denies family history of  malignancies.    Review of Systems   Constitutional:  Negative for activity change, appetite change, chills and fever.   HENT:  Negative for congestion and trouble swallowing.    Respiratory:  Negative for cough and shortness of breath.    Cardiovascular:  Negative for chest pain, palpitations and leg swelling.   Gastrointestinal:  Negative for abdominal pain, constipation, diarrhea, nausea and vomiting.   Genitourinary:  Positive for difficulty urinating. Negative for dysuria, flank pain, frequency, hematuria and urgency.   Musculoskeletal:  Negative for back pain and gait problem.   Skin:  Negative for wound.  "  Allergic/Immunologic: Negative for immunocompromised state.   Neurological:  Negative for dizziness and syncope.   Hematological:  Does not bruise/bleed easily.   Psychiatric/Behavioral:  Negative for confusion.    All other systems reviewed and are negative.      Vitals  Vitals:    10/03/24 1237   BP: 142/82   BP Location: Left arm   Patient Position: Sitting   Cuff Size: Standard   Pulse: 77   Resp: 18   Temp: 97.7 °F (36.5 °C)   TempSrc: Temporal   SpO2: 96%   Weight: 75.3 kg (166 lb)   Height: 6' 1\" (1.854 m)       Physical Exam  Constitutional:       General: He is not in acute distress.     Appearance: Normal appearance. He is not ill-appearing, toxic-appearing or diaphoretic.   HENT:      Head: Normocephalic.      Nose: No congestion.   Eyes:      General: No scleral icterus.        Right eye: No discharge.         Left eye: No discharge.      Conjunctiva/sclera: Conjunctivae normal.      Pupils: Pupils are equal, round, and reactive to light.   Pulmonary:      Effort: Pulmonary effort is normal.   Musculoskeletal:      Cervical back: Normal range of motion.   Skin:     General: Skin is warm and dry.      Coloration: Skin is not jaundiced or pale.      Findings: No bruising, erythema, lesion or rash.   Neurological:      General: No focal deficit present.      Mental Status: He is alert and oriented to person, place, and time. Mental status is at baseline.      Gait: Gait normal.   Psychiatric:         Mood and Affect: Mood normal.         Behavior: Behavior normal.         Thought Content: Thought content normal.         Judgment: Judgment normal.           Past History  Past Medical History:   Diagnosis Date    Arthritis A couple years now    Cut back on sugar intake and it's better now. Mostly effecting my knees    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    Colon polyp     GERD (gastroesophageal reflux disease) Armin 15 2023    Had it occasionally,  but it recently became worse and two " antacids wouldn't relieve it. Started to take 1 Prilosec every other day and this relived it.    Hemorrhoids     Hypertension     Inflamed seborrheic keratosis     Multiple benign polyps of large intestine     History of personal colonic polyps    Neoplasm of uncertain behavior of skin     Sebaceous cyst      Social History     Socioeconomic History    Marital status: /Civil Union     Spouse name: None    Number of children: None    Years of education: None    Highest education level: None   Occupational History    None   Tobacco Use    Smoking status: Former     Current packs/day: 0.00     Average packs/day: 0.5 packs/day for 5.0 years (2.5 ttl pk-yrs)     Types: Cigarettes     Start date:      Quit date:      Years since quittin.7    Smokeless tobacco: Never   Vaping Use    Vaping status: Never Used   Substance and Sexual Activity    Alcohol use: Not Currently    Drug use: No    Sexual activity: Yes     Partners: Female     Birth control/protection: Male Sterilization   Other Topics Concern    None   Social History Narrative    Active advance directive     Social Determinants of Health     Financial Resource Strain: Low Risk  (10/10/2023)    Overall Financial Resource Strain (CARDIA)     Difficulty of Paying Living Expenses: Not hard at all   Food Insecurity: Not on file   Transportation Needs: No Transportation Needs (10/10/2023)    PRAPARE - Transportation     Lack of Transportation (Medical): No     Lack of Transportation (Non-Medical): No   Physical Activity: Sufficiently Active (3/9/2021)    Exercise Vital Sign     Days of Exercise per Week: 7 days     Minutes of Exercise per Session: 30 min   Stress: No Stress Concern Present (3/9/2021)    [employer] - Occupational Stress Questionnaire     Feeling of Stress : Not at all   Social Connections: Not on file   Intimate Partner Violence: Not on file   Housing Stability: Not on file     Social History     Tobacco  Use   Smoking Status Former    Current packs/day: 0.00    Average packs/day: 0.5 packs/day for 5.0 years (2.5 ttl pk-yrs)    Types: Cigarettes    Start date:     Quit date:     Years since quittin.7   Smokeless Tobacco Never     Family History   Problem Relation Age of Onset    Stroke Mother         stroke syndrome    Other Father         hematologic disorder       The following portions of the patient's history were reviewed and updated as appropriate: allergies, current medications, past medical history, past social history, past surgical history and problem list.    Results  Recent Results (from the past 1 hour(s))   ECG 12 lead    Collection Time: 10/03/24  2:14 PM   Result Value Ref Range    Ventricular Rate 76 BPM    Atrial Rate 76 BPM    NC Interval 152 ms    QRSD Interval 84 ms    QT Interval 402 ms    QTC Interval 452 ms    P Mobile 44 degrees    QRS Axis 6 degrees    T Wave Axis 43 degrees   ]  Lab Results   Component Value Date    PSA 9.12 (H) 2024    PSA 7.0 (H) 2023    PSA 9.5 (H) 09/15/2022    PSA 7.8 (H) 2022     Lab Results   Component Value Date    GLUCOSE 110 2015    CALCIUM 9.5 2024     2015    K 3.8 2024    CO2 28 2024     2024    BUN 21 2024    CREATININE 1.17 2024     Lab Results   Component Value Date    WBC 9.08 2024    HGB 15.9 2024    HCT 47.4 2024    MCV 88 2024     2024       Mya Trinidad PA-C

## 2024-10-03 NOTE — ED PROVIDER NOTES
Final diagnoses:   Near syncope   Diaphoresis   Acute urinary retention     ED Disposition       ED Disposition   Discharge    Condition   Stable    Date/Time   Thu Oct 3, 2024  2:54 PM    Comment   Estuardo S Cory discharge to home/self care.                   Assessment & Plan       Medical Decision Making  73-year-old male history of hypertension presenting with near syncopal event.  Syncope with other above symptoms in setting of urinary retention and recent Raya catheter placement, suspect likely vagal response.  Patient currently asymptomatic at baseline.  Plan for EKG and basic labs.  IV fluids.  Reassess.    EKG interpreted by me with normal sinus rhythm and no acute ST abnormality.  Labs interpreted by me without significant acute process.  Remains asymptomatic.  Ambulatory at baseline without difficulty. Discussed results and recommendations. Advised follow up PCP and urology. Medication recommendations. Given instructions and return precautions. Patient/family at bedside acknowledged understanding of all written and verbal instructions and return precautions. Discharged.     Amount and/or Complexity of Data Reviewed  Labs: ordered.             Medications   sodium chloride 0.9 % bolus 1,000 mL (1,000 mL Intravenous New Bag 10/3/24 1426)       ED Risk Strat Scores                           SBIRT 22yo+      Flowsheet Row Most Recent Value   Initial Alcohol Screen: US AUDIT-C     1. How often do you have a drink containing alcohol? 0 Filed at: 10/03/2024 1416   2. How many drinks containing alcohol do you have on a typical day you are drinking?  0 Filed at: 10/03/2024 1416   3a. Male UNDER 65: How often do you have five or more drinks on one occasion? 0 Filed at: 10/03/2024 1416   Audit-C Score 0 Filed at: 10/03/2024 1416   WILLIAM: How many times in the past year have you...    Used an illegal drug or used a prescription medication for non-medical reasons? Never Filed at: 10/03/2024 1416                             History of Present Illness       Chief Complaint   Patient presents with    Syncope     PT BIB EMS was @ his urology appointment when started  feeling light headed and diaphoresis after getting a perez        Past Medical History:   Diagnosis Date    Arthritis A couple years now    Cut back on sugar intake and it's better now. Mostly effecting my knees    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    Colon polyp     GERD (gastroesophageal reflux disease) Armin 15 2023    Had it occasionally,  but it recently became worse and two antacids wouldn't relieve it. Started to take 1 Prilosec every other day and this relived it.    Hemorrhoids     Hypertension     Inflamed seborrheic keratosis     Multiple benign polyps of large intestine     History of personal colonic polyps    Neoplasm of uncertain behavior of skin     Sebaceous cyst       Past Surgical History:   Procedure Laterality Date    COLONOSCOPY      HAND SURGERY Left     thumb ligament repair    INGUINAL HERNIA REPAIR      KNEE ARTHROSCOPY Left     TONSILLECTOMY        Family History   Problem Relation Age of Onset    Stroke Mother         stroke syndrome    Other Father         hematologic disorder      Social History     Tobacco Use    Smoking status: Former     Current packs/day: 0.00     Average packs/day: 0.5 packs/day for 5.0 years (2.5 ttl pk-yrs)     Types: Cigarettes     Start date:      Quit date:      Years since quittin.7    Smokeless tobacco: Never   Vaping Use    Vaping status: Never Used   Substance Use Topics    Alcohol use: Not Currently    Drug use: No      E-Cigarette/Vaping    E-Cigarette Use Never User       E-Cigarette/Vaping Substances    Nicotine No     THC No     CBD No     Flavoring No     Other No     Unknown No       I have reviewed and agree with the history as documented.     73-year-old male history of hypertension presenting with near syncopal event.  Patient reports known prostatomegaly and  difficulty with urinary retention for the past month.  Patient reports earlier today he had his third Raya catheter placed this month.  Patient reports he had about 1100 cc of urine out after placement.  Patient reports shortly after Raya catheter placement he got up to leave the office when he suddenly felt lightheaded, warm, flushed, diffusely sweaty, feeling of need to have a bowel movement.  Patient reports that he sat down and was back to his baseline in about 15 minutes.  Patient reports that he is currently asymptomatic.  Denies any chest pain shortness of breath.  Denies any abdominal pain nausea vomiting.  Denies any other complaints.  Chart reviewed.    Past Medical History:  A couple years now: Arthritis      Comment:  Cut back on sugar intake and it's better now. Mostly                effecting my knees  No date: Benign neoplasm of large intestine      Comment:  Benign Neoplasm colon  No date: Colon polyp  Armin 15 2023: GERD (gastroesophageal reflux disease)      Comment:  Had it occasionally,  but it recently became worse and                two antacids wouldn't relieve it. Started to take 1                Prilosec every other day and this relived it.  No date: Hemorrhoids  No date: Hypertension  No date: Inflamed seborrheic keratosis  No date: Multiple benign polyps of large intestine      Comment:  History of personal colonic polyps  No date: Neoplasm of uncertain behavior of skin  No date: Sebaceous cyst  Family History: non-contributory  Social History          Review of Systems   Constitutional:  Positive for diaphoresis. Negative for appetite change, chills, fever and unexpected weight change.   HENT:  Negative for congestion and rhinorrhea.    Eyes:  Negative for photophobia and visual disturbance.   Respiratory:  Negative for cough, chest tightness and shortness of breath.    Cardiovascular:  Negative for chest pain, palpitations and leg swelling.   Gastrointestinal:  Negative for abdominal  distention, abdominal pain, blood in stool, constipation, diarrhea, nausea and vomiting.   Genitourinary:  Positive for decreased urine volume. Negative for dysuria and hematuria.   Musculoskeletal:  Negative for back pain, joint swelling, neck pain and neck stiffness.   Skin:  Negative for color change, pallor, rash and wound.   Neurological:  Positive for light-headedness. Negative for dizziness, syncope, weakness and headaches.   Psychiatric/Behavioral:  Negative for agitation.    All other systems reviewed and are negative.          Objective       ED Triage Vitals [10/03/24 1413]   Temperature Pulse Blood Pressure Respirations SpO2 Patient Position - Orthostatic VS   98.6 °F (37 °C) 72 166/78 18 99 % --      Temp Source Heart Rate Source BP Location FiO2 (%) Pain Score    Oral Monitor Right arm -- --      Vitals      Date and Time Temp Pulse SpO2 Resp BP Pain Score FACES Pain Rating User   10/03/24 1413 98.6 °F (37 °C) 72 99 % 18 166/78 -- -- NELIDA            Physical Exam  Vitals and nursing note reviewed.   Constitutional:       General: He is not in acute distress.     Appearance: Normal appearance. He is well-developed. He is not ill-appearing, toxic-appearing or diaphoretic.   HENT:      Head: Normocephalic and atraumatic.      Nose: Nose normal. No congestion or rhinorrhea.      Mouth/Throat:      Mouth: Mucous membranes are moist.      Pharynx: Oropharynx is clear. No oropharyngeal exudate or posterior oropharyngeal erythema.   Eyes:      General: No scleral icterus.        Right eye: No discharge.         Left eye: No discharge.      Extraocular Movements: Extraocular movements intact.      Conjunctiva/sclera: Conjunctivae normal.      Pupils: Pupils are equal, round, and reactive to light.   Neck:      Vascular: No JVD.      Trachea: No tracheal deviation.      Comments: Supple. Normal range of motion.   Cardiovascular:      Rate and Rhythm: Normal rate and regular rhythm.      Heart sounds: Normal heart  sounds. No murmur heard.     No friction rub. No gallop.      Comments: Normal rate and regular rhythm  Pulmonary:      Effort: Pulmonary effort is normal. No respiratory distress.      Breath sounds: Normal breath sounds. No stridor. No wheezing or rales.      Comments: Clear to auscultation bilaterally  Chest:      Chest wall: No tenderness.   Abdominal:      General: Bowel sounds are normal. There is no distension.      Palpations: Abdomen is soft.      Tenderness: There is no abdominal tenderness. There is no right CVA tenderness, left CVA tenderness, guarding or rebound.      Comments: Soft, nontender, nondistended.  Normal bowel sounds throughout   Musculoskeletal:         General: No swelling, tenderness, deformity or signs of injury. Normal range of motion.      Cervical back: Normal range of motion and neck supple. No rigidity. No muscular tenderness.      Right lower leg: No edema.      Left lower leg: No edema.   Lymphadenopathy:      Cervical: No cervical adenopathy.   Skin:     General: Skin is warm and dry.      Coloration: Skin is not pale.      Findings: No erythema or rash.   Neurological:      General: No focal deficit present.      Mental Status: He is alert. Mental status is at baseline.      Sensory: No sensory deficit.      Motor: No weakness or abnormal muscle tone.      Coordination: Coordination normal.      Gait: Gait normal.      Comments: Alert.  Strength and sensation grossly intact.  Ambulatory without difficulty at baseline.    Psychiatric:         Behavior: Behavior normal.         Thought Content: Thought content normal.         Results Reviewed       Procedure Component Value Units Date/Time    Basic metabolic panel [[id number]] Collected: 10/03/24 1426    Lab Status: Final result Specimen: Blood from Arm, Left Updated: 10/03/24 1454     Sodium 135 mmol/L      Potassium 3.6 mmol/L      Chloride 100 mmol/L      CO2 27 mmol/L      ANION GAP 8 mmol/L      BUN 14 mg/dL      Creatinine  0.98 mg/dL      Glucose 122 mg/dL      Calcium 9.0 mg/dL      eGFR 76 ml/min/1.73sq m     Narrative:      National Kid[employer] Foundation guidelines for Chronic Kidney Disease (CKD):     Stage 1 with normal or high GFR (GFR > 90 mL/min/1.73 square meters)    Stage 2 Mild CKD (GFR = 60-89 mL/min/1.73 square meters)    Stage 3A Moderate CKD (GFR = 45-59 mL/min/1.73 square meters)    Stage 3B Moderate CKD (GFR = 30-44 mL/min/1.73 square meters)    Stage 4 Severe CKD (GFR = 15-29 mL/min/1.73 square meters)    Stage 5 End Stage CKD (GFR <15 mL/min/1.73 square meters)  Note: GFR calculation is accurate only with a steady state creatinine    CBC and differential [[id number]]  (Abnormal) Collected: 10/03/24 1426    Lab Status: Final result Specimen: Blood from Arm, Left Updated: 10/03/24 1438     WBC 6.76 Thousand/uL      RBC 5.07 Million/uL      Hemoglobin 14.7 g/dL      Hematocrit 44.8 %      MCV 88 fL      MCH 29.0 pg      MCHC 32.8 g/dL      RDW 13.1 %      MPV 8.5 fL      Platelets 302 Thousands/uL      nRBC 0 /100 WBCs      Segmented % 77 %      Immature Grans % 0 %      Lymphocytes % 14 %      Monocytes % 7 %      Eosinophils Relative 1 %      Basophils Relative 1 %      Absolute Neutrophils 5.23 Thousands/µL      Absolute Immature Grans 0.03 Thousand/uL      Absolute Lymphocytes 0.97 Thousands/µL      Absolute Monocytes 0.45 Thousand/µL      Eosinophils Absolute 0.04 Thousand/µL      Basophils Absolute 0.04 Thousands/µL             No orders to display       Procedures    ED Medication and Procedure Management   Prior to Admission Medications   Prescriptions Last Dose Informant Patient Reported? Taking?   dutasteride (AVODART) 0.5 mg capsule  Self Yes No   losartan-hydrochlorothiazide (HYZAAR) 50-12.5 mg per tablet  Self No No   Sig: TAKE 1 TABLET BY MOUTH EVERY DAY   pantoprazole (PROTONIX) 40 mg tablet  Self No No   Sig: TAKE 1 TABLET BY MOUTH DAILY BEFORE BREAKFAST   Patient taking differently: Take 40 mg by  mouth daily before breakfast Patient has not yet stated taking medication   rosuvastatin (CRESTOR) 5 mg tablet  Self No No   Sig: TAKE 1 TABLET (5 MG TOTAL) BY MOUTH DAILY.   tamsulosin (FLOMAX) 0.4 mg  Self No No   Sig: Take 1 capsule (0.4 mg total) by mouth daily with dinner      Facility-Administered Medications: None     Patient's Medications   Discharge Prescriptions    No medications on file     No discharge procedures on file.  ED SEPSIS DOCUMENTATION   Time reflects when diagnosis was documented in both MDM as applicable and the Disposition within this note       Time User Action Codes Description Comment    10/3/2024  2:22 PM Krunal Diamond [R55] Near syncope     10/3/2024  2:22 PM Krunal Diamond [R61] Diaphoresis     10/3/2024  2:22 PM Krunal Diamond [R33.8] Acute urinary retention                  Krunal Diamond MD  10/03/24 1548

## 2024-10-03 NOTE — TELEPHONE ENCOUNTER
New Patient Triage  Please Triage:   New Patient-   What is the reason for the patients appointment?    R33.9 (ICD-10-CM) - Urinary retention  R10.9 (ICD-10-CM) - Abdominal pain     Retention pt  just had a catheter removed yesterday and is having issues urinating.  This was the second catheter that was removed. PT had it removed by [facility] but wants to transfer care to [facility] because he uses [facility] for all his Drs.   PT stated that he is only going about 25ml at a time and is having difficulty urinating.     Imaging/Lab Results:     Insurance:   Do we accept the pt's insurance or is the patient Self-Pay?  Provider & Plan:  [employer]   Member ID#: [id number]     History  Has the pt had any previous urologist? [facility]     Have pt records been requested? No    Has the pt had any outside testing done? no    Does the pt have a personal history of cancer?: no

## 2024-10-03 NOTE — H&P (VIEW-ONLY)
10/3/2024      Chief Complaint   Patient presents with    Urinary Retention         Assessment and Plan    73 y.o. male -- New patient    1. Urinary retention  2. BPH with LUTS  3. Long standing history of elevated PSA s/p negative biopsies x 2  - UA today positive for leukocytes and blood. Urine sent for micro and culture   - PVR today 500 mL.  Patient uncomfortable with full bladder.  Raya catheter was inserted after verbal consent.  Mild resistance and narrowing of urethra during Raya catheter insertion, however was able to place with 1 attempt into bladder.  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  1200 mL urine return, clear yellow.  Mild burning post Raya catheter insertion, however declined significant pain.  Patient was walked to check out without difficulty.  Approximately 5 minutes after visit, patient developed diaphoresis, lightheadedness, pallor.  I was asked to come assess the patient.  Vital signs stable other than tachycardia at 112 bpm.  Patient was assisted into wheelchair and brought into patient room where he was laid flat in exam table with legs bent.  Vital signs continued to be stable other than tachycardia.  Patient then developed abdominal cramping.  Denies nausea and no vomiting.  EMS was then called due to continued symptoms.  Upon arrival of EMS approximately 20 minutes after initial symptoms started, patient began to have symptom benefit.  After discussion with wife and patient, the need for evaluation in the emergency department was recommended due to near syncope event and length of time of symptoms.  Patient and wife agreeable and patient was taken to the nearest hospital.  - PSA (4/11/24) 9.12.  Will need this repeated. Prior 2 negative biopsies for similar PSA  - Follow up for cysto TRUS.  Due to above events, OR cystoscopy TRUS was recommended  - Call with any questions or concerns in the meantime  - All questions answered; patient understands and agrees with plan        History of Present Illness  Estuardo Ray is a 73 y.o. male new patient here for evaluation of urinary retention, BPH with lower urinary tract symptoms, longstanding history of elevated PSA status post negative biopsies x 2.    Patient has a longstanding history of elevated PSA ranging between 5 and 10.  Patient had office TRUS biopsy more than 5 years ago which was negative for malignancies.  Due to elevated PSA, patient had MRI prostate performed in 2021 with questionable PI-RADS 3 lesion.  He underwent fusion guided biopsy of this lesion which was fortunately negative for malignancy.  He continues to have elevated PSA and most recent PSA from April was 9.12.    Patient recently developed urinary retention.  Raya catheter was inserted in the beginning of September in the emergency department.  Patient had trial of void with prior urologist, however needed Raya catheter reinserted.  He was started on Flomax and dutasteride at that time.  Patient had Raya catheter removed with prior urologist yesterday, however has developed difficulty urinating and presents today for transfer of care.  Patient uncomfortable with large volume retention.  Denies prior difficulties with retention or needing for Raya catheter.  Denies prior  surgical manipulation.  Denies family history of  malignancies.    Review of Systems   Constitutional:  Negative for activity change, appetite change, chills and fever.   HENT:  Negative for congestion and trouble swallowing.    Respiratory:  Negative for cough and shortness of breath.    Cardiovascular:  Negative for chest pain, palpitations and leg swelling.   Gastrointestinal:  Negative for abdominal pain, constipation, diarrhea, nausea and vomiting.   Genitourinary:  Positive for difficulty urinating. Negative for dysuria, flank pain, frequency, hematuria and urgency.   Musculoskeletal:  Negative for back pain and gait problem.   Skin:  Negative for wound.  "  Allergic/Immunologic: Negative for immunocompromised state.   Neurological:  Negative for dizziness and syncope.   Hematological:  Does not bruise/bleed easily.   Psychiatric/Behavioral:  Negative for confusion.    All other systems reviewed and are negative.      Vitals  Vitals:    10/03/24 1237   BP: 142/82   BP Location: Left arm   Patient Position: Sitting   Cuff Size: Standard   Pulse: 77   Resp: 18   Temp: 97.7 °F (36.5 °C)   TempSrc: Temporal   SpO2: 96%   Weight: 75.3 kg (166 lb)   Height: 6' 1\" (1.854 m)       Physical Exam  Constitutional:       General: He is not in acute distress.     Appearance: Normal appearance. He is not ill-appearing, toxic-appearing or diaphoretic.   HENT:      Head: Normocephalic.      Nose: No congestion.   Eyes:      General: No scleral icterus.        Right eye: No discharge.         Left eye: No discharge.      Conjunctiva/sclera: Conjunctivae normal.      Pupils: Pupils are equal, round, and reactive to light.   Pulmonary:      Effort: Pulmonary effort is normal.   Musculoskeletal:      Cervical back: Normal range of motion.   Skin:     General: Skin is warm and dry.      Coloration: Skin is not jaundiced or pale.      Findings: No bruising, erythema, lesion or rash.   Neurological:      General: No focal deficit present.      Mental Status: He is alert and oriented to person, place, and time. Mental status is at baseline.      Gait: Gait normal.   Psychiatric:         Mood and Affect: Mood normal.         Behavior: Behavior normal.         Thought Content: Thought content normal.         Judgment: Judgment normal.           Past History  Past Medical History:   Diagnosis Date    Arthritis A couple years now    Cut back on sugar intake and it's better now. Mostly effecting my knees    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    Colon polyp     GERD (gastroesophageal reflux disease) Armin 15 2023    Had it occasionally,  but it recently became worse and two " antacids wouldn't relieve it. Started to take 1 Prilosec every other day and this relived it.    Hemorrhoids     Hypertension     Inflamed seborrheic keratosis     Multiple benign polyps of large intestine     History of personal colonic polyps    Neoplasm of uncertain behavior of skin     Sebaceous cyst      Social History     Socioeconomic History    Marital status: /Civil Union     Spouse name: None    Number of children: None    Years of education: None    Highest education level: None   Occupational History    None   Tobacco Use    Smoking status: Former     Current packs/day: 0.00     Average packs/day: 0.5 packs/day for 5.0 years (2.5 ttl pk-yrs)     Types: Cigarettes     Start date:      Quit date:      Years since quittin.7    Smokeless tobacco: Never   Vaping Use    Vaping status: Never Used   Substance and Sexual Activity    Alcohol use: Not Currently    Drug use: No    Sexual activity: Yes     Partners: Female     Birth control/protection: Male Sterilization   Other Topics Concern    None   Social History Narrative    Active advance directive     Social Determinants of Health     Financial Resource Strain: Low Risk  (10/10/2023)    Overall Financial Resource Strain (CARDIA)     Difficulty of Paying Living Expenses: Not hard at all   Food Insecurity: Not on file   Transportation Needs: No Transportation Needs (10/10/2023)    PRAPARE - Transportation     Lack of Transportation (Medical): No     Lack of Transportation (Non-Medical): No   Physical Activity: Sufficiently Active (3/9/2021)    Exercise Vital Sign     Days of Exercise per Week: 7 days     Minutes of Exercise per Session: 30 min   Stress: No Stress Concern Present (3/9/2021)    Citizen of [place] of Occupational [employer] - Occupational Stress Questionnaire     Feeling of Stress : Not at all   Social Connections: Not on file   Intimate Partner Violence: Not on file   Housing Stability: Not on file     Social History     Tobacco  Use   Smoking Status Former    Current packs/day: 0.00    Average packs/day: 0.5 packs/day for 5.0 years (2.5 ttl pk-yrs)    Types: Cigarettes    Start date:     Quit date:     Years since quittin.7   Smokeless Tobacco Never     Family History   Problem Relation Age of Onset    Stroke Mother         stroke syndrome    Other Father         hematologic disorder       The following portions of the patient's history were reviewed and updated as appropriate: allergies, current medications, past medical history, past social history, past surgical history and problem list.    Results  Recent Results (from the past 1 hour(s))   ECG 12 lead    Collection Time: 10/03/24  2:14 PM   Result Value Ref Range    Ventricular Rate 76 BPM    Atrial Rate 76 BPM    IL Interval 152 ms    QRSD Interval 84 ms    QT Interval 402 ms    QTC Interval 452 ms    P [place] 44 degrees    QRS Axis 6 degrees    T Wave Axis 43 degrees   ]  Lab Results   Component Value Date    PSA 9.12 (H) 2024    PSA 7.0 (H) 2023    PSA 9.5 (H) 09/15/2022    PSA 7.8 (H) 2022     Lab Results   Component Value Date    GLUCOSE 110 2015    CALCIUM 9.5 2024     2015    K 3.8 2024    CO2 28 2024     2024    BUN 21 2024    CREATININE 1.17 2024     Lab Results   Component Value Date    WBC 9.08 2024    HGB 15.9 2024    HCT 47.4 2024    MCV 88 2024     2024       Mya Trinidad PA-C

## 2024-10-04 LAB — BACTERIA UR CULT: NORMAL

## 2024-10-04 RX ORDER — DUTASTERIDE 0.5 MG/1
CAPSULE, LIQUID FILLED ORAL
Refills: 0 | OUTPATIENT
Start: 2024-10-04

## 2024-10-04 NOTE — PROGRESS NOTES
Spoke with patient and confirmed surgery date of:10/07/24  Type of surgery:Cysto Trus possiable dialation   Operating physician: Dr. Rodriguez  [facility]    Verbally went over prep with patient on: 10/04/24  NPO  Bowel prep? No  Hospital calls afternoon prior with arrival time -Calls Friday afternoon for Monday surgeries  Patient needs ride to and from surgery (outpatient/inpatient)   Pre-op testing to be done 2 weeks prior to surgery  U/c was done in ER  Blood thinners:   PAT WILL CALL A [facility] PRIOR   Clearances needed: none    Mailed/emailed to patient on:10/04/24  Copy of packet scanned into Media on:10/04/24  Labs in packet  Soap / Bowel prep in packet  Pre-op & Post-op in packet  Dates of H&P and post-op if needed    Consent: on admit  If needed:    Medical/Cardiac Clearance:  Appt with:  Appt date and time:  Date clearance form faxed:  Best fax number:    Medication Suspension of: Medication Name / how many days  Ordering provider:  Faxed Medication Suspension form on:  Best fax number:    [place]:  Faxed form to pharmacy on:    RBC blood bank done on:    Rep info:  Emailed rep on date:

## 2024-10-04 NOTE — PROGRESS NOTES
From  epic secure chat     yeah sounds good  make sure US there  uromax 24 dilator in room just in case

## 2024-10-06 ENCOUNTER — ANESTHESIA EVENT (OUTPATIENT)
Dept: PERIOP | Facility: HOSPITAL | Age: 74
End: 2024-10-06
Payer: COMMERCIAL

## 2024-10-06 NOTE — ANESTHESIA PREPROCEDURE EVALUATION
Procedure:  CYSTOSCOPY TRUS, possible dilation (Bladder)    Relevant Problems   ANESTHESIA   (-) History of anesthesia complications      CARDIO   (+) Mixed hyperlipidemia   (+) Primary hypertension   (-) Chest pain   (-) GRAYSON (dyspnea on exertion)      /RENAL   (+) Benign prostatic hyperplasia with urinary retention      PULMONARY   (-) Shortness of breath   (-) Sleep apnea   (-) URI (upper respiratory infection)        Left Ventricle: Left ventricular cavity size is normal. Wall thickness is normal. The left ventricular ejection fraction is 65% by visual estimation.. Systolic function is normal. Wall motion is normal. Diastolic function is mildly abnormal, consistent with grade I (abnormal) relaxation.    Left Atrium: The atrium is mildly dilated.  Physical Exam    Airway    Mallampati score: II  TM Distance: >3 FB  Neck ROM: full     Dental       Cardiovascular      Pulmonary      Other Findings        Anesthesia Plan  ASA Score- 2     Anesthesia Type- general with ASA Monitors.         Additional Monitors:     Airway Plan:            Plan Factors-Exercise tolerance (METS): >4 METS.    Chart reviewed. EKG reviewed.  Existing labs reviewed. Patient summary reviewed.                  Induction- intravenous.    Postoperative Plan-         Informed Consent- Anesthetic plan and risks discussed with patient.  I personally reviewed this patient with the CRNA. Discussed and agreed on the Anesthesia Plan with the CRNA..

## 2024-10-07 ENCOUNTER — ANESTHESIA (OUTPATIENT)
Dept: PERIOP | Facility: HOSPITAL | Age: 74
End: 2024-10-07
Payer: COMMERCIAL

## 2024-10-07 ENCOUNTER — TELEPHONE (OUTPATIENT)
Dept: UROLOGY | Facility: CLINIC | Age: 74
End: 2024-10-07

## 2024-10-07 ENCOUNTER — HOSPITAL ENCOUNTER (OUTPATIENT)
Dept: ULTRASOUND IMAGING | Facility: HOSPITAL | Age: 74
Discharge: HOME | End: 2024-10-07
Payer: COMMERCIAL

## 2024-10-07 ENCOUNTER — HOSPITAL ENCOUNTER (OUTPATIENT)
Facility: HOSPITAL | Age: 74
Setting detail: OUTPATIENT SURGERY
Discharge: HOME | End: 2024-10-07
Attending: UROLOGY | Admitting: UROLOGY
Payer: COMMERCIAL

## 2024-10-07 VITALS
BODY MASS INDEX: 22 KG/M2 | HEIGHT: 73 IN | OXYGEN SATURATION: 99 % | DIASTOLIC BLOOD PRESSURE: 80 MMHG | WEIGHT: 166 LBS | SYSTOLIC BLOOD PRESSURE: 158 MMHG | HEART RATE: 53 BPM | RESPIRATION RATE: 20 BRPM | TEMPERATURE: 97.2 F

## 2024-10-07 DIAGNOSIS — R93.89: ICD-10-CM

## 2024-10-07 DIAGNOSIS — R33.9 URINARY RETENTION: ICD-10-CM

## 2024-10-07 PROCEDURE — 87086 URINE CULTURE/COLONY COUNT: CPT | Performed by: UROLOGY

## 2024-10-07 PROCEDURE — 76998 US GUIDE INTRAOP: CPT

## 2024-10-07 PROCEDURE — 76872 US TRANSRECTAL: CPT | Performed by: UROLOGY

## 2024-10-07 PROCEDURE — 87186 SC STD MICRODIL/AGAR DIL: CPT | Performed by: UROLOGY

## 2024-10-07 PROCEDURE — 87077 CULTURE AEROBIC IDENTIFY: CPT | Performed by: UROLOGY

## 2024-10-07 PROCEDURE — 52000 CYSTOURETHROSCOPY: CPT | Performed by: UROLOGY

## 2024-10-07 RX ORDER — LIDOCAINE HYDROCHLORIDE 10 MG/ML
0.5 INJECTION, SOLUTION EPIDURAL; INFILTRATION; INTRACAUDAL; PERINEURAL ONCE AS NEEDED
Status: DISCONTINUED | OUTPATIENT
Start: 2024-10-07 | End: 2024-10-07 | Stop reason: HOSPADM

## 2024-10-07 RX ORDER — CEFAZOLIN SODIUM 2 G/50ML
2000 SOLUTION INTRAVENOUS
Status: COMPLETED | OUTPATIENT
Start: 2024-10-07 | End: 2024-10-07

## 2024-10-07 RX ORDER — HYDROMORPHONE HCL IN WATER/PF 6 MG/30 ML
0.2 PATIENT CONTROLLED ANALGESIA SYRINGE INTRAVENOUS
Status: DISCONTINUED | OUTPATIENT
Start: 2024-10-07 | End: 2024-10-07 | Stop reason: HOSPADM

## 2024-10-07 RX ORDER — ONDANSETRON 2 MG/ML
4 INJECTION INTRAMUSCULAR; INTRAVENOUS ONCE AS NEEDED
Status: DISCONTINUED | OUTPATIENT
Start: 2024-10-07 | End: 2024-10-07 | Stop reason: HOSPADM

## 2024-10-07 RX ORDER — FENTANYL CITRATE/PF 50 MCG/ML
25 SYRINGE (ML) INJECTION
Status: DISCONTINUED | OUTPATIENT
Start: 2024-10-07 | End: 2024-10-07 | Stop reason: HOSPADM

## 2024-10-07 RX ORDER — ACETAMINOPHEN 325 MG/1
650 TABLET ORAL EVERY 6 HOURS PRN
Status: DISCONTINUED | OUTPATIENT
Start: 2024-10-07 | End: 2024-10-07 | Stop reason: HOSPADM

## 2024-10-07 RX ORDER — FENTANYL CITRATE 50 UG/ML
INJECTION, SOLUTION INTRAMUSCULAR; INTRAVENOUS AS NEEDED
Status: DISCONTINUED | OUTPATIENT
Start: 2024-10-07 | End: 2024-10-07

## 2024-10-07 RX ORDER — SODIUM CHLORIDE, SODIUM LACTATE, POTASSIUM CHLORIDE, CALCIUM CHLORIDE 600; 310; 30; 20 MG/100ML; MG/100ML; MG/100ML; MG/100ML
125 INJECTION, SOLUTION INTRAVENOUS CONTINUOUS
Status: DISCONTINUED | OUTPATIENT
Start: 2024-10-07 | End: 2024-10-07 | Stop reason: HOSPADM

## 2024-10-07 RX ORDER — PROPOFOL 10 MG/ML
INJECTION, EMULSION INTRAVENOUS AS NEEDED
Status: DISCONTINUED | OUTPATIENT
Start: 2024-10-07 | End: 2024-10-07

## 2024-10-07 RX ORDER — PROPOFOL 10 MG/ML
INJECTION, EMULSION INTRAVENOUS CONTINUOUS PRN
Status: DISCONTINUED | OUTPATIENT
Start: 2024-10-07 | End: 2024-10-07

## 2024-10-07 RX ORDER — LIDOCAINE HYDROCHLORIDE 20 MG/ML
INJECTION, SOLUTION EPIDURAL; INFILTRATION; INTRACAUDAL; PERINEURAL AS NEEDED
Status: DISCONTINUED | OUTPATIENT
Start: 2024-10-07 | End: 2024-10-07

## 2024-10-07 RX ADMIN — FENTANYL CITRATE 25 MCG: 50 INJECTION INTRAMUSCULAR; INTRAVENOUS at 08:52

## 2024-10-07 RX ADMIN — CEFAZOLIN SODIUM 2000 MG: 2 SOLUTION INTRAVENOUS at 08:40

## 2024-10-07 RX ADMIN — FENTANYL CITRATE 50 MCG: 50 INJECTION INTRAMUSCULAR; INTRAVENOUS at 08:49

## 2024-10-07 RX ADMIN — PROPOFOL 30 MG: 10 INJECTION, EMULSION INTRAVENOUS at 09:00

## 2024-10-07 RX ADMIN — PROPOFOL 70 MCG/KG/MIN: 10 INJECTION, EMULSION INTRAVENOUS at 08:51

## 2024-10-07 RX ADMIN — FENTANYL CITRATE 25 MCG: 50 INJECTION INTRAMUSCULAR; INTRAVENOUS at 08:55

## 2024-10-07 RX ADMIN — PROPOFOL 50 MG: 10 INJECTION, EMULSION INTRAVENOUS at 08:49

## 2024-10-07 RX ADMIN — SODIUM CHLORIDE, SODIUM LACTATE, POTASSIUM CHLORIDE, AND CALCIUM CHLORIDE: .6; .31; .03; .02 INJECTION, SOLUTION INTRAVENOUS at 08:45

## 2024-10-07 RX ADMIN — LIDOCAINE HYDROCHLORIDE 80 MG: 20 INJECTION, SOLUTION EPIDURAL; INFILTRATION; INTRACAUDAL at 08:49

## 2024-10-07 NOTE — TELEPHONE ENCOUNTER
Patient had cystoscopy and TRUS with me in the operating room at [facility] on 10/7/2024    Can we have him follow-up with me within the next few weeks if possible for discussion of the findings during the procedure?    Additionally, he will need monthly nursing Raya catheter changes

## 2024-10-07 NOTE — DISCHARGE INSTR - AVS FIRST PAGE
Mr. Ray,        Your urethra had no blockages.  Your prostate was very large.  It is likely the cause of your difficulty with urination.    I did place a new Raya catheter at the end of your procedure.    I measured your prostate with the ultrasound probe at about 80 g.  This is quite large.  When I see you in the office, we will talk more about the surgical options that are at your disposal.    Please see postoperative instructions for details.    Please call office with any questions or concerns.        Sincerely,  Mian Rodriguez          You had cystoscopy done in the operating room today. This means that we looked inside your urethra and bladder with a camera.    We also placed a probe in your rectum to measure your prostate.  You may see some blood in your stool for the next few days.  This is normal as long as it does not persist.    You may see some blood in your urine for the next few days. This is normal. Please drink plenty of fluids. Call the office if you see large blood clots in your catheter tubing or if your catheter is not draining.    It may burn throughout your penis for the next few days. This is normal.    Please call the office if you have fevers or chills in the next few days.    The office will call you to set up a follow-up appointment with me to talk about surgical options.  The office will also arrange for a Raya catheter change for you in about 1 month.

## 2024-10-07 NOTE — INTERVAL H&P NOTE
H&P reviewed. After examining the patient I find no changes in the patients condition since the H&P had been written.    Vitals:    10/07/24 0820   BP: (!) 175/86   Pulse: 69   Resp: 16   Temp: (!) 96.9 °F (36.1 °C)   SpO2: 96%         Consented for cystoscopy, TRUS    Went over steps of procedure      Went over risks of procedure. Risks include not limited to: infection, bleeding, damage to nearby structures, more procedures, pain. Raya will replaced at end of procedure.

## 2024-10-07 NOTE — OP NOTE
OPERATIVE REPORT  PATIENT NAME: Estuardo Ray    :  1950  MRN: [id number]  Pt Location: AN OR ROOM 03    SURGERY DATE: 10/7/2024    Surgeons and Role:     * DO Tony Ballesteros Primary    Preop Diagnosis:  Urinary retention [R33.9]    Post-Op Diagnosis Codes:     * Urinary retention [R33.9]    Procedure(s):      Flexible cystourethroscopy  Transrectal ultrasound of the prostate        Specimen(s):  * No specimens in log *    Estimated Blood Loss:   Minimal    Drains:  Urethral Catheter Coude 18 Fr. (Active)   Number of days: 35       Urethral Catheter Latex 18 Fr. (Active)   Number of days: 0       Anesthesia Type:   Choice    Operative Indications:  Urinary retention [R33.9]      73-year-old male with urinary retention unable to undergo bladder outlet workup in the office due to issues with Raya catheter placement in the office.    Consented for OR cystoscopy, TRUS.        Operative Findings:      No medial stenosis  No urethral strictures  Enlarged prostate with bilateral lobar hypertrophy.  Kissing lobes.  Large median lobe with significant intravesical component.  Moderate to severe trabeculations about the prostate  Findings of catheter cystitis.the prostate  No bladder lesions  No bladder stones  Multiple cellules    18 Welsh Raya catheter placed.  10 cc in balloon placed.    Transrectal ultrasound demonstrated prostate measurements of 6.0 x 4.3 x 5.9 cm, 79.33 g      Complications:   None    Procedure and Technique:      Patient identified the preop holding area.  Consent was obtained.  Risks and benefits of the procedure were explained the patient.  Patient was in agreement.  Patient was brought back to the OR on the stretcher.  Bilateral lower extremity SCDs were placed and turned on.  IV Ancef was given for preoperative antibiotics.  Patient underwent smooth induction of sedation.  Areas prepped and draped in sterile fashion.  Timeout performed by our team.    Case began with  insertion of flexible cystoscope.  Pan cystourethroscopy was performed.  See above fines for details.  Bladder urine was collected for culture.    After the cystoscope was removed, new 18 [place] Raya catheter was placed using sterile technique.  Return of clear urine.  10 cc placed in balloon.    Patient was then turned on his side, left side down in lateral decubitus position.    Transrectal ultrasound probe was introduced.  Prostate was examined.  Measurements as stated above.    Transrectal ultrasound was then removed.    Patient was brought back to a supine position was uneventfully awoken from sedation.  He was brought to PACU in good condition.         A qualified resident physician was not available.    Patient Disposition:  PACU              SIGNATURE: Mian Rodriguez,   DATE: October 7, 2024  TIME: 9:06 AM          Plan  - Plan for outpatient follow-up to go over the transrectal ultrasound findings and the cystoscopy findings.  Likely will need HoLEP if he needs outlet procedure.  - Continue monthly catheter changes for  - Follow-up urine culture.  Will send additional antibiotics if need be.

## 2024-10-07 NOTE — ANESTHESIA POSTPROCEDURE EVALUATION
Post-Op Assessment Note    CV Status:  Stable  Pain Score: 0    Pain management: adequate       Mental Status:  Alert and awake   Hydration Status:  Euvolemic   PONV Controlled:  Controlled   Airway Patency:  Patent and adequate  Two or more mitigation strategies used for obstructive sleep apnea   Post Op Vitals Reviewed: Yes    No anethesia notable event occurred.    Staff: CRNA               BP   131/65   Temp   97.1   Pulse 58   Resp 20   SpO2 98

## 2024-10-08 NOTE — TELEPHONE ENCOUNTER
Patient was returning a call to the office. He is feeling good with no complains or questions.     He stated we have a great team at [facility]'s    Confirmed follow up with Michael 10/14 and nurse visit 11/5

## 2024-10-08 NOTE — TELEPHONE ENCOUNTER
This nurse attempted to call patient to do a follow up call post Cysto Trus and to confirm scheduled follow up appointments. Detailed voicemail left as per CC, office phone number provided instructing patient to call back to confirm appointments.     If patient calls back please confirm follow up with Dr. Rodriguez on 10/14/24. Thank you!

## 2024-10-09 DIAGNOSIS — N39.0 URINARY TRACT INFECTION WITHOUT HEMATURIA, SITE UNSPECIFIED: Primary | ICD-10-CM

## 2024-10-09 LAB
BACTERIA UR CULT: ABNORMAL
BACTERIA UR CULT: ABNORMAL

## 2024-10-09 RX ORDER — SULFAMETHOXAZOLE/TRIMETHOPRIM 800-160 MG
1 TABLET ORAL 2 TIMES DAILY
Qty: 14 TABLET | Refills: 0 | Status: SHIPPED | OUTPATIENT
Start: 2024-10-09 | End: 2024-10-16

## 2024-10-10 NOTE — PROGRESS NOTES
UROLOGY FOLLOW-UP ENCOUNTER    Estuardo Ray is a 73 y.o. male with BPH, urinary retention    No AC    Hx of ePSA s/p neg Pbx x2    Prostate MRI 3/5/2021: PI-RADS 3 lesion; 113 g prostate    Subsequent fusion guided biopsy in 2021 was negative    Started on Flomax, Avodart    PSA 9.12 on 4/11/2024    Patient was in retention for over 500 cc when he was seen in the office in September 2024.  He had near syncopal event at that time and needed to go to ED.  Due to his issues with the Raya catheter, it was decided that he would need the OR for his cystoscopy, TRUS.    OR 10/7/24:  No medial stenosis  No urethral strictures  Enlarged prostate with bilateral lobar hypertrophy.  Kissing lobes.  Large median lobe with significant intravesical component.  Moderate to severe trabeculations  Findings of catheter cystitis  No bladder lesions  No bladder stones  Multiple cellules    Transrectal ultrasound demonstrated prostate measurements of 6.0 x 4.3 x 5.9 cm, 79.33 g         Assessment and plan:     BPH, urinary retention    We reviewed the diagnosis of benign prostatic hyperplasia (BPH). I explained that the prostate is an organ that sits at the base of the bladder. As men urinate, the urine needs to pass through the channel of the prostate. I explained that as men age, the prostatic tissue tends to grow over time. This growth of prostatic tissue creates a smaller opening at the base of the bladder, causing bladder outlet obstruction (PORRAS). I explained that men with PORRAS can develop increased difficulty with urination. I explained that symptoms include but are not limited to: increased frequency of voiding, waking up at night in order to void, urinary straining, weak stream, dribbling, hesitancy, difficulty initiating stream, and blood in urine.    We discussed conservative management. We reviewed watchful waiting and lifestyle modifications to help with the symptoms of BPH. Common lifestyle modifications including  decrease caffeine, cutting back on PM fluids, and decreased alcohol intake were discussed. We reviewed the risks of monitoring symptoms without intervention. These risks include but are not limited to: bladder decompensation, development of Raya catheter dependence, and worsening renal function.  We also discussed that his symptoms may not progress over time.     We then discussed the option of medical management. We reviewed alpha blockers and/or 5-alpha reductase inhibitors. We discussed the common side effects of these medications.  I explained that studies have demonstrated that patients undergoing medical management may experience mild to moderate improvements in lower urinary tract symptoms based on IPSS scores. I explained that 5-LAVELL therapy is the only therapy that has shown the potential to alter the natural history of BPH and reduce the risk of progression to surgery. I also explained that combination pharmacotherapy (alpha blocker and 5-alpha reductase inhibitor) can be pursued and is typically the most effective.     Indications for surgical treatment include failure of medical management, chronic urinary retention/inability to pass more than one void trial, development of bladder stones, recurrent urinary tract infection secondary to BPH, intolerance of medical therapy, refractory hematuria secondary to BPH, or patient preference.    At this point, the patient has already tried and failed the following medical management:   Flomax, Avodart.    We then discussed surgical options for BPH.    Minimally invasive treatment (MIST) for bladder outlet obstruction include Urolift and Rezum.  Both treatments were explained in detail. Maria Del Carmen-procedural expectations of both procedures were described.  The biggest advantage of MIST for bladder outlet obstruction is the preservation of antegrade ejaculation.  The risk of retrograde ejaculation in patients receiving Urolift is rare.  A recent study showed that the risk  of retrograde ejaculation after Rezum can be as high as 25%, but still lower when compared to other bladder outlet procedures.  Patients may experience a mild to moderate improvement in lower urinary tract symptoms, similar to combination pharmacotherapy with MIST.  We discussed potential disadvantages of MIST including lack of long term data (> 5 year) as well as treatment failure requiring additional bladder outlet procedures over time. However, treatment failure would not preclude the patient from future salvage bladder outlet procedure.      Bladder outlet procedures that are considered definitive include TURP, laser enucleation (HOLEP, ThuLEP), Aquablation, robotic simple prostatectomy and open simple prostatectomy. I explained the prognosis and possible complications of each surgery.     For men with prostates >80 mL, bladder outlet procedures considered to be definitive include laser enucleation of the prostate, robotic simple prostatectomy, or open simple prostatectomy.  I explained that open simple prostatectomy is not commonly offered with the adoption of more minimally invasive treatment options. However, this treatment option may be offered under special circumstances.  Laser enucleation of the prostate offers a completely endoscopic approach (no incisions) to definitively remove prostate tissue obstructing the bladder with lower risk of bleeding complications compared to robotic simple prostatectomy.  Additional advantages of laser enucleation of the prostate include, very low risk of re-treatment over time, shorter hospital stay and in some cases no hospital stay required, shorter indwelling Raya catheter times, and the safety of this treatment modality in complex patients on blood thinning medication (ex. Eliquis, Xarelto, Coumadin).            Patient is interested in proceeding with Holmium laser enucleation based on our discussion today.      Laser enucleation of the prostate offers a completely  endoscopic approach (no incisions) to definitively remove prostate tissue obstructing the bladder with lower risk of bleeding complications compared to other endoscopic procedures, which is why I commonly offer Laser enucleation of the prostate.  Additional advantages of laser enucleation of the prostate include, very low risk of re-treatment over time, shorter hospital stay and in some cases no hospital stay required, shorter indwelling Raya catheter times, and the safety of this treatment modality in complex patients on blood thinning medication (ex. Eliquis, Xarelto, Coumadin).      I explained a HoLEP is a surgery performed with special equipment through the urethra to remove obstructing prostate tissue that is causing urinary symptoms.  Usually a Raya catheter will be placed for less than 24 hours after surgery. Some patients may require hospitalization overnight with continuous bladder irrigation through the Raya catheter.  It is common to have irritative voiding symptoms such as urinary urgency/frequency/nocturia for several weeks following a HoLEP; it is also common to see blood in the urine during this time period. There is a risk of retrograde ejaculation in up to 75% of patients after HoLEP. The risk of erectile dysfunction is rare following a HoLEP. The risk of transient stress urinary incontinence lasting 3-6 months following surgery is between 10-30% based on the literature.  The risk of long term urinary incontinence is rare, 1-2%.  Additional side effects that we discussed today include urethral stricture and bladder neck contracture, which occurs in 1-2% of patients. The risk of needing to make a cystotomy or a bladder incision to retrieve the enucleated tissue in the event that the morcellator does not work was discussed. Other reasons why a cystotomy would have to be made include bladder injury from irrigation fluid used during the procedure or bladder injury from placement of a Raya catheter.  "    I also explained to the patient that proper voiding requires open bladder outlet as well as proper underlying bladder function. He understands that even if we create a nice opening in the prostate after surgery, he still may not ideally void due to poor bladder function. He does understand that sometimes underlying bladder voiding mechanics can improve once a proper outlet opening is created, however, he also understands that this is not always the case. In summary the patient understands that even despite a proper HoLEP surgery, voiding issues may remain after the procedure.    We also discussed the possibility of finding prostate cancer pathology in the HoLEP specimen. I explained that all of the prostate tissue removed during the procedure gets examined by the pathology team, and there have been instances where prostate cancer is identified. I explained that while incidentally found prostate cancer is rare, it is usually low risk prostate cancer that can typically be monitored rather than aggressively treated. Should the patient have intermediate or high risk prostate cancer identified, he would have the risk based treatment options reviewed with him. I explained that in these scenarios, men are often treated with radiation.    The patient understands the risk and benefits of a HoLEP and would like to proceed with surgery.      I took time to answer any questions he had regarding the procedure.              PLAN  -Consented for HoLEP  -PATs  -Ucx   -RN visit already set up for 11/5/24 for Raya change          Patient's wife, Armin, present in office today and assisted with history    Portions of the above record have been created with voice recognition software.  Occasional wrong word or \"sound alike\" substitution may have occurred due to the inherent limitations of voice recognition software.  Read the chart carefully and recognize, using context, where substitution may have occurred.      Mian Dong " DO Michael        Chief Complaint     BPH, urinary retention    History of Present Illness     See summary above    No fevers or chills          The following portions of the patient's history were reviewed and updated as appropriate: allergies, current medications, past family history, past medical history, past social history, past surgical history and problem list.        AUA SYMPTOM SCORE      Flowsheet Row Most Recent Value   AUA SYMPTOM SCORE    How often have you had a sensation of not emptying your bladder completely after you finished urinating? 5 (P)     How often have you had to urinate again less than two hours after you finished urinating? 5 (P)     How often have you found you stopped and started again several times when you urinate? 1 (P)     How often have you found it difficult to postpone urination? 0 (P)     How often have you had a weak urinary stream? 5 (P)     How often have you had to push or strain to begin urination? 3 (P)     How many times did you most typically get up to urinate from the time you went to bed at night until the time you got up in the morning? 5 (P)     Quality of Life: If you were to spend the rest of your life with your urinary condition just the way it is now, how would you feel about that? 5 (P)     AUA SYMPTOM SCORE 24 (P)              Review of Systems     Review of Systems   Constitutional:  Negative for chills and fever.   Respiratory:  Negative for cough and shortness of breath.    Genitourinary:  Negative for dysuria and hematuria.   Neurological:  Negative for dizziness and headaches.   Psychiatric/Behavioral:  Negative for agitation and behavioral problems.        Allergies     No Known Allergies    Physical Exam     Physical Exam  Constitutional:       General: He is not in acute distress.  HENT:      Head: Normocephalic and atraumatic.   Pulmonary:      Effort: Pulmonary effort is normal. No respiratory distress.   Abdominal:      General: Abdomen is flat.  "     Palpations: Abdomen is soft.      Tenderness: There is no right CVA tenderness or left CVA tenderness.   Genitourinary:     Comments: Raya in place draining yellow urine  Skin:     General: Skin is warm and dry.   Neurological:      General: No focal deficit present.      Mental Status: He is alert and oriented to person, place, and time.   Psychiatric:         Mood and Affect: Mood normal.         Behavior: Behavior normal.             Vital Signs  Vitals:    10/14/24 0720   BP: 142/96   Pulse: 67   Temp: 97.5 °F (36.4 °C)   TempSrc: Temporal   SpO2: 94%   Weight: 75.8 kg (167 lb)   Height: 6' 1\" (1.854 m)         Current Medications       Current Outpatient Medications:     dutasteride (AVODART) 0.5 mg capsule, , Disp: , Rfl:     losartan-hydrochlorothiazide (HYZAAR) 50-12.5 mg per tablet, TAKE 1 TABLET BY MOUTH EVERY DAY, Disp: 90 tablet, Rfl: 1    pantoprazole (PROTONIX) 40 mg tablet, TAKE 1 TABLET BY MOUTH DAILY BEFORE BREAKFAST (Patient taking differently: Take 40 mg by mouth daily before breakfast Patient has not yet stated taking medication), Disp: 90 tablet, Rfl: 1    rosuvastatin (CRESTOR) 5 mg tablet, TAKE 1 TABLET (5 MG TOTAL) BY MOUTH DAILY., Disp: 90 tablet, Rfl: 1    sulfamethoxazole-trimethoprim (BACTRIM DS) 800-160 mg per tablet, Take 1 tablet by mouth 2 (two) times a day for 7 days, Disp: 14 tablet, Rfl: 0    tamsulosin (FLOMAX) 0.4 mg, Take 1 capsule (0.4 mg total) by mouth daily with dinner, Disp: 14 capsule, Rfl: 0    Active Problems     Patient Active Problem List   Diagnosis    Primary hypertension    Mixed hyperlipidemia    Prediabetes    History of colon polyps    Elevated PSA    Heartburn    Benign prostatic hyperplasia with urinary retention       Past Medical History     Past Medical History:   Diagnosis Date    Arthritis A couple years now    Cut back on sugar intake and it's better now. Mostly effecting my knees    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    " Colon polyp     GERD (gastroesophageal reflux disease) Armin 15 2023    Had it occasionally,  but it recently became worse and two antacids wouldn't relieve it. Started to take 1 Prilosec every other day and this relived it.    Hemorrhoids     Hypertension     Inflamed seborrheic keratosis     Multiple benign polyps of large intestine     History of personal colonic polyps    Neoplasm of uncertain behavior of skin     Sebaceous cyst        Surgical History     Past Surgical History:   Procedure Laterality Date    COLONOSCOPY      HAND SURGERY Left     thumb ligament repair    INGUINAL HERNIA REPAIR      KNEE ARTHROSCOPY Left     AK CYSTOURETHROSCOPY N/A 10/7/2024    Procedure: CYSTOSCOPY, TRUS;  Surgeon: Mian Rodriguez DO;  Location: [facility];  Service: Urology    TONSILLECTOMY      US GUIDED INTRAOPERATIVE  10/7/2024         Family History     Family History   Problem Relation Age of Onset    Stroke Mother         stroke syndrome    Other Father         hematologic disorder       Social History     Social History     Social History     Tobacco Use   Smoking Status Former    Current packs/day: 0.00    Average packs/day: 0.5 packs/day for 5.0 years (2.5 ttl pk-yrs)    Types: Cigarettes    Start date:     Quit date:     Years since quittin.8   Smokeless Tobacco Never       Pertinent Lab Values     Lab Results   Component Value Date    CREATININE 0.98 10/03/2024       Lab Results   Component Value Date    PSA 9.12 (H) 2024    PSA 7.0 (H) 2023    PSA 9.5 (H) 09/15/2022               Pertinent Imaging     N/A      Pertinent Pathology     N/A          I have spent 40 minutes with Patient and family today in which greater than 50% of this time was spent in counseling/coordination of care regarding Diagnostic results, Prognosis, Risks and benefits of tx options, Instructions for management, Patient and family education, Importance of tx compliance, Impressions, Counseling / Coordination of  care, Documenting in the medical record, Reviewing / ordering tests, medicine, procedures  , Obtaining or reviewing history  , and Communicating with other healthcare professionals .    Please note this time includes cumulative time on the day of encounter, including reviewing medical records and/or coordinating care among the patient's other specialists.

## 2024-10-14 ENCOUNTER — OFFICE VISIT (OUTPATIENT)
Dept: UROLOGY | Facility: CLINIC | Age: 74
End: 2024-10-14
Payer: COMMERCIAL

## 2024-10-14 VITALS
HEART RATE: 67 BPM | BODY MASS INDEX: 22.13 KG/M2 | SYSTOLIC BLOOD PRESSURE: 142 MMHG | OXYGEN SATURATION: 94 % | WEIGHT: 167 LBS | TEMPERATURE: 97.5 F | HEIGHT: 73 IN | DIASTOLIC BLOOD PRESSURE: 96 MMHG

## 2024-10-14 DIAGNOSIS — N40.1 BENIGN PROSTATIC HYPERPLASIA WITH URINARY RETENTION: Primary | ICD-10-CM

## 2024-10-14 DIAGNOSIS — R33.8 BENIGN PROSTATIC HYPERPLASIA WITH URINARY RETENTION: Primary | ICD-10-CM

## 2024-10-14 PROCEDURE — 99215 OFFICE O/P EST HI 40 MIN: CPT | Performed by: UROLOGY

## 2024-10-14 RX ORDER — SODIUM CHLORIDE, SODIUM LACTATE, POTASSIUM CHLORIDE, CALCIUM CHLORIDE 600; 310; 30; 20 MG/100ML; MG/100ML; MG/100ML; MG/100ML
75 INJECTION, SOLUTION INTRAVENOUS CONTINUOUS
OUTPATIENT
Start: 2024-10-14

## 2024-10-15 ENCOUNTER — PREP FOR PROCEDURE (OUTPATIENT)
Dept: UROLOGY | Facility: CLINIC | Age: 74
End: 2024-10-15

## 2024-10-15 ENCOUNTER — APPOINTMENT (OUTPATIENT)
Dept: LAB | Facility: HOSPITAL | Age: 74
End: 2024-10-15
Payer: COMMERCIAL

## 2024-10-15 ENCOUNTER — TELEPHONE (OUTPATIENT)
Dept: UROLOGY | Facility: CLINIC | Age: 74
End: 2024-10-15

## 2024-10-15 DIAGNOSIS — Z01.810 PRE-OPERATIVE CARDIOVASCULAR EXAMINATION: Primary | ICD-10-CM

## 2024-10-15 DIAGNOSIS — N40.1 BENIGN PROSTATIC HYPERPLASIA WITH URINARY RETENTION: ICD-10-CM

## 2024-10-15 DIAGNOSIS — R39.89 SUSPECTED UTI: ICD-10-CM

## 2024-10-15 DIAGNOSIS — R33.8 BENIGN PROSTATIC HYPERPLASIA WITH URINARY RETENTION: ICD-10-CM

## 2024-10-15 DIAGNOSIS — E78.2 MIXED HYPERLIPIDEMIA: ICD-10-CM

## 2024-10-15 DIAGNOSIS — Z01.812 PRE-OPERATIVE LABORATORY EXAMINATION: ICD-10-CM

## 2024-10-15 LAB
CHOLEST SERPL-MCNC: 130 MG/DL
HDLC SERPL-MCNC: 52 MG/DL
LDLC SERPL CALC-MCNC: 60 MG/DL (ref 0–100)
TRIGL SERPL-MCNC: 91 MG/DL

## 2024-10-15 PROCEDURE — 80061 LIPID PANEL: CPT

## 2024-10-15 PROCEDURE — 36415 COLL VENOUS BLD VENIPUNCTURE: CPT

## 2024-10-15 NOTE — TELEPHONE ENCOUNTER
Pt returning call to . Pt is requesting a call back to schedule surgery.    Pt call back- [phone]

## 2024-10-15 NOTE — TELEPHONE ENCOUNTER
Spoke with patient and confirmed surgery date of:11/15/24  Type of surgery:Holep  Operating physician: Dr. Rodriguez  [facility]     Verbally went over prep with patient on: 10/14/24  NPO  Bowel prep? No  Hospital calls afternoon prior with arrival time -Calls Friday afternoon for Monday surgeries  Patient needs ride to and from surgery (outpatient/inpatient)   Pre-op testing to be done 2 weeks prior to surgery  Cbc bmp t/s EKG u/c  Blood thinners:   Pat will call a week prior   Clearances needed: none    Mailed/emailed to patient on:10/14/24  Copy of packet scanned into Media on:10/14/24  Labs in packet  Soap / Bowel prep in packet  Pre-op & Post-op in packet  Dates of H&P and post-op if needed    Consent: in Media

## 2024-10-22 ENCOUNTER — OFFICE VISIT (OUTPATIENT)
Age: 74
End: 2024-10-22
Payer: COMMERCIAL

## 2024-10-22 VITALS
HEIGHT: 73 IN | DIASTOLIC BLOOD PRESSURE: 76 MMHG | RESPIRATION RATE: 18 BRPM | WEIGHT: 166.6 LBS | SYSTOLIC BLOOD PRESSURE: 134 MMHG | OXYGEN SATURATION: 97 % | HEART RATE: 66 BPM | TEMPERATURE: 96.7 F | BODY MASS INDEX: 22.08 KG/M2

## 2024-10-22 DIAGNOSIS — I10 PRIMARY HYPERTENSION: Primary | Chronic | ICD-10-CM

## 2024-10-22 DIAGNOSIS — Z00.00 MEDICARE ANNUAL WELLNESS VISIT, SUBSEQUENT: ICD-10-CM

## 2024-10-22 DIAGNOSIS — E78.2 MIXED HYPERLIPIDEMIA: ICD-10-CM

## 2024-10-22 DIAGNOSIS — R73.03 PREDIABETES: ICD-10-CM

## 2024-10-22 DIAGNOSIS — R33.8 BENIGN PROSTATIC HYPERPLASIA WITH URINARY RETENTION: ICD-10-CM

## 2024-10-22 DIAGNOSIS — N40.1 BENIGN PROSTATIC HYPERPLASIA WITH URINARY RETENTION: ICD-10-CM

## 2024-10-22 DIAGNOSIS — Z23 ENCOUNTER FOR IMMUNIZATION: ICD-10-CM

## 2024-10-22 PROCEDURE — 99214 OFFICE O/P EST MOD 30 MIN: CPT | Performed by: INTERNAL MEDICINE

## 2024-10-22 PROCEDURE — G0439 PPPS, SUBSEQ VISIT: HCPCS | Performed by: INTERNAL MEDICINE

## 2024-10-22 PROCEDURE — G0008 ADMIN INFLUENZA VIRUS VAC: HCPCS | Performed by: INTERNAL MEDICINE

## 2024-10-22 PROCEDURE — 90662 IIV NO PRSV INCREASED AG IM: CPT | Performed by: INTERNAL MEDICINE

## 2024-10-22 RX ORDER — ROSUVASTATIN CALCIUM 5 MG/1
5 TABLET, COATED ORAL DAILY
Qty: 90 TABLET | Refills: 3 | Status: SHIPPED | OUTPATIENT
Start: 2024-10-22

## 2024-10-22 NOTE — ASSESSMENT & PLAN NOTE
Most recent A1c was 6.1 % on 4/11/2024. Will monitor A1c levels. Watch carbohydrate intake.    Orders:    Hemoglobin A1C; Future

## 2024-10-22 NOTE — PATIENT INSTRUCTIONS
Medicare Preventive Visit Patient Instructions  Thank you for completing your Welcome to Medicare Visit or Medicare Annual Wellness Visit today. Your next wellness visit will be due in one year (10/23/2025).  The screening/preventive services that you may require over the next 5-10 years are detailed below. Some tests may not apply to you based off risk factors and/or age. Screening tests ordered at today's visit but not completed yet may show as past due. Also, please note that scanned in results may not display below.  Preventive Screenings:  Service Recommendations Previous Testing/Comments   Colorectal Cancer Screening  Colonoscopy    Fecal Occult Blood Test (FOBT)/Fecal Immunochemical Test (FIT)  Fecal DNA/Cologuard Test  Flexible Sigmoidoscopy Age: 45-75 years old   Colonoscopy: every 10 years (May be performed more frequently if at higher risk)  OR  FOBT/FIT: every 1 year  OR  Cologuard: every 3 years  OR  Sigmoidoscopy: every 5 years  Screening may be recommended earlier than age 45 if at higher risk for colorectal cancer. Also, an individualized decision between you and your healthcare provider will decide whether screening between the ages of 76-85 would be appropriate. Colonoscopy: 10/30/2023  FOBT/FIT: Not on file  Cologuard: Not on file  Sigmoidoscopy: Not on file    Screening Current     Prostate Cancer Screening Individualized decision between patient and health care provider in men between ages of 55-69   Medicare will cover every 12 months beginning on the day after your 50th birthday PSA: 9.12 ng/mL     Screening Current     Hepatitis C Screening Once for adults born between 1945 and 1965  More frequently in patients at high risk for Hepatitis C Hep C Antibody: 09/01/2020    Screening Current   Diabetes Screening 1-2 times per year if you're at risk for diabetes or have pre-diabetes Fasting glucose: 107 mg/dL (4/11/2024)  A1C: 6.1 % (4/11/2024)  Screening Current   Cholesterol Screening Once every  5 years if you don't have a lipid disorder. May order more often based on risk factors. Lipid panel: 10/15/2024  Screening Not Indicated  History Lipid Disorder      Other Preventive Screenings Covered by Medicare:  Abdominal Aortic Aneurysm (AAA) Screening: covered once if your at risk. You're considered to be at risk if you have a family history of AAA or a male between the age of 65-75 who smoking at least 100 cigarettes in your lifetime.  Lung Cancer Screening: covers low dose CT scan once per year if you meet all of the following conditions: (1) Age 55-77; (2) No signs or symptoms of lung cancer; (3) Current smoker or have quit smoking within the last 15 years; (4) You have a tobacco smoking history of at least 20 pack years (packs per day x number of years you smoked); (5) You get a written order from a healthcare provider.  Glaucoma Screening: covered annually if you're considered high risk: (1) You have diabetes OR (2) Family history of glaucoma OR (3)  aged 50 and older OR (4)  American aged 65 and older  Osteoporosis Screening: covered every 2 years if you meet one of the following conditions: (1) Have a vertebral abnormality; (2) On glucocorticoid therapy for more than 3 months; (3) Have primary hyperparathyroidism; (4) On osteoporosis medications and need to assess response to drug therapy.  HIV Screening: covered annually if you're between the age of 15-65. Also covered annually if you are younger than 15 and older than 65 with risk factors for HIV infection. For pregnant patients, it is covered up to 3 times per pregnancy.    Immunizations:  Immunization Recommendations   Influenza Vaccine Annual influenza vaccination during flu season is recommended for all persons aged >= 6 months who do not have contraindications   Pneumococcal Vaccine   * Pneumococcal conjugate vaccine = PCV13 (Prevnar 13), PCV15 (Vaxneuvance), PCV20 (Prevnar 20)  * Pneumococcal polysaccharide vaccine =  PPSV23 (Pneumovax) Adults 19-63 yo with certain risk factors or if 65+ yo  If never received any pneumonia vaccine: recommend Prevnar 20 (PCV20)  Give PCV20 if previously received 1 dose of PCV13 or PPSV23   Hepatitis B Vaccine 3 dose series if at intermediate or high risk (ex: diabetes, end stage renal disease, liver disease)   Respiratory syncytial virus (RSV) Vaccine - COVERED BY MEDICARE PART D  * RSVPreF3 (Arexvy) CDC recommends that adults 60 years of age and older may receive a single dose of RSV vaccine using shared clinical decision-making (SCDM)   Tetanus (Td) Vaccine - COST NOT COVERED BY MEDICARE PART B Following completion of primary series, a booster dose should be given every 10 years to maintain immunity against tetanus. Td may also be given as tetanus wound prophylaxis.   Tdap Vaccine - COST NOT COVERED BY MEDICARE PART B Recommended at least once for all adults. For pregnant patients, recommended with each pregnancy.   Shingles Vaccine (Shingrix) - COST NOT COVERED BY MEDICARE PART B  2 shot series recommended in those 19 years and older who have or will have weakened immune systems or those 50 years and older     Health Maintenance Due:      Topic Date Due   • Colorectal Cancer Screening  10/28/2028   • Hepatitis C Screening  Completed     Immunizations Due:      Topic Date Due   • Influenza Vaccine (1) 09/01/2024   • COVID-19 Vaccine (5 - 2023-24 season) 09/01/2024     Advance Directives   What are advance directives?  Advance directives are legal documents that state your wishes and plans for medical care. These plans are made ahead of time in case you lose your ability to make decisions for yourself. Advance directives can apply to any medical decision, such as the treatments you want, and if you want to donate organs.   What are the types of advance directives?  There are many types of advance directives, and each state has rules about how to use them. You may choose a combination of any of  the following:  Living will:  This is a written record of the treatment you want. You can also choose which treatments you do not want, which to limit, and which to stop at a certain time. This includes surgery, medicine, IV fluid, and tube feedings.   Durable power of  for healthcare (DPAHC):  This is a written record that states who you want to make healthcare choices for you when you are unable to make them for yourself. This person, called a proxy, is usually a family member or a friend. You may choose more than 1 proxy.  Do not resuscitate (DNR) order:  A DNR order is used in case your heart stops beating or you stop breathing. It is a request not to have certain forms of treatment, such as CPR. A DNR order may be included in other types of advance directives.  Medical directive:  This covers the care that you want if you are in a coma, near death, or unable to make decisions for yourself. You can list the treatments you want for each condition. Treatment may include pain medicine, surgery, blood transfusions, dialysis, IV or tube feedings, and a ventilator (breathing machine).  Values history:  This document has questions about your views, beliefs, and how you feel and think about life. This information can help others choose the care that you would choose.  Why are advance directives important?  An advance directive helps you control your care. Although spoken wishes may be used, it is better to have your wishes written down. Spoken wishes can be misunderstood, or not followed. Treatments may be given even if you do not want them. An advance directive may make it easier for your family to make difficult choices about your care.       © Copyright [employer] 2018 Information is for End User's use only and may not be sold, redistributed or otherwise used for commercial purposes. All illustrations and images included in CareNotes® are the copyrighted property of [employer] Inc[employer] or [employer]

## 2024-10-22 NOTE — ASSESSMENT & PLAN NOTE
Cholesterol is well controlled. Continue statin.    Orders:    rosuvastatin (CRESTOR) 5 mg tablet; Take 1 tablet (5 mg total) by mouth daily    Basic metabolic panel; Future    Lipid Panel with Direct LDL reflex; Future

## 2024-10-22 NOTE — ASSESSMENT & PLAN NOTE
S/p perez catheter. Recently treated for UTI. Has perez exchange scheduled in beginning of November. Then he has laser enucleation of the prostate scheduled with Dr. Rodriguez which show improve his symptom of urinary retention. No contraindications to proce

## 2024-10-22 NOTE — PROGRESS NOTES
Ambulatory Visit  Name: Estuardo Ray      : 1950      MRN: [id number]  Encounter Provider: Ilya Garcia DO  Encounter Date: 10/22/2024   Encounter department: [facility] PRIMARY [facility] [place]    Assessment & Plan  Primary hypertension    Blood pressure is controlled. Continue current anti-HTN therapy.    Prediabetes    Most recent A1c was 6.1 % on 2024. Will monitor A1c levels. Watch carbohydrate intake.    Orders:    Hemoglobin A1C; Future    Mixed hyperlipidemia    Cholesterol is well controlled. Continue statin.    Orders:    rosuvastatin (CRESTOR) 5 mg tablet; Take 1 tablet (5 mg total) by mouth daily    Basic metabolic panel; Future    Lipid Panel with Direct LDL reflex; Future    Benign prostatic hyperplasia with urinary retention    S/p perez catheter. Recently treated for UTI. Has perez exchange scheduled in beginning of November. Then he has laser enucleation of the prostate scheduled with Dr. Rodriguez which show improve his symptom of urinary retention. No contraindications to proce    Medicare annual wellness visit, subsequent      Depression Screening and Follow-up Plan: Patient was screened for depression during today's encounter. They screened negative with a PHQ-2 score of 0.      Preventive health issues were discussed with patient, and age appropriate screening tests were ordered as noted in patient's After Visit Summary. Personalized health advice and appropriate referrals for health education or preventive services given if needed, as noted in patient's After Visit Summary.    History of Present Illness     Estuardo presents for follow-up and medicare wellness visit. He has been dealing with BPH complicated by urinary retention. He has a perez catheter in place. He is scheduled for laser enucleation of the prostate on 11/15/2024 with Dr. Rodriguez.  Patient Care Team:  Ilya Garcia DO as PCP - General (Internal Medicine)  Ilya Garcia DO as PCP - PCP-[facility]  (RT[facility])  Iker Whitaker MD (Urology)    Review of Systems   Constitutional: Negative.    Respiratory: Negative.     Cardiovascular: Negative.    Gastrointestinal: Negative.    Genitourinary:  Positive for difficulty urinating.     Medical History Reviewed by provider this encounter:  Tobacco  Allergies  Meds  Problems  Med Hx  Surg Hx  Fam Hx       Annual Wellness Visit Questionnaire   Estuardo is here for his Subsequent Wellness visit. Last Medicare Wellness visit information reviewed, patient interviewed and updates made to the record today.      Health Risk Assessment:   Patient rates overall health as very good. Patient feels that their physical health rating is same. Patient is very satisfied with their life. Eyesight was rated as same. Hearing was rated as same. Patient feels that their emotional and mental health rating is same. Patients states they are never, rarely angry. Patient states they are never, rarely unusually tired/fatigued. Pain experienced in the last 7 days has been none. Patient states that he has experienced no weight loss or gain in last 6 months.     Depression Screening:   PHQ-2 Score: 0      Fall Risk Screening:   In the past year, patient has experienced: no history of falling in past year      Home Safety:  Patient does not have trouble with stairs inside or outside of their home. Patient has working smoke alarms and has working carbon monoxide detector. Home safety hazards include: none.     Nutrition:   Current diet is Regular, No Added Salt and Limited junk food.     Medications:   Patient is not currently taking any over-the-counter supplements. Patient is able to manage medications.     Activities of Daily Living (ADLs)/Instrumental Activities of Daily Living (IADLs):   Walk and transfer into and out of bed and chair?: Yes  Dress and groom yourself?: Yes    Bathe or shower yourself?: Yes    Feed yourself? Yes  Do your laundry/housekeeping?: Yes  Manage your money, pay your  bills and track your expenses?: Yes  Make your own meals?: Yes    Do your own shopping?: Yes    Durable Medical Equipment Suppliers  N/A    Previous Hospitalizations:   Any hospitalizations or ED visits within the last 12 months?: Yes    How many hospitalizations have you had in the last year?: 1-2    Hospitalization Comments: Just a visit in the [facility] and [facility] for a 1 hr.  procedure.    Advance Care Planning:   Living will: Yes    Durable POA for healthcare: Yes    Advanced directive: Yes    Five wishes given: No      Cognitive Screening:   Provider or family/friend/caregiver concerned regarding cognition?: No    PREVENTIVE SCREENINGS      Cardiovascular Screening:    General: Screening Not Indicated and History Lipid Disorder      Diabetes Screening:     General: Screening Current      Colorectal Cancer Screening:     General: Screening Current      Prostate Cancer Screening:    General: Screening Current      Osteoporosis Screening:    General: Screening Not Indicated      Abdominal Aortic Aneurysm (AAA) Screening:    Risk factors include: age between 65-76 yo and tobacco use        General: Screening Current      Lung Cancer Screening:     General: Screening Not Indicated      Hepatitis C Screening:    General: Screening Current    Screening, Brief Intervention, and Referral to Treatment (SBIRT)    Screening  Typical number of drinks in a day: 0  Typical number of drinks in a week: 0  Interpretation: Low risk drinking behavior.    AUDIT-C Screenin) How often did you have a drink containing alcohol in the past year? 2 to 4 times a month  2) How many drinks did you have on a typical day when you were drinking in the past year? 0  3) How often did you have 6 or more drinks on one occasion in the past year? never    AUDIT-C Score: 2  Interpretation: Score 0-3 (male): Negative screen for alcohol misuse    Single Item Drug Screening:  How often have you used an illegal drug (including marijuana)  "or a prescription medication for non-medical reasons in the past year? never    Single Item Drug Screen Score: 0  Interpretation: Negative screen for possible drug use disorder    Brief Intervention  Alcohol & drug use screenings were reviewed. No concerns regarding substance use disorder identified.     Other Counseling Topics:   Car/seat belt/driving safety, skin self-exam, sunscreen and regular weightbearing exercise.     Social Determinants of Health     Financial Resource Strain: Low Risk  (10/10/2023)    Overall Financial Resource Strain (CARDIA)     Difficulty of Paying Living Expenses: Not hard at all   Food Insecurity: No Food Insecurity (10/22/2024)    Hunger Vital Sign     Worried About Running Out of Food in the Last Year: Never true     Ran Out of Food in the Last Year: Never true   Transportation Needs: No Transportation Needs (10/22/2024)    PRAPARE - Transportation     Lack of Transportation (Medical): No     Lack of Transportation (Non-Medical): No   Housing Stability: Low Risk  (10/22/2024)    Housing Stability Vital Sign     Unable to Pay for Housing in the Last Year: No     Number of Times Moved in the Last Year: 0     Homeless in the Last Year: No   Utilities: Not At Risk (10/22/2024)    [facility] Utilities     Threatened with loss of utilities: No     Objective     /76 (BP Location: Left arm, Patient Position: Sitting, Cuff Size: Standard)   Pulse 66   Temp (!) 96.7 °F (35.9 °C) (Tympanic)   Resp 18   Ht 6' 1\" (1.854 m)   Wt 75.6 kg (166 lb 9.6 oz)   SpO2 97%   BMI 21.98 kg/m²     Physical Exam  Constitutional:       General: He is not in acute distress.     Appearance: He is not ill-appearing.   Cardiovascular:      Rate and Rhythm: Normal rate and regular rhythm.      Heart sounds: No murmur heard.  Pulmonary:      Effort: Pulmonary effort is normal. No respiratory distress.      Breath sounds: No wheezing.   Abdominal:      General: Bowel sounds are normal. There is no distension.     "  Tenderness: There is no abdominal tenderness.   Musculoskeletal:      Right lower leg: No edema.      Left lower leg: No edema.   Neurological:      Mental Status: He is alert.       Ilya Garcia,

## 2024-11-01 ENCOUNTER — APPOINTMENT (OUTPATIENT)
Dept: LAB | Facility: HOSPITAL | Age: 74
End: 2024-11-01
Payer: COMMERCIAL

## 2024-11-01 ENCOUNTER — LAB REQUISITION (OUTPATIENT)
Dept: LAB | Facility: HOSPITAL | Age: 74
End: 2024-11-01
Payer: COMMERCIAL

## 2024-11-01 ENCOUNTER — OFFICE VISIT (OUTPATIENT)
Dept: LAB | Facility: HOSPITAL | Age: 74
End: 2024-11-01
Attending: UROLOGY
Payer: COMMERCIAL

## 2024-11-01 DIAGNOSIS — Z01.812 PRE-OPERATIVE LABORATORY EXAMINATION: ICD-10-CM

## 2024-11-01 DIAGNOSIS — R33.8 OTHER RETENTION OF URINE: ICD-10-CM

## 2024-11-01 DIAGNOSIS — Z01.810 PRE-OPERATIVE CARDIOVASCULAR EXAMINATION: ICD-10-CM

## 2024-11-01 DIAGNOSIS — N40.1 BENIGN PROSTATIC HYPERPLASIA WITH URINARY RETENTION: ICD-10-CM

## 2024-11-01 DIAGNOSIS — R33.8 BENIGN PROSTATIC HYPERPLASIA WITH URINARY RETENTION: ICD-10-CM

## 2024-11-01 DIAGNOSIS — R39.89 SUSPECTED UTI: ICD-10-CM

## 2024-11-01 DIAGNOSIS — N40.1 BENIGN PROSTATIC HYPERPLASIA WITH LOWER URINARY TRACT SYMPTOMS: ICD-10-CM

## 2024-11-01 DIAGNOSIS — Z01.818 PREOPERATIVE EXAMINATION: ICD-10-CM

## 2024-11-01 LAB
ABO GROUP BLD: NORMAL
ALBUMIN SERPL BCG-MCNC: 4.2 G/DL (ref 3.5–5)
ALP SERPL-CCNC: 73 U/L (ref 34–104)
ALT SERPL W P-5'-P-CCNC: 19 U/L (ref 7–52)
ANION GAP SERPL CALCULATED.3IONS-SCNC: 4 MMOL/L (ref 4–13)
AST SERPL W P-5'-P-CCNC: 16 U/L (ref 13–39)
ATRIAL RATE: 55 BPM
BASOPHILS # BLD AUTO: 0.03 THOUSANDS/ΜL (ref 0–0.1)
BASOPHILS NFR BLD AUTO: 0 % (ref 0–1)
BILIRUB SERPL-MCNC: 1.05 MG/DL (ref 0.2–1)
BLD GP AB SCN SERPL QL: NEGATIVE
BUN SERPL-MCNC: 19 MG/DL (ref 5–25)
CALCIUM SERPL-MCNC: 9.4 MG/DL (ref 8.4–10.2)
CHLORIDE SERPL-SCNC: 101 MMOL/L (ref 96–108)
CO2 SERPL-SCNC: 34 MMOL/L (ref 21–32)
CREAT SERPL-MCNC: 1.14 MG/DL (ref 0.6–1.3)
EOSINOPHIL # BLD AUTO: 0.13 THOUSAND/ΜL (ref 0–0.61)
EOSINOPHIL NFR BLD AUTO: 2 % (ref 0–6)
ERYTHROCYTE [DISTWIDTH] IN BLOOD BY AUTOMATED COUNT: 13.3 % (ref 11.6–15.1)
GFR SERPL CREATININE-BSD FRML MDRD: 63 ML/MIN/1.73SQ M
GLUCOSE P FAST SERPL-MCNC: 103 MG/DL (ref 65–99)
HCT VFR BLD AUTO: 45.3 % (ref 36.5–49.3)
HGB BLD-MCNC: 14.9 G/DL (ref 12–17)
IMM GRANULOCYTES # BLD AUTO: 0.01 THOUSAND/UL (ref 0–0.2)
IMM GRANULOCYTES NFR BLD AUTO: 0 % (ref 0–2)
LYMPHOCYTES # BLD AUTO: 1.41 THOUSANDS/ΜL (ref 0.6–4.47)
LYMPHOCYTES NFR BLD AUTO: 19 % (ref 14–44)
MCH RBC QN AUTO: 28.8 PG (ref 26.8–34.3)
MCHC RBC AUTO-ENTMCNC: 32.9 G/DL (ref 31.4–37.4)
MCV RBC AUTO: 88 FL (ref 82–98)
MONOCYTES # BLD AUTO: 0.51 THOUSAND/ΜL (ref 0.17–1.22)
MONOCYTES NFR BLD AUTO: 7 % (ref 4–12)
NEUTROPHILS # BLD AUTO: 5.23 THOUSANDS/ΜL (ref 1.85–7.62)
NEUTS SEG NFR BLD AUTO: 72 % (ref 43–75)
NRBC BLD AUTO-RTO: 0 /100 WBCS
P AXIS: 19 DEGREES
PLATELET # BLD AUTO: 255 THOUSANDS/UL (ref 149–390)
PMV BLD AUTO: 8.5 FL (ref 8.9–12.7)
POTASSIUM SERPL-SCNC: 3.8 MMOL/L (ref 3.5–5.3)
PR INTERVAL: 150 MS
PROT SERPL-MCNC: 7.3 G/DL (ref 6.4–8.4)
QRS AXIS: -10 DEGREES
QRSD INTERVAL: 74 MS
QT INTERVAL: 420 MS
QTC INTERVAL: 401 MS
RBC # BLD AUTO: 5.17 MILLION/UL (ref 3.88–5.62)
RH BLD: POSITIVE
SODIUM SERPL-SCNC: 139 MMOL/L (ref 135–147)
SPECIMEN EXPIRATION DATE: NORMAL
T WAVE AXIS: 18 DEGREES
VENTRICULAR RATE: 55 BPM
WBC # BLD AUTO: 7.32 THOUSAND/UL (ref 4.31–10.16)

## 2024-11-01 PROCEDURE — 86850 RBC ANTIBODY SCREEN: CPT | Performed by: UROLOGY

## 2024-11-01 PROCEDURE — 80053 COMPREHEN METABOLIC PANEL: CPT

## 2024-11-01 PROCEDURE — 85025 COMPLETE CBC W/AUTO DIFF WBC: CPT

## 2024-11-01 PROCEDURE — 87077 CULTURE AEROBIC IDENTIFY: CPT

## 2024-11-01 PROCEDURE — 86901 BLOOD TYPING SEROLOGIC RH(D): CPT | Performed by: UROLOGY

## 2024-11-01 PROCEDURE — 36415 COLL VENOUS BLD VENIPUNCTURE: CPT

## 2024-11-01 PROCEDURE — 93005 ELECTROCARDIOGRAM TRACING: CPT

## 2024-11-01 PROCEDURE — 87186 SC STD MICRODIL/AGAR DIL: CPT

## 2024-11-01 PROCEDURE — 87086 URINE CULTURE/COLONY COUNT: CPT

## 2024-11-01 PROCEDURE — 86900 BLOOD TYPING SEROLOGIC ABO: CPT | Performed by: UROLOGY

## 2024-11-01 PROCEDURE — 93010 ELECTROCARDIOGRAM REPORT: CPT | Performed by: STUDENT IN AN ORGANIZED HEALTH CARE EDUCATION/TRAINING PROGRAM

## 2024-11-03 LAB
BACTERIA UR CULT: ABNORMAL
BACTERIA UR CULT: ABNORMAL

## 2024-11-04 ENCOUNTER — PREP FOR PROCEDURE (OUTPATIENT)
Dept: UROLOGY | Facility: CLINIC | Age: 74
End: 2024-11-04

## 2024-11-04 ENCOUNTER — TELEPHONE (OUTPATIENT)
Dept: UROLOGY | Facility: CLINIC | Age: 74
End: 2024-11-04

## 2024-11-04 DIAGNOSIS — N39.0 URINARY TRACT INFECTION WITHOUT HEMATURIA, SITE UNSPECIFIED: Primary | ICD-10-CM

## 2024-11-04 RX ORDER — DOXYCYCLINE 100 MG/1
100 CAPSULE ORAL EVERY 12 HOURS SCHEDULED
Qty: 14 CAPSULE | Refills: 0 | Status: SHIPPED | OUTPATIENT
Start: 2024-11-04 | End: 2024-11-11

## 2024-11-04 NOTE — TELEPHONE ENCOUNTER
Patient scheduled for OR with me on 11/15/2024    Preoperative urine culture resulted as below      7 days of doxycycline sent to pharmacy    Can someone please call patient and ask him to start the antibiotic tomorrow?

## 2024-11-04 NOTE — TELEPHONE ENCOUNTER
Spoke with pt wife relaying Dedra ALVARADO message. Pt verbalized understanding. Confirmed pt is suppose to take abx tomorrow. No further assistance

## 2024-11-04 NOTE — TELEPHONE ENCOUNTER
Please advise     Urine culture  Order: [id number]   Status: Final result       Visible to patient: Yes (not seen)       Next appt: 11/05/2024 at 10:30 AM in Urology (UROLOGY NURSE EBENEZ[facility])       Dx: Suspected UTI; Benign prostatic hyper...    Specimen Information: Urine, Indwelling Raya Catheter   0 Result Notes  Urine Culture 60,000-69,000 cfu/ml Staphylococcus epidermidis Abnormal       50,000-59,000 cfu/ml Pseudomonas putida group Abnormal            Susceptibility     Staphylococcus epidermidis Pseudomonas putida group     CAM CAM     Aztreonam ($$$)   8 ug/ml Susceptible     Cefazolin ($) <=8.00 ug/ml Susceptible       Cefepime ($)   <=2.00 ug/ml Susceptible     Ceftazidime ($$)   <=1 ug/ml Susceptible     Ceftriaxone ($$)   8.00 ug/ml Susceptible     Gentamicin ($$) <=4 ug/ml Susceptible <=2 ug/ml Susceptible     Nitrofurantoin <=32 ug/ml Susceptible       Oxacillin <=0.25 ug/ml Susceptible       Penicillin >2.000 ug/ml Resistant       Piperacillin + Tazobactam ($$$)   <=8 ug/ml Susceptible     Tetracycline <=4 ug/ml Susceptible <=4 ug/ml Susceptible     Tobramycin ($)   <=2 ug/ml Susceptible     Trimethoprim + Sulfamethoxazole ($$$) >2/38 ug/ml Resistant >2/38 ug/ml Resistant     Vancomycin ($) 2.00 ug/ml Susceptible       ZID Performed Yes  Yes                     Specimen Collected: 11/01/24  6:56 AM Last Resulted: 11/03/24 12:29 PM       Order Details        View Encounter        Lab and Collection Details        Routing        Result History     View All Conversations on this Encounter           Result Care Coordination

## 2024-11-05 ENCOUNTER — PROCEDURE VISIT (OUTPATIENT)
Dept: UROLOGY | Facility: CLINIC | Age: 74
End: 2024-11-05
Payer: COMMERCIAL

## 2024-11-05 VITALS
SYSTOLIC BLOOD PRESSURE: 138 MMHG | OXYGEN SATURATION: 97 % | WEIGHT: 170 LBS | HEART RATE: 65 BPM | DIASTOLIC BLOOD PRESSURE: 82 MMHG | BODY MASS INDEX: 22.53 KG/M2 | TEMPERATURE: 98.5 F | HEIGHT: 73 IN

## 2024-11-05 DIAGNOSIS — R33.8 BENIGN PROSTATIC HYPERPLASIA WITH URINARY RETENTION: Primary | ICD-10-CM

## 2024-11-05 DIAGNOSIS — N40.1 BENIGN PROSTATIC HYPERPLASIA WITH URINARY RETENTION: Primary | ICD-10-CM

## 2024-11-05 PROCEDURE — 51702 INSERT TEMP BLADDER CATH: CPT

## 2024-11-06 NOTE — PROGRESS NOTES
"11/5/2024  Estuardo Ray is a 74 y.o. male  [id number]    Diagnosis:  Chief Complaint    Urinary Retention         Patient presents for routine perez cath change managed by Dr. Rodriguez    Plan:  Patient is scheduled to have HoLEP procedure 11/15/24 with Dr. Rodriguez  Patient instructed to call with any questions or concerns in the meantime.    No orders of the defined types were placed in this encounter.       Vitals:    11/05/24 1031   BP: 138/82   Pulse: 65   Temp: 98.5 °F (36.9 °C)   TempSrc: Tympanic   SpO2: 97%   Weight: 77.1 kg (170 lb)   Height: 6' 1\" (1.854 m)           Procedure:    Universal Protocol:  procedure performed by consultantConsent: Verbal consent obtained.  Risks and benefits: risks, benefits and alternatives were discussed  Consent given by: patient  Patient understanding: patient states understanding of the procedure being performed  Patient consent: the patient's understanding of the procedure matches consent given  Procedure consent: procedure consent matches procedure scheduled  Patient identity confirmed: verbally with patient    Bladder catheterization    Date/Time: 11/5/2024 10:30 AM    Performed by: Steph Tinsley LPN  Authorized by: Sabas Burnette MD    Patient location:  Bedside  Consent:     Consent given by:  Patient  Universal protocol:     Procedure explained and questions answered to patient or proxy's satisfaction: yes      Patient identity confirmed:  Verbally with patient  Pre-procedure details:     Procedure purpose:  Therapeutic    Preparation: Patient was prepped and draped in usual sterile fashion    Anesthesia (see MAR for exact dosages):     Anesthesia method:  None  Procedure details:     Bladder irrigation: no      Catheter insertion:  Indwelling    Approach: natural orifice      Catheter type:  Coude, Perez and latex    Catheter size:  18 Fr    Number of attempts:  1    Successful placement: yes      Urine characteristics:  Clear and yellow  Post-procedure " details:     Patient tolerance of procedure:  Tolerated well, no immediate complications  Comments:      Patient presented for routine perez cath exchange. 18 Fr latex perez cath inserted using sterile technique, 10 ml balloon inflated, clear yellow urine return noted. Patient denies any pain/discomfort, no trauma noted to the site. Perez cath tubing secured to right leg via stat lock, patient tolerated procedure well. Patient is educated on maintaining adequate hydration with water to prevent cath clogging and subsequent UTI's. Patient is educated on constipation, treatment, and prevention to allow for efficient urinary flow. Patient is educated on the importance of performing daily cath care to encourage cleanliness. Understanding verbalized.           Steph Tinsley LPN

## 2024-11-13 NOTE — PRE-PROCEDURE INSTRUCTIONS
Pre-Surgery Instructions:   Medication Instructions    dutasteride (AVODART) 0.5 mg capsule Take day of surgery.    losartan-hydrochlorothiazide (HYZAAR) 50-12.5 mg per tablet Hold day of surgery.    pantoprazole (PROTONIX) 40 mg tablet Take day of surgery.    rosuvastatin (CRESTOR) 5 mg tablet Take day of surgery.    tamsulosin (FLOMAX) 0.4 mg Take day of surgery.    Medication instructions for day surgery reviewed. Please use only a sip of water to take your instructed medications. Avoid all over the counter vitamins, supplements and NSAIDS for one week prior to surgery per anesthesia guidelines. Tylenol is ok to take as needed.     You will receive a call one business day prior to surgery with an arrival time and hospital directions. If your surgery is scheduled on a Monday, the hospital will be calling you on the Friday prior to your surgery. If you have not heard from anyone by 8pm, please call the hospital supervisor through the hospital  at [phone]. ([place] [phone] or [place] [phone]).    Do not eat or drink anything after midnight the night before your surgery, including candy, mints, lifesavers, or chewing gum. Do not drink alcohol 24hrs before your surgery. Try not to smoke at least 24hrs before your surgery.       Follow the pre surgery showering instructions as listed in the “My Surgical Experience Booklet” or otherwise provided by your surgeon's office. Do not use a blade to shave the surgical area 1 week before surgery. It is okay to use a clean electric clippers up to 24 hours before surgery. Do not apply any lotions, creams, including makeup, cologne, deodorant, or perfumes after showering on the day of your surgery. Do not use dry shampoo, hair spray, hair gel, or any type of hair products.     No contact lenses, eye make-up, or artificial eyelashes. Remove nail polish, including gel polish, and any artificial, gel, or acrylic nails if possible. Remove all jewelry including  rings and body piercing jewelry.     Wear causal clothing that is easy to take on and off. Consider your type of surgery.    Keep any valuables, jewelry, piercings at home. Please bring any specially ordered equipment (sling, braces) if indicated.    Arrange for a responsible person to drive you to and from the hospital on the day of your surgery. Please confirm the visitor policy for the day of your procedure when you receive your phone call with an arrival time.     Call the surgeon's office with any new illnesses, exposures, or additional questions prior to surgery.    Please reference your “My Surgical Experience Booklet” for additional information to prepare for your upcoming surgery.    See Geriatric Assessment below...  Cognitive Assessment:   CAM:   TUG <15 sec:  Falls (last 6 months):   Hand  score:  -Attempt 1:  -Attempt 2:  -Attempt 3:  Edvin Total Score:   PHQ- 9 Depression Scale:0  Nutrition Assessment Score:13  METS:7.34  Incentive Spirometry Level:   Health goals:  -What are your overall health goals? (quit smoking, wt. loss, rest, decrease stress) not having cancer or any urinary retention any more.    -What brings you strength? (family, friends, Jewish, health) family/friends Zoroastrianism    -What activities are important to you? (exercise, reading, travel, work) gardening, reading, Zoroastrianism helping at food pantry.

## 2024-11-15 ENCOUNTER — HOSPITAL ENCOUNTER (OUTPATIENT)
Facility: HOSPITAL | Age: 74
Setting detail: OUTPATIENT SURGERY
Discharge: HOME | End: 2024-11-16
Attending: UROLOGY | Admitting: UROLOGY
Payer: COMMERCIAL

## 2024-11-15 ENCOUNTER — TELEPHONE (OUTPATIENT)
Dept: UROLOGY | Facility: CLINIC | Age: 74
End: 2024-11-15

## 2024-11-15 ENCOUNTER — ANESTHESIA (OUTPATIENT)
Dept: PERIOP | Facility: HOSPITAL | Age: 74
End: 2024-11-15
Payer: COMMERCIAL

## 2024-11-15 ENCOUNTER — ANESTHESIA EVENT (OUTPATIENT)
Dept: PERIOP | Facility: HOSPITAL | Age: 74
End: 2024-11-15
Payer: COMMERCIAL

## 2024-11-15 VITALS
BODY MASS INDEX: 21.87 KG/M2 | SYSTOLIC BLOOD PRESSURE: 152 MMHG | WEIGHT: 165 LBS | RESPIRATION RATE: 18 BRPM | HEIGHT: 73 IN | HEART RATE: 67 BPM | DIASTOLIC BLOOD PRESSURE: 92 MMHG | TEMPERATURE: 97.2 F | OXYGEN SATURATION: 97 %

## 2024-11-15 DIAGNOSIS — R33.8 BENIGN PROSTATIC HYPERPLASIA WITH URINARY RETENTION: ICD-10-CM

## 2024-11-15 DIAGNOSIS — N40.1 BENIGN PROSTATIC HYPERPLASIA WITH URINARY RETENTION: ICD-10-CM

## 2024-11-15 PROBLEM — M19.90 ARTHRITIS: Status: ACTIVE | Noted: 2024-11-15

## 2024-11-15 PROBLEM — K21.9 GASTROESOPHAGEAL REFLUX DISEASE: Status: ACTIVE | Noted: 2024-11-15

## 2024-11-15 LAB
ABO GROUP BLD: NORMAL
ANION GAP SERPL CALCULATED.3IONS-SCNC: 4 MMOL/L (ref 4–13)
BUN SERPL-MCNC: 17 MG/DL (ref 5–25)
CALCIUM SERPL-MCNC: 8.1 MG/DL (ref 8.4–10.2)
CHLORIDE SERPL-SCNC: 107 MMOL/L (ref 96–108)
CO2 SERPL-SCNC: 28 MMOL/L (ref 21–32)
CREAT SERPL-MCNC: 0.98 MG/DL (ref 0.6–1.3)
ERYTHROCYTE [DISTWIDTH] IN BLOOD BY AUTOMATED COUNT: 13.7 % (ref 11.6–15.1)
GFR SERPL CREATININE-BSD FRML MDRD: 76 ML/MIN/1.73SQ M
GLUCOSE P FAST SERPL-MCNC: 108 MG/DL (ref 65–99)
GLUCOSE SERPL-MCNC: 108 MG/DL (ref 65–140)
HCT VFR BLD AUTO: 43.1 % (ref 36.5–49.3)
HGB BLD-MCNC: 14.3 G/DL (ref 12–17)
MCH RBC QN AUTO: 29.7 PG (ref 26.8–34.3)
MCHC RBC AUTO-ENTMCNC: 33.2 G/DL (ref 31.4–37.4)
MCV RBC AUTO: 90 FL (ref 82–98)
PLATELET # BLD AUTO: 198 THOUSANDS/UL (ref 149–390)
PMV BLD AUTO: 8.5 FL (ref 8.9–12.7)
POTASSIUM SERPL-SCNC: 4 MMOL/L (ref 3.5–5.3)
RBC # BLD AUTO: 4.81 MILLION/UL (ref 3.88–5.62)
RH BLD: POSITIVE
SODIUM SERPL-SCNC: 139 MMOL/L (ref 135–147)
WBC # BLD AUTO: 11.67 THOUSAND/UL (ref 4.31–10.16)

## 2024-11-15 PROCEDURE — 52649 PROSTATE LASER ENUCLEATION: CPT | Performed by: UROLOGY

## 2024-11-15 PROCEDURE — 88344 IMHCHEM/IMCYTCHM EA MLT ANTB: CPT | Performed by: PATHOLOGY

## 2024-11-15 PROCEDURE — NC001 PR NO CHARGE: Performed by: UROLOGY

## 2024-11-15 PROCEDURE — C1769 GUIDE WIRE: HCPCS | Performed by: UROLOGY

## 2024-11-15 PROCEDURE — 80048 BASIC METABOLIC PNL TOTAL CA: CPT | Performed by: UROLOGY

## 2024-11-15 PROCEDURE — 88305 TISSUE EXAM BY PATHOLOGIST: CPT | Performed by: PATHOLOGY

## 2024-11-15 PROCEDURE — 85027 COMPLETE CBC AUTOMATED: CPT | Performed by: UROLOGY

## 2024-11-15 PROCEDURE — C1782 MORCELLATOR: HCPCS | Performed by: UROLOGY

## 2024-11-15 RX ORDER — ACETAMINOPHEN 325 MG/1
650 TABLET ORAL EVERY 6 HOURS PRN
Status: DISCONTINUED | OUTPATIENT
Start: 2024-11-15 | End: 2024-11-16 | Stop reason: HOSPADM

## 2024-11-15 RX ORDER — LIDOCAINE HYDROCHLORIDE 10 MG/ML
INJECTION, SOLUTION EPIDURAL; INFILTRATION; INTRACAUDAL; PERINEURAL AS NEEDED
Status: DISCONTINUED | OUTPATIENT
Start: 2024-11-15 | End: 2024-11-15

## 2024-11-15 RX ORDER — ONDANSETRON 2 MG/ML
4 INJECTION INTRAMUSCULAR; INTRAVENOUS ONCE AS NEEDED
Status: DISCONTINUED | OUTPATIENT
Start: 2024-11-15 | End: 2024-11-15 | Stop reason: HOSPADM

## 2024-11-15 RX ORDER — HYDROMORPHONE HCL/PF 1 MG/ML
0.5 SYRINGE (ML) INJECTION
Status: DISCONTINUED | OUTPATIENT
Start: 2024-11-15 | End: 2024-11-15 | Stop reason: HOSPADM

## 2024-11-15 RX ORDER — PANTOPRAZOLE SODIUM 40 MG/1
40 TABLET, DELAYED RELEASE ORAL
Status: DISCONTINUED | OUTPATIENT
Start: 2024-11-16 | End: 2024-11-16 | Stop reason: HOSPADM

## 2024-11-15 RX ORDER — SODIUM CHLORIDE, SODIUM LACTATE, POTASSIUM CHLORIDE, CALCIUM CHLORIDE 600; 310; 30; 20 MG/100ML; MG/100ML; MG/100ML; MG/100ML
50 INJECTION, SOLUTION INTRAVENOUS CONTINUOUS
Status: DISCONTINUED | OUTPATIENT
Start: 2024-11-15 | End: 2024-11-16 | Stop reason: HOSPADM

## 2024-11-15 RX ORDER — SODIUM CHLORIDE, SODIUM LACTATE, POTASSIUM CHLORIDE, CALCIUM CHLORIDE 600; 310; 30; 20 MG/100ML; MG/100ML; MG/100ML; MG/100ML
INJECTION, SOLUTION INTRAVENOUS CONTINUOUS PRN
Status: DISCONTINUED | OUTPATIENT
Start: 2024-11-15 | End: 2024-11-15

## 2024-11-15 RX ORDER — NITROFURANTOIN 25; 75 MG/1; MG/1
100 CAPSULE ORAL 2 TIMES DAILY WITH MEALS
Status: DISCONTINUED | OUTPATIENT
Start: 2024-11-15 | End: 2024-11-16 | Stop reason: HOSPADM

## 2024-11-15 RX ORDER — MIDAZOLAM HYDROCHLORIDE 2 MG/2ML
INJECTION, SOLUTION INTRAMUSCULAR; INTRAVENOUS AS NEEDED
Status: DISCONTINUED | OUTPATIENT
Start: 2024-11-15 | End: 2024-11-15

## 2024-11-15 RX ORDER — DOCUSATE SODIUM 100 MG/1
100 CAPSULE, LIQUID FILLED ORAL 2 TIMES DAILY
Status: DISCONTINUED | OUTPATIENT
Start: 2024-11-15 | End: 2024-11-16 | Stop reason: HOSPADM

## 2024-11-15 RX ORDER — PROPOFOL 10 MG/ML
INJECTION, EMULSION INTRAVENOUS AS NEEDED
Status: DISCONTINUED | OUTPATIENT
Start: 2024-11-15 | End: 2024-11-15

## 2024-11-15 RX ORDER — ROCURONIUM BROMIDE 10 MG/ML
INJECTION, SOLUTION INTRAVENOUS AS NEEDED
Status: DISCONTINUED | OUTPATIENT
Start: 2024-11-15 | End: 2024-11-15

## 2024-11-15 RX ORDER — ONDANSETRON 2 MG/ML
INJECTION INTRAMUSCULAR; INTRAVENOUS AS NEEDED
Status: DISCONTINUED | OUTPATIENT
Start: 2024-11-15 | End: 2024-11-15

## 2024-11-15 RX ORDER — SODIUM CHLORIDE, SODIUM LACTATE, POTASSIUM CHLORIDE, CALCIUM CHLORIDE 600; 310; 30; 20 MG/100ML; MG/100ML; MG/100ML; MG/100ML
75 INJECTION, SOLUTION INTRAVENOUS CONTINUOUS
Status: DISCONTINUED | OUTPATIENT
Start: 2024-11-15 | End: 2024-11-16 | Stop reason: HOSPADM

## 2024-11-15 RX ORDER — LOSARTAN POTASSIUM 50 MG/1
50 TABLET ORAL DAILY
Status: DISCONTINUED | OUTPATIENT
Start: 2024-11-15 | End: 2024-11-16 | Stop reason: HOSPADM

## 2024-11-15 RX ORDER — MAGNESIUM HYDROXIDE 1200 MG/15ML
LIQUID ORAL AS NEEDED
Status: DISCONTINUED | OUTPATIENT
Start: 2024-11-15 | End: 2024-11-15 | Stop reason: HOSPADM

## 2024-11-15 RX ORDER — HEPARIN SODIUM 5000 [USP'U]/ML
5000 INJECTION, SOLUTION INTRAVENOUS; SUBCUTANEOUS EVERY 8 HOURS SCHEDULED
Status: DISCONTINUED | OUTPATIENT
Start: 2024-11-15 | End: 2024-11-16 | Stop reason: HOSPADM

## 2024-11-15 RX ORDER — METOCLOPRAMIDE HYDROCHLORIDE 5 MG/ML
10 INJECTION INTRAMUSCULAR; INTRAVENOUS ONCE AS NEEDED
Status: DISCONTINUED | OUTPATIENT
Start: 2024-11-15 | End: 2024-11-15 | Stop reason: HOSPADM

## 2024-11-15 RX ORDER — GLYCOPYRROLATE 0.2 MG/ML
INJECTION INTRAMUSCULAR; INTRAVENOUS AS NEEDED
Status: DISCONTINUED | OUTPATIENT
Start: 2024-11-15 | End: 2024-11-15

## 2024-11-15 RX ORDER — HYDROCHLOROTHIAZIDE 12.5 MG/1
12.5 TABLET ORAL DAILY
Status: DISCONTINUED | OUTPATIENT
Start: 2024-11-15 | End: 2024-11-16 | Stop reason: HOSPADM

## 2024-11-15 RX ORDER — PRAVASTATIN SODIUM 40 MG
40 TABLET ORAL
Status: DISCONTINUED | OUTPATIENT
Start: 2024-11-15 | End: 2024-11-16 | Stop reason: HOSPADM

## 2024-11-15 RX ORDER — FENTANYL CITRATE/PF 50 MCG/ML
25 SYRINGE (ML) INJECTION
Status: DISCONTINUED | OUTPATIENT
Start: 2024-11-15 | End: 2024-11-15 | Stop reason: HOSPADM

## 2024-11-15 RX ORDER — CEFAZOLIN SODIUM 2 G/50ML
2000 SOLUTION INTRAVENOUS
Status: COMPLETED | OUTPATIENT
Start: 2024-11-15 | End: 2024-11-15

## 2024-11-15 RX ORDER — FENTANYL CITRATE 50 UG/ML
INJECTION, SOLUTION INTRAMUSCULAR; INTRAVENOUS AS NEEDED
Status: DISCONTINUED | OUTPATIENT
Start: 2024-11-15 | End: 2024-11-15

## 2024-11-15 RX ORDER — DOXYCYCLINE 100 MG/1
100 CAPSULE ORAL EVERY 12 HOURS SCHEDULED
Status: DISCONTINUED | OUTPATIENT
Start: 2024-11-15 | End: 2024-11-16 | Stop reason: HOSPADM

## 2024-11-15 RX ORDER — DEXAMETHASONE SODIUM PHOSPHATE 10 MG/ML
INJECTION, SOLUTION INTRAMUSCULAR; INTRAVENOUS AS NEEDED
Status: DISCONTINUED | OUTPATIENT
Start: 2024-11-15 | End: 2024-11-15

## 2024-11-15 RX ORDER — LIDOCAINE HYDROCHLORIDE 20 MG/ML
JELLY TOPICAL DAILY PRN
Status: DISCONTINUED | OUTPATIENT
Start: 2024-11-15 | End: 2024-11-16 | Stop reason: HOSPADM

## 2024-11-15 RX ADMIN — HEPARIN SODIUM 5000 UNITS: 5000 INJECTION INTRAVENOUS; SUBCUTANEOUS at 16:51

## 2024-11-15 RX ADMIN — PROPOFOL 180 MG: 10 INJECTION, EMULSION INTRAVENOUS at 11:21

## 2024-11-15 RX ADMIN — ACETAMINOPHEN 650 MG: 325 TABLET, FILM COATED ORAL at 16:43

## 2024-11-15 RX ADMIN — LIDOCAINE HYDROCHLORIDE 50 MG: 10 INJECTION, SOLUTION EPIDURAL; INFILTRATION; INTRACAUDAL at 11:21

## 2024-11-15 RX ADMIN — LIDOCAINE HYDROCHLORIDE 5 APPLICATION: 20 JELLY TOPICAL at 16:44

## 2024-11-15 RX ADMIN — LOSARTAN POTASSIUM 50 MG: 50 TABLET, FILM COATED ORAL at 16:48

## 2024-11-15 RX ADMIN — HEPARIN SODIUM 5000 UNITS: 5000 INJECTION INTRAVENOUS; SUBCUTANEOUS at 21:20

## 2024-11-15 RX ADMIN — MIDAZOLAM HYDROCHLORIDE 2 MG: 1 INJECTION, SOLUTION INTRAMUSCULAR; INTRAVENOUS at 11:16

## 2024-11-15 RX ADMIN — SUGAMMADEX 200 MG: 100 INJECTION, SOLUTION INTRAVENOUS at 13:27

## 2024-11-15 RX ADMIN — DOXYCYCLINE 100 MG: 100 CAPSULE ORAL at 21:20

## 2024-11-15 RX ADMIN — PRAVASTATIN SODIUM 40 MG: 40 TABLET ORAL at 16:49

## 2024-11-15 RX ADMIN — HYDROCHLOROTHIAZIDE 12.5 MG: 12.5 TABLET ORAL at 16:48

## 2024-11-15 RX ADMIN — GENTAMICIN SULFATE 400 MG: 40 INJECTION, SOLUTION INTRAMUSCULAR; INTRAVENOUS at 11:50

## 2024-11-15 RX ADMIN — GLYCOPYRROLATE 0.2 MG: 0.2 INJECTION INTRAMUSCULAR; INTRAVENOUS at 12:52

## 2024-11-15 RX ADMIN — DEXAMETHASONE SODIUM PHOSPHATE 10 MG: 10 INJECTION INTRAMUSCULAR; INTRAVENOUS at 11:21

## 2024-11-15 RX ADMIN — SODIUM CHLORIDE, SODIUM LACTATE, POTASSIUM CHLORIDE, AND CALCIUM CHLORIDE: .6; .31; .03; .02 INJECTION, SOLUTION INTRAVENOUS at 13:13

## 2024-11-15 RX ADMIN — SODIUM CHLORIDE, SODIUM LACTATE, POTASSIUM CHLORIDE, AND CALCIUM CHLORIDE: .6; .31; .03; .02 INJECTION, SOLUTION INTRAVENOUS at 09:18

## 2024-11-15 RX ADMIN — DOXYCYCLINE 100 MG: 100 CAPSULE ORAL at 16:49

## 2024-11-15 RX ADMIN — ROCURONIUM 20 MG: 50 INJECTION, SOLUTION INTRAVENOUS at 12:49

## 2024-11-15 RX ADMIN — PHENYLEPHRINE HYDROCHLORIDE 30 MCG/MIN: 50 INJECTION INTRAVENOUS at 11:32

## 2024-11-15 RX ADMIN — DOCUSATE SODIUM 100 MG: 100 CAPSULE, LIQUID FILLED ORAL at 17:15

## 2024-11-15 RX ADMIN — NITROFURANTOIN (MONOHYDRATE/MACROCRYSTALS) 100 MG: 75; 25 CAPSULE ORAL at 16:49

## 2024-11-15 RX ADMIN — FENTANYL CITRATE 50 MCG: 50 INJECTION INTRAMUSCULAR; INTRAVENOUS at 11:21

## 2024-11-15 RX ADMIN — ROCURONIUM 50 MG: 50 INJECTION, SOLUTION INTRAVENOUS at 11:21

## 2024-11-15 RX ADMIN — CEFAZOLIN SODIUM 2000 MG: 2 SOLUTION INTRAVENOUS at 11:16

## 2024-11-15 RX ADMIN — ONDANSETRON 4 MG: 2 INJECTION INTRAMUSCULAR; INTRAVENOUS at 13:15

## 2024-11-15 RX ADMIN — FENTANYL CITRATE 50 MCG: 50 INJECTION INTRAMUSCULAR; INTRAVENOUS at 12:56

## 2024-11-15 NOTE — OP NOTE
OPERATIVE REPORT  PATIENT NAME: Estuardo Ray    :  1950  MRN: [id number]  Pt Location: [facility] OR ROOM 01    SURGERY DATE: 11/15/2024    Surgeons and Role:     * Mian Rodriguez DO - Primary    Preop Diagnosis:  Benign prostatic hyperplasia with urinary retention [N40.1, R33.8]    Post-Op Diagnosis Codes:     * Benign prostatic hyperplasia with urinary retention [N40.1, R33.8]    Procedure(s):  LASER ENUCLEATION PROSTATE W MORCELLATION    Specimen(s):  ID Type Source Tests Collected by Time Destination   1 : PROSTATE CHIPS Tissue Prostate TISSUE EXAM Mian Iker EddyDO saima 11/15/2024 1255        Estimated Blood Loss:   Minimal    Drains:  Urethral Catheter Coude 18 Fr. (Active)   Number of days: 74       Urethral Catheter Latex 18 Fr. (Active)   Number of days: 39       Urethral Catheter Coude;Three way 22 Fr. (Active)   Number of days: 0       Continuous Bladder Irrigation Three-way (Active)   Number of days: 0       Anesthesia Type:   General    Operative Indications:  Benign prostatic hyperplasia with urinary retention [N40.1, R33.8]        74 y.o. old male with BPH, urinary retention     No AC     Hx of ePSA s/p neg Pbx x2     Prostate MRI 3/5/2021: PI-RADS 3 lesion; 113 g prostate     Subsequent fusion guided biopsy in  was negative     Started on Flomax, Avodart     PSA 9.12 on 2024     Patient was in retention for over 500 cc when he was seen in the office in 2024.  He had near syncopal event at that time and needed to go to ED.  Due to his issues with the Raya catheter, it was decided that he would need the OR for his cystoscopy, TRUS.     OR 10/7/24:  No medial stenosis  No urethral strictures  Enlarged prostate with bilateral lobar hypertrophy.  Kissing lobes.  Large median lobe with significant intravesical component.  Moderate to severe trabeculations  Findings of catheter cystitis  No bladder lesions  No bladder stones  Multiple cellules     Transrectal ultrasound  "demonstrated prostate measurements of 6.0 x 4.3 x 5.9 cm, 79.33 g         Consented for HoLEP in office          Operative Findings:        Mild meatal stenosis that was ultimately able to accommodate the scope with slow steady pressure  No urethral strictures    Bilateral ureteral orifices in orthotopic positions  Lesions: None  Trabeculations: Moderate to severe  Diverticuli: None  Stones: None      Standard 3 lobe technique HoLEP performed    Nice open channel at the end of the procedure    Bilateral ureteral orifices identified and the procedure and were confirmed to be unharmed    22 Yoruba three-way hematuria catheter placed at end of case.  30 in balloon.  CBI started.            Complications:   None    Procedure and Technique:            Patient identified in the preop holding area.  Consent was obtained.  Risks and benefits of the procedure were explained to the patient.  Patient was in agreement.  Patient was brought back to the OR and placed supine on the table.  Bilateral lower extremity SCDs were placed and turned on.  Patient underwent smooth induction of general anesthesia.  IV Ancef, gentamicin was administered for antibiotics.  Bilateral lower extremities were placed in stirrups.  Patient was in dorsolithotomy position.  Area was prepped and draped in sterile fashion.  Timeout was performed by the OR team.     A 24 Yoruba cystoscope with 30 degree lens was introduced into the urethra and bladder.  Pan cystourethroscopy was performed.  See the above findings for details.     The visual  was exchanged for a laser bridge element.       Using a 550 micron fiber and laser settings of 2.0J and 40Hz with virtual basketing for enculeation and 1.5J and 30Hz for coagulation ablation was initiated at the right sulcus at the 5:00 and 7:00 position working toward the veramontanum.   A \"U\" shaped incision was then made around the verumontanum beginning to undermine the median and posterior medial lateral " lobes.     The median lobe was then dissected out with laser energy and eventually passed into the bladder.       Thereafter attention was turned to the right lobe of prostate which was enucleated by first working posterior along the floor of the prostate towards the lateral wall.  Then a new incision was made at the 12:00 o'clock location and the plane was extended towards the patient's left anterior side.  Any localized bleeders were cauterized.  The 2 dissection planes were made to meet each other on the lateral aspect of the wall.  Care was taken at the apex of the prostate to minimize energy during enucleation.   Eventually with additional circumferential enucleation the right lateral lobe was flipped into the bladder and enucleation was completed by cutting small residual tissue at the bladder neck with care taken to avoid injury to the nearby ureteral orifice.     Attention was was then turned to the contralateral (left) side.  In a similar fashion the enucleation was started with creation of a plane at the posterior aspect of the prostate working towards the apex and laterally.  We then moved to the anterior aspect and worked above and around the prostate anteriorly.   As the enucleation progressed we were able to flip the lobe into the bladder and finish enucleation along the bladder neck freeing the lobe.       Attention was now turned towards the prostatic fossa.  There was no significant adenoma left behind but there were a few areas of small adenoma which were treated with laser to smooth out the fossa.  Attention was also turned to localized bleeders which were treated with treated with laser.     Attention was now turned towards morcellation.  An offset nephroscope was introduced into the outer sheath with 2 inflows hooked up.  The morcellation probe was introduced through the nephroscope and the adenoma was engaged and morcellated.  After morcellation was complete the bladder and prostatic fossa were  re-evaluated to ensure there was no residual floating adenoma pieces.     Bladder was reinspected and ureteral orifices were unharmed.  No mucosal bladder injuries were noted. Scope was removed from bladder and urethra. 22 Armenian three-way hematuria Raya catheter was placed with return of clear urine.  30 cc were placed in the balloon.  CBI was initiated.      Patient was uneventfully awoken from anesthesia and extubated.  He was brought to PACU in good condition.                 A qualified resident physician was not available.    Patient Disposition:  PACU              SIGNATURE: Mian Rodriguez,   DATE: November 15, 2024  TIME: 1:29 PM        PLAN  - Admit overnight for observation and CBI  - PACU labs.  If normal, does not need labs tomorrow.  - CBI overnight.  Clamp trial on the morning of 11/16/2024.  If passes clamp trial, can go home with Raya catheter.  Office trial of void in 3 to 5 days needs to be scheduled.  - Macrobid and doxycycline x 3 days  - Stool softener

## 2024-11-15 NOTE — ANESTHESIA POSTPROCEDURE EVALUATION
Post-Op Assessment Note    CV Status:  Stable    Pain management: adequate       Mental Status:  Alert and awake   Hydration Status:  Euvolemic   PONV Controlled:  Controlled   Airway Patency:  Patent     Post Op Vitals Reviewed: Yes    No anethesia notable event occurred.    Staff: Anesthesiologist           Last Filed PACU Vitals:  Vitals Value Taken Time   Temp 97 °F (36.1 °C) 11/15/24 1415   Pulse 72 11/15/24 1418   /76 11/15/24 1415   Resp 26 11/15/24 1418   SpO2 95 % 11/15/24 1418   Vitals shown include unfiled device data.    Modified Myra:  Activity: 2 (11/15/2024  1:47 PM)  Respiration: 2 (11/15/2024  1:47 PM)  Circulation: 2 (11/15/2024  1:47 PM)  Consciousness: 1 (11/15/2024  1:47 PM)  Oxygen Saturation: 1 (11/15/2024  1:47 PM)  Modified Myra Score: 8 (11/15/2024  1:47 PM)

## 2024-11-15 NOTE — ANESTHESIA PREPROCEDURE EVALUATION
Procedure:  LASER ENUCLEATION PROSTATE W MORCELLATION (Abdomen)    Relevant Problems   CARDIO   (+) Mixed hyperlipidemia   (+) Primary hypertension      GI/HEPATIC   (+) Gastroesophageal reflux disease      /RENAL   (+) Benign prostatic hyperplasia with urinary retention   (+) Hypertensive kidney disease with stage 3a chronic kidney disease (HCC) (Resolved)      MUSCULOSKELETAL   (+) Arthritis      Urinary   (+) Elevated PSA      Surgery/Wound/Pain   (+) Heartburn      Other   (+) Prediabetes        Physical Exam    Airway    Mallampati score: I  TM Distance: >3 FB  Neck ROM: full     Dental   No notable dental hx     Cardiovascular      Pulmonary      Other Findings        Anesthesia Plan  ASA Score- 2     Anesthesia Type- general with ASA Monitors.         Additional Monitors:     Airway Plan: ETT.           Plan Factors-Exercise tolerance (METS): >4 METS.    Chart reviewed.    Patient summary reviewed.    Patient is not a current smoker.  Patient did not smoke on day of surgery.    Obstructive sleep apnea risk education given perioperatively.        Induction- intravenous.    Postoperative Plan- Plan for postoperative opioid use. Planned trial extubation    Perioperative Resuscitation Plan - Level 1 - Full Code.       Informed Consent- Anesthetic plan and risks discussed with patient.  I personally reviewed this patient with the CRNA. Discussed and agreed on the Anesthesia Plan with the CRNA..

## 2024-11-15 NOTE — PLAN OF CARE
Problem: PAIN - ADULT  Goal: Verbalizes/displays adequate comfort level or baseline comfort level  Description: Interventions:  - Encourage patient to monitor pain and request assistance  - Assess pain using appropriate pain scale  - Administer analgesics based on type and severity of pain and evaluate response  - Implement non-pharmacological measures as appropriate and evaluate response  - Consider cultural and social influences on pain and pain management  - Notify physician/advanced practitioner if interventions unsuccessful or patient reports new pain  Outcome: Progressing     Problem: SAFETY ADULT  Goal: Patient will remain free of falls  Description: INTERVENTIONS:  - Educate patient/family on patient safety including physical limitations  - Instruct patient to call for assistance with activity   - Consult OT/PT to assist with strengthening/mobility   - Keep Call bell within reach  - Keep bed low and locked with side rails adjusted as appropriate  - Keep care items and personal belongings within reach  - Initiate and maintain comfort rounds  - Make Fall Risk Sign visible to staff  - Offer Toileting every 2 Hours, in advance of need  - Initiate/Maintain bed/chair alarm  - Obtain necessary fall risk management equipment  - Apply yellow socks and bracelet for high fall risk patients  - Consider moving patient to room near nurses station  Outcome: Progressing  Goal: Maintain or return to baseline ADL function  Description: INTERVENTIONS:  -  Assess patient's ability to carry out ADLs; assess patient's baseline for ADL function and identify physical deficits which impact ability to perform ADLs (bathing, care of mouth/teeth, toileting, grooming, dressing, etc.)  - Assess/evaluate cause of self-care deficits   - Assess range of motion  - Assess patient's mobility; develop plan if impaired  - Assess patient's need for assistive devices and provide as appropriate  - Encourage maximum independence but intervene and  supervise when necessary  - Involve family in performance of ADLs  - Assess for home care needs following discharge   - Consider OT consult to assist with ADL evaluation and planning for discharge  - Provide patient education as appropriate  Outcome: Progressing  Goal: Maintains/Returns to pre admission functional level  Description: INTERVENTIONS:  - Perform AM-PAC 6 Click Basic Mobility/ Daily Activity assessment daily.  - Set and communicate daily mobility goal to care team and patient/family/caregiver.   - Collaborate with rehabilitation services on mobility goals if consulted  - Perform Range of Motion 3 times a day.  - Reposition patient every 2 hours.  - Dangle patient 3 times a day  - Stand patient 3 times a day  - Ambulate patient 3 times a day  - Out of bed to chair 3 times a day   - Out of bed for meals 3 times a day  - Out of bed for toileting  - Record patient progress and toleration of activity level   Outcome: Progressing     Problem: DISCHARGE PLANNING  Goal: Discharge to home or other facility with appropriate resources  Description: INTERVENTIONS:  - Identify barriers to discharge w/patient and caregiver  - Arrange for needed discharge resources and transportation as appropriate  - Identify discharge learning needs (meds, wound care, etc.)  - Arrange for interpretive services to assist at discharge as needed  - Refer to Case Management Department for coordinating discharge planning if the patient needs post-hospital services based on physician/advanced practitioner order or complex needs related to functional status, cognitive ability, or social support system  Outcome: Progressing     Problem: Knowledge Deficit  Goal: Patient/family/caregiver demonstrates understanding of disease process, treatment plan, medications, and discharge instructions  Description: Complete learning assessment and assess knowledge base.  Interventions:  - Provide teaching at level of understanding  - Provide teaching via  preferred learning methods  Outcome: Progressing     Problem: GENITOURINARY - ADULT  Goal: Maintains or returns to baseline urinary function  Description: INTERVENTIONS:  - Assess urinary function  - Encourage oral fluids to ensure adequate hydration if ordered  - Administer IV fluids as ordered to ensure adequate hydration  - Administer ordered medications as needed  - Offer frequent toileting  - Follow urinary retention protocol if ordered  Outcome: Progressing  Goal: Absence of urinary retention  Description: INTERVENTIONS:  - Assess patient’s ability to void and empty bladder  - Monitor I/O  - Bladder scan as needed  - Discuss with physician/AP medications to alleviate retention as needed  - Discuss catheterization for long term situations as appropriate  Outcome: Progressing  Goal: Urinary catheter remains patent  Description: INTERVENTIONS:  - Assess patency of urinary catheter  - If patient has a chronic perez, consider changing catheter if non-functioning  - Follow guidelines for intermittent irrigation of non-functioning urinary catheter  Outcome: Progressing

## 2024-11-15 NOTE — TELEPHONE ENCOUNTER
Patient has been scheduled for TOV #1 post HoLEP on 11/20/24 at 9:30 am and 1:30 pm. Patient has also been scheduled for 1 month follow up with AP 1/3/25. Patient has subsequent follow up with Dr. Rodriguez 2/19/25 at 7:30 am. Patient will be made aware of all appointments at time of post op call.

## 2024-11-15 NOTE — DISCHARGE INSTR - AVS FIRST PAGE
Postoperative Instructions for Laser Enucleation of the Prostate    Diet    You may eat whatever you like after your surgery. Sometimes anesthesia can cause nausea, so it may be a good idea to avoid heavy foods for a few days after your procedure.    Raya catheter      You are going home with a tube in the bladder called a Raya catheter. This drains urine from your bladder out of the tube exiting from your penis.   Please make sure that the catheter is always secured to the leg. There should never be any tension or tugging on the catheter. Take care not to pull your catheter when rolling in bed or changing position.   You can have your catheter draining to a large bag or a smaller leg bag. Patients typically like to use the larger bag overnight so that they do not need to wake up in middle of night to empty the smaller leg bag.  The Raya catheter has a balloon on the end of it to keep it from falling out of the bladder. This balloon may give you the feeling that you need to urinate. These sensations are called bladder spasms.  Bladder spasms may also cause you to urinate around the Raya catheter. This is not abnormal and not an emergency as long as you see that additional urine is draining properly in the catheter tubing.  Please monitor the catheter periodically to make sure that it is draining. If the catheter stops draining, get up and walk around. If it is still not draining, call the office or come to the ER as the catheter may be clogged and need to be irrigated.  You may see blood in catheter tubing. This is normal. Please make sure that you drink plenty of fluids. Be sure to call office or present to ER if you notice large blood clots in your urine or if catheter is not draining as explained above.  Once the Raya catheter is removed, it is normal to have burning and stinging with urination for a few weeks after surgery. It is also common to have more frequent urination and a greater sense of urge to  urinate. There may not be much warning from the time you feel the urge to urinate to the time when bladder is ready to empty.  After your catheter is removed, you will likely have some urinary leakage. This is very normal. It can take up to a few months for this to improve. This will be discussed more on your follow up visits if the issue persists.      Activity    It is very important to walk after your procedure. Walking prevents blood clots in legs and lungs. You may go up and down stairs.  Avoid any strenuous activity or lifting more than ten pounds until your follow up appointment about 4 weeks after surgery. This includes any heavy lifting, running, riding a bicycle, or golf. This also includes activities such as raking leaves, mowing the lawn, shoveling snow, or other strenuous chores.  If you see blood in your urine, increase the amount of water you are drinking and avoid strenuous activity until the blood clears.  You may shower the day after your surgery. If you have a catheter in place, please be careful not to have the catheter tugged while you are bathing. With the catheter in place, you may notice some clot or residue on the part of the tube coming out of the penis. You can gently clean this with soap and water as needed.  It is typically best to avoid baths, hot tubs, pools, or any body of standing water while you have Raya catheter in place. If there is no Raya catheter in place, you can resume showering/bathing/swimming as usual.    Medications    Take the medications prescribed at the time of your discharge from the hospital.  Colace: This is a stool softener. Please take this as instructed to ensure softer bowel movements so that you are not straining. Straining during bowel movements can lead to increased prostate bleeding. In addition to taking your stool softener, be sure to eat plenty of fiber and to drink plenty of water.  Macrobid and doxycycline: These are antibiotics. Please take this as  instructed for 3 days.  For any aches or headaches you may use over the counter Tylenol or Extra Strength Tylenol    Follow up plan    The office will call you to schedule you for your catheter removal appointment.    Return precautions    Please call the office or present to ER if you experience concerning symptoms including but not limited to: fever greater than 100.4 degrees Fahrenheit, chills, nausea, vomiting, passage of large blood clots in urine, inability to urinate (or lack of urine draining from Raya catheter if you have one in place), abdominal/flank pain.

## 2024-11-15 NOTE — ANESTHESIA POSTPROCEDURE EVALUATION
Post-Op Assessment Note    CV Status:  Stable  Pain Score: 0    Pain management: adequate       Mental Status:  Alert and sleepy   Hydration Status:  Stable   PONV Controlled:  None   Airway Patency:  Patent  Airway: intubated  Two or more mitigation strategies used for obstructive sleep apnea   Post Op Vitals Reviewed: Yes    No anethesia notable event occurred.    Staff: CRNA           Last Filed PACU Vitals:  Vitals Value Taken Time   Temp 97    Pulse 76 11/15/24 1347   /77    Resp 16    SpO2 100 % 11/15/24 1347   Vitals shown include unfiled device data.    Modified Myra:  No data recorded

## 2024-11-15 NOTE — H&P
UROLOGY H&P NOTE     Patient Identifiers: Estuardo Ray (MRN [id number])    Date of Service: 11/15/2024    History of Present Illness:     Estuardo Ray is a 74 y.o. old with a history of BPH, urinary retention    Past Medical, Past Surgical History:     Past Medical History:   Diagnosis Date    Arthritis A couple years now    Cut back on sugar intake and it's better now. Mostly effecting my knees    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    Colon polyp     GERD (gastroesophageal reflux disease) Armin 15 2023    Had it occasionally,  but it recently became worse and two antacids wouldn't relieve it. Started to take 1 Prilosec every other day and this relived it.    Hemorrhoids     Hypertension     Inflamed seborrheic keratosis     Multiple benign polyps of large intestine     History of personal colonic polyps    Neoplasm of uncertain behavior of skin     Sebaceous cyst    :    Past Surgical History:   Procedure Laterality Date    COLONOSCOPY      HAND SURGERY Left     thumb ligament repair    INGUINAL HERNIA REPAIR      KNEE ARTHROSCOPY Left     VA CYSTOURETHROSCOPY N/A 10/7/2024    Procedure: CYSTOSCOPY, TRUS;  Surgeon: Mian Rodriguez DO;  Location: [facility];  Service: Urology    TONSILLECTOMY      US GUIDED INTRAOPERATIVE  10/7/2024   :    Medications, Allergies:   No current facility-administered medications for this encounter.    Allergies:  No Known Allergies:    Social and Family History:   Social History:   Social History     Tobacco Use    Smoking status: Former     Current packs/day: 0.00     Average packs/day: 0.5 packs/day for 5.0 years (2.5 ttl pk-yrs)     Types: Cigarettes     Start date:      Quit date:      Years since quittin.9    Smokeless tobacco: Never   Vaping Use    Vaping status: Never Used   Substance Use Topics    Alcohol use: Not Currently    Drug use: No   .    Social History     Tobacco Use   Smoking Status Former    Current packs/day: 0.00     Average packs/day: 0.5 packs/day for 5.0 years (2.5 ttl pk-yrs)    Types: Cigarettes    Start date:     Quit date:     Years since quittin.9   Smokeless Tobacco Never       Family History:  Family History   Problem Relation Age of Onset    Stroke Mother         stroke syndrome    Other Father         hematologic disorder   :     Review of Systems:     General: Fever, chills, or night sweats: negative  Cardiac: Negative for chest pain.    Pulmonary: Negative for shortness of breath.  Gastrointestinal: Abdominal pain negative.  Nausea, vomiting, or diarrhea negative,  Genitourinary: See HPI above.  Patient does not have hematuria.  All other systems queried were negative.    Physical Exam:   General: Patient is pleasant and in NAD. Awake and alert  There were no vitals taken for this visit.No data recorded.  current;    No intake/output data recorded.  Cardiac: Peripheral edema: negative  Pulmonary: Non-labored breathing  Abdomen: Soft, non-tender, non-distended.  No surgical scars.  No masses, tenderness, hernias noted.    Genitourinary: Negative CVA tenderness, negative suprapubic tenderness.      NARAYANAN: in place    Labs:     Lab Results   Component Value Date    HGB 14.9 2024    HCT 45.3 2024    WBC 7.32 2024     2024   ]    Lab Results   Component Value Date     2015    K 3.8 2024     2024    CO2 34 (H) 2024    BUN 19 2024    CREATININE 1.14 2024    CALCIUM 9.4 2024    GLUCOSE 110 2015   ]    Imaging:   I personally reviewed the images and report of the following studies, and reviewed them with the patient:    Reviewed, see below      ASSESSMENT:     74 y.o. old male with BPH, urinary retention    No AC     Hx of ePSA s/p neg Pbx x2     Prostate MRI 3/5/2021: PI-RADS 3 lesion; 113 g prostate     Subsequent fusion guided biopsy in  was negative     Started on Flomax, Avodart     PSA 9.12 on 2024      Patient was in retention for over 500 cc when he was seen in the office in September 2024.  He had near syncopal event at that time and needed to go to ED.  Due to his issues with the Raya catheter, it was decided that he would need the OR for his cystoscopy, TRUS.     OR 10/7/24:  No medial stenosis  No urethral strictures  Enlarged prostate with bilateral lobar hypertrophy.  Kissing lobes.  Large median lobe with significant intravesical component.  Moderate to severe trabeculations  Findings of catheter cystitis  No bladder lesions  No bladder stones  Multiple cellules     Transrectal ultrasound demonstrated prostate measurements of 6.0 x 4.3 x 5.9 cm, 79.33 g       Consented for HoLEP in office    PLAN:       Preop ancef, gent  OR plan as above  Obs overnight on CBI

## 2024-11-15 NOTE — TELEPHONE ENCOUNTER
Patient status post HoLEP at [place] on 11/15/2024    Plan is for him to go home on 11/16/2024 with his Raya catheter    He needs nurse visit trial of void in around 3 to 5 days    He needs [facility] visit in about 1 month for symptom check    He needs an appointment with me in 3 to 4 months

## 2024-11-16 PROCEDURE — 99024 POSTOP FOLLOW-UP VISIT: CPT | Performed by: NURSE PRACTITIONER

## 2024-11-16 RX ORDER — NITROFURANTOIN 25; 75 MG/1; MG/1
100 CAPSULE ORAL 2 TIMES DAILY
Qty: 6 CAPSULE | Refills: 0 | Status: SHIPPED | OUTPATIENT
Start: 2024-11-16 | End: 2024-11-19

## 2024-11-16 RX ORDER — DOXYCYCLINE 50 MG/1
100 TABLET ORAL 2 TIMES DAILY
Qty: 12 TABLET | Refills: 0 | Status: SHIPPED | OUTPATIENT
Start: 2024-11-16 | End: 2024-11-19

## 2024-11-16 RX ORDER — DOCUSATE SODIUM 100 MG/1
100 CAPSULE, LIQUID FILLED ORAL 2 TIMES DAILY
Qty: 10 CAPSULE | Refills: 0 | Status: SHIPPED | OUTPATIENT
Start: 2024-11-16 | End: 2024-11-22

## 2024-11-16 RX ADMIN — HEPARIN SODIUM 5000 UNITS: 5000 INJECTION INTRAVENOUS; SUBCUTANEOUS at 05:51

## 2024-11-16 RX ADMIN — PRAVASTATIN SODIUM 40 MG: 40 TABLET ORAL at 15:54

## 2024-11-16 RX ADMIN — DOXYCYCLINE 100 MG: 100 CAPSULE ORAL at 09:12

## 2024-11-16 RX ADMIN — NITROFURANTOIN (MONOHYDRATE/MACROCRYSTALS) 100 MG: 75; 25 CAPSULE ORAL at 09:12

## 2024-11-16 RX ADMIN — HYDROCHLOROTHIAZIDE 12.5 MG: 12.5 TABLET ORAL at 09:12

## 2024-11-16 RX ADMIN — DOCUSATE SODIUM 100 MG: 100 CAPSULE, LIQUID FILLED ORAL at 09:12

## 2024-11-16 RX ADMIN — LOSARTAN POTASSIUM 50 MG: 50 TABLET, FILM COATED ORAL at 09:12

## 2024-11-16 RX ADMIN — PANTOPRAZOLE SODIUM 40 MG: 40 TABLET, DELAYED RELEASE ORAL at 05:51

## 2024-11-16 RX ADMIN — HEPARIN SODIUM 5000 UNITS: 5000 INJECTION INTRAVENOUS; SUBCUTANEOUS at 15:54

## 2024-11-16 RX ADMIN — NITROFURANTOIN (MONOHYDRATE/MACROCRYSTALS) 100 MG: 75; 25 CAPSULE ORAL at 15:54

## 2024-11-16 NOTE — PLAN OF CARE
Problem: GENITOURINARY - ADULT  Goal: Maintains or returns to baseline urinary function  Description: INTERVENTIONS:  - Assess urinary function  - Encourage oral fluids to ensure adequate hydration if ordered  - Administer IV fluids as ordered to ensure adequate hydration  - Administer ordered medications as needed  - Offer frequent toileting  - Follow urinary retention protocol if ordered  Outcome: Progressing  Goal: Absence of urinary retention  Description: INTERVENTIONS:  - Assess patient’s ability to void and empty bladder  - Monitor I/O  - Bladder scan as needed  - Discuss with physician/AP medications to alleviate retention as needed  - Discuss catheterization for long term situations as appropriate  Outcome: Progressing  Goal: Urinary catheter remains patent  Description: INTERVENTIONS:  - Assess patency of urinary catheter  - If patient has a chronic perez, consider changing catheter if non-functioning  - Follow guidelines for intermittent irrigation of non-functioning urinary catheter  Outcome: Progressing     Problem: SAFETY ADULT  Goal: Maintain or return to baseline ADL function  Description: INTERVENTIONS:  -  Assess patient's ability to carry out ADLs; assess patient's baseline for ADL function and identify physical deficits which impact ability to perform ADLs (bathing, care of mouth/teeth, toileting, grooming, dressing, etc.)  - Assess/evaluate cause of self-care deficits   - Assess range of motion  - Assess patient's mobility; develop plan if impaired  - Assess patient's need for assistive devices and provide as appropriate  - Encourage maximum independence but intervene and supervise when necessary  - Involve family in performance of ADLs  - Assess for home care needs following discharge   - Consider OT consult to assist with ADL evaluation and planning for discharge  - Provide patient education as appropriate  Outcome: Progressing

## 2024-11-16 NOTE — ASSESSMENT & PLAN NOTE
Postoperative day 1 HoLEP    Plan:  Clamp CBI  Observed for several hours  If urine clears by noon, can be discharged with Raya catheter in situ and trial of void outpatient

## 2024-11-16 NOTE — DISCHARGE SUMMARY
Discharge Summary - Urology   Name: Estuardo Ray 74 y.o. male I MRN: [id number]  Unit/Bed#: S -01 I Date of Admission: 11/15/2024   Date of Service: 11/16/2024 I Hospital Day: 0  { ?Quick Links I Problem List I PORCH I Billing Tip:41266}  Admission Date: 11/15/2024  Discharge Date:   Admitting Diagnosis: Benign prostatic hyperplasia with urinary retention [N40.1, R33.8]    HPI: Estuardo Ray  Is a 74-year-old male with BPH failing medical management.  After next nation of his versus benefits and potential complications patient was agreeable to proceed with HoLEP.    Procedures Performed: No orders of the defined types were placed in this encounter.  Laser nucleation of prostate with morcellation    Hospital Course: {Hospital Course:23106} ***    Significant Findings, Care, Treatment and Services Provided: ***    Lab Results: {SL IP Surg Onc Labs:85029}    Complications: ***    Discharge Diagnosis: ***    Medical Problems       Resolved Problems  Date Reviewed: 10/22/2024   None         Condition at Discharge: {condition:94822}         Discharge instructions/Information to patient and family:   See after visit summary for information provided to patient and family.      Provisions for Follow-Up Care:  See after visit summary for information related to follow-up care and any pertinent home health orders.      Disposition: {Discharge Disposition:22158}      Planned Readmission: {EXAM; YES/NO:89379}    Discharge Statement:  I have spent a total time of *** minutes in caring for this patient on the day of the visit/encounter. {>30 minutes of time was spent on:58166}.    Discharge Medications:  See after visit summary for reconciled discharge medications provided to patient and family.     orders.      Disposition: Home      Planned Readmission: No    Discharge Statement:  I have spent a total time of 20 minutes in caring for this patient on the day of the visit/encounter.     Discharge Medications:  See after visit summary for reconciled discharge medications provided to patient and family.

## 2024-11-19 NOTE — TELEPHONE ENCOUNTER
Spoke with patient to make him aware of TOV scheduled for 11/20/24 at 9:30 am. Patient confirmed appointment stating he will be here.     Patient states he is very happy with recent HoLEP and denies any hematuria, pain, or discomfort at this time. This nurse will review post cath removal instructions with patient during tomorrow's visit. No further assistance needed at this time.

## 2024-11-20 ENCOUNTER — PROCEDURE VISIT (OUTPATIENT)
Dept: UROLOGY | Facility: CLINIC | Age: 74
End: 2024-11-20
Payer: COMMERCIAL

## 2024-11-20 VITALS
DIASTOLIC BLOOD PRESSURE: 68 MMHG | SYSTOLIC BLOOD PRESSURE: 128 MMHG | HEIGHT: 73 IN | WEIGHT: 165 LBS | OXYGEN SATURATION: 96 % | BODY MASS INDEX: 21.87 KG/M2 | HEART RATE: 88 BPM | TEMPERATURE: 97.6 F

## 2024-11-20 DIAGNOSIS — R33.8 BENIGN PROSTATIC HYPERPLASIA WITH URINARY RETENTION: Primary | ICD-10-CM

## 2024-11-20 DIAGNOSIS — R12 HEARTBURN: ICD-10-CM

## 2024-11-20 DIAGNOSIS — I10 PRIMARY HYPERTENSION: Chronic | ICD-10-CM

## 2024-11-20 DIAGNOSIS — N40.1 BENIGN PROSTATIC HYPERPLASIA WITH URINARY RETENTION: Primary | ICD-10-CM

## 2024-11-20 LAB — POST-VOID RESIDUAL VOLUME, ML POC: 252 ML

## 2024-11-20 PROCEDURE — 51798 US URINE CAPACITY MEASURE: CPT

## 2024-11-20 PROCEDURE — 99024 POSTOP FOLLOW-UP VISIT: CPT

## 2024-11-20 NOTE — PROGRESS NOTES
"11/20/2024    Estuardo SANCHEZ [facility]  1950  [id number]    Diagnosis  Chief Complaint    Post-op         Patient presents for TOV #1 post HoLEP managed by Dr. Rodriguez    Plan  Patient is to have perez cath removed this morning in office and then return this afternoon for Post Void Residual    Procedure Perez removal/voiding trial    Perez catheter removed after deflation of an intact balloon. Patient tolerated well. Encouraged patient to hydrate well and return this afternoon for post void residual. He knows he may return early if uncomfortable and unable to urinate. Patient agrees to this plan.    Patient returned this afternoon. Patient states able to void. Initial PVR was 487 ml Patient voided 235 ml while in office. Bladder ultrasound performed and PVR measured 252 ml.    Recent Results (from the past 4 hours)   POCT Measure PVR    Collection Time: 11/20/24  1:58 PM   Result Value Ref Range    POST-VOID RESIDUAL VOLUME, ML  mL           Vitals:    11/20/24 0931   BP: 128/68   Patient Position: Sitting   Cuff Size: Standard   Pulse: 88   Temp: 97.6 °F (36.4 °C)   TempSrc: Temporal   SpO2: 96%   Weight: 74.8 kg (165 lb)   Height: 6' 1\" (1.854 m)     Patient presented this morning to have perez cath removed. 10 mL balloon deflated, upon removal perez cath tip noted to be intact, no trauma noted. Patient denies any pain/discomfort. Patient is given educational literature on how to perform Pelvic Floor Exercises for efficient bladder strengthening. This nurse reviewed information with patient at length answering all questions. Patient educated on maintaining adequate hydration throughout the day, refraining from ingesting bladder irritants, avoid holding urine for extended periods of time, the process of double voiding, and urinating every 1-2 hours to encourage emptying the bladder. Patient verbalizes understanding at this time.        Patient returned this afternoon stated he maintained adequate hydration and " urinated throughout the day. Reports no complications with urination. Upon conferring with Dedra ALVARADO it has been recommended for patient to be seen in office Friday 11/22/24 for a repeat PVR as patient has demonstrated the ability to urine large quantities while in office on his own. Patient is instructed to continue taking Flomax until his January follow up with AP. Patient is agreeable at this time.         Steph Tinsley LPN

## 2024-11-21 RX ORDER — LOSARTAN POTASSIUM AND HYDROCHLOROTHIAZIDE 12.5; 5 MG/1; MG/1
1 TABLET ORAL DAILY
Qty: 90 TABLET | Refills: 3 | Status: SHIPPED | OUTPATIENT
Start: 2024-11-21

## 2024-11-21 RX ORDER — PANTOPRAZOLE SODIUM 40 MG/1
40 TABLET, DELAYED RELEASE ORAL
Qty: 90 TABLET | Refills: 3 | Status: SHIPPED | OUTPATIENT
Start: 2024-11-21

## 2024-11-21 NOTE — PROGRESS NOTES
Pulmonary Initial Visit  Estuardo Ray 74 y.o. 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  Patient first noted some minor respiratory symptoms may 3 to 5 years ago in the setting of difficulty taking a deep breath, over the last 1-1/2 years, he has noted a significant decrease in his exercise tolerance.  The patient underwent a cardiac workup which was negative.  He was undergoing evaluation for  issues and had an imaging of his abdomen pelvis which noted the fibrosis for which she underwent HRCT which does have significant subpleural honeycombing consistent with UIP.  The patient does have occupational risk factors including agent orange and chemicals as well as down pillow exposure but denies any other clear and obvious signs of familial or CTD ILD.  Given his degree of impairment and findings on CT scan, discussed at length watchful waiting versus initiation of Ofev and patient chose to start on the Ofev which is fully appropriate.  Will check PFTs currently and repeat again in 3 months.  Upon the patient is in office 6-minute walk test, he did have significant desaturation from 98 to 91% but does not require supplemental oxygen.    Interstitial Lung Disease  -  Possible IPF versus agent orange associated lung disease versus chemical associated lung disease  -  Check PFTs  -  Check 6mwt  -  Start Ofev  -  Check basic autoimmune panel  -  does have down feather exposure   -  cehck HP panel    Chronic rhinitis  -  start saline nasal rinse  -  start flonase    Patient may follow up in 2-3 or sooner as necessary.     I have spent a total time of 65 minutes in caring for this patient on the day of the visit/encounter including Diagnostic results,  Prognosis, Risks and benefits of tx options, Instructions for management, Patient and family education, Risk factor reductions, and Impressions.    Orders Placed This Encounter   Procedures    POCT 6 minute walk     Is this to qualify for an O2 order?:   No    Comprehensive metabolic panel     This is a patient instruction: Patient fasting for 8 hours or longer recommended.     Standing Status:   Future     Expected Date:   11/22/2024     Expiration Date:   11/22/2025    CBC and differential     This is a patient instruction: This test is non-fasting. Please drink two glasses of water morning of bloodwork.        Standing Status:   Future     Expected Date:   11/22/2024     Expiration Date:   11/22/2025    RF Screen w/ Reflex to Titer     Standing Status:   Future     Expected Date:   11/22/2024     Expiration Date:   11/22/2025    Cyclic citrul peptide antibody, IgG     Standing Status:   Future     Expected Date:   11/22/2024     Expiration Date:   11/22/2025    Aldolase     Standing Status:   Future     Expected Date:   11/22/2024     Expiration Date:   11/22/2025    SHANTELL Screen w/ Reflex to Titer/Pattern     Standing Status:   Future     Expected Date:   11/22/2024     Expiration Date:   11/22/2025    Hypersensitivity pneumonitis profile     Standing Status:   Future     Expected Date:   11/22/2024     Expiration Date:   11/22/2025    C-reactive protein     Standing Status:   Future     Expected Date:   11/22/2024     Expiration Date:   11/22/2025    CK     Standing Status:   Future     Expected Date:   11/22/2024     Expiration Date:   11/22/2025    Anti-neutrophilic cytoplasmic antibody (C-ANCA, P-ANCA, Atypical pANCA, MPO AB, NV-3 AB)     Standing Status:   Future     Expected Date:   11/22/2024     Expiration Date:   11/22/2025    Complete PFT with post bronchodilator     Standing Status:   Future     Expiration Date:   11/22/2025     Would you like to add MVV, MIP, MEP into this order?:   No       History of  Present Illness   HPI:  Estuardo Ray is a 74 y.o. male with pmhx sig for occupational exposures, agent orange exposures who presents to establish pulmonary care in setting of interstitial lung disease..    The patient first noted breathing difficulties about 1.5 years ago.  He underwent a cardiac work up which was negative.      The patient has gone hiking multiple times before.  He did the same hike about 5 years ago but had no problems.  He notes that he walks 1.5miles every morning.  He will notice he is short of breath going up a hill.  He did notice that about 3-5 years ago he would have difficulty taking a deep breath.    He worked in [employer] industry as a  significant exposures.  He had significant exposures to formalin.    He did have exposure to agent orange in the [employer].  He spent time in [employer].      He was exposed to chemicals in the outside facility in the [place].    He worked in large [employer] running a [employer] in a clean environment.    He does do woodworking.  He does this sporadically. He has no clear mitigation system.      He has no pets at home. They used to have 3 dogs/cat and no birds.    He denies hot tubs, mold.  He has a true down pillow.    He denies family history of lung disease.  His father had osteoarthritis.  He has not seen a rheumatologist.     He denies dry eyes or dry mouth.  He denies rashes/lesions/skin problems.  He denies unusual muscle aches/pains/ weakness.     He denies rough hands.    He denies frequent infections.  He denies history of cancer/radiation/chemo.      The patient had prostate issues and had an incidental finding of pulmonary fibrosis.      Review of systems:  Review of systems was completed and was otherwise negative except as listed in HPI.    Historical Information   Past Medical History:   Diagnosis Date    Arthritis A couple years now    Cut back on sugar intake and it's better now. Mostly effecting my knees     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    Colon polyp     GERD (gastroesophageal reflux disease) Armin 15 2023    Had it occasionally,  but it recently became worse and two antacids wouldn't relieve it. Started to take 1 Prilosec every other day and this relived it.    Hemorrhoids     Hypertension     Inflamed seborrheic keratosis     Multiple benign polyps of large intestine     History of personal colonic polyps    Neoplasm of uncertain behavior of skin     Sebaceous cyst      Past Surgical History:   Procedure Laterality Date    COLONOSCOPY      HAND SURGERY Left     thumb ligament repair    INGUINAL HERNIA REPAIR      KNEE ARTHROSCOPY Left     NV CYSTOURETHROSCOPY N/A 10/7/2024    Procedure: CYSTOSCOPY, TRUS;  Surgeon: Mian Rodriguez DO;  [facility];  Service: Urology    NV LASER ENUCLEATION PROSTATE W/MORCELLATION N/A 11/15/2024    Procedure: LASER ENUCLEATION PROSTATE W MORCELLATION;  Surgeon: Mian Rodriguez DO;  [facility];  Service: Urology    TONSILLECTOMY      US GUIDED INTRAOPERATIVE  10/7/2024     Family History   Problem Relation Age of Onset    Stroke Mother         stroke syndrome    Other Father         hematologic disorder       Social History     Socioeconomic History    Marital status: /Civil Union     Spouse name: None    Number of children: None    Years of education: None    Highest education level: None   Occupational History    None   Tobacco Use    Smoking status: Former     Current packs/day: 0.00     Average packs/day: 0.5 packs/day for 5.0 years (2.5 ttl pk-yrs)     Types: Cigarettes     Start date:      Quit date:      Years since quittin.9    Smokeless tobacco: Never   Vaping Use    Vaping status: Never Used   Substance and Sexual Activity    Alcohol use: Not Currently    Drug use: No    Sexual activity: Yes     Partners: Female     Birth control/protection: Male Sterilization   Other Topics Concern    None  "  Social History Narrative    Active advance directive     Social Drivers of Health     Financial Resource Strain: Low Risk  (10/10/2023)    Overall Financial Resource Strain (CARDIA)     Difficulty of Paying Living Expenses: Not hard at all   Food Insecurity: No Food Insecurity (10/22/2024)    Hunger Vital Sign     Worried About Running Out of Food in the Last Year: Never true     Ran Out of Food in the Last Year: Never true   Transportation Needs: No Transportation Needs (10/22/2024)    PRAPARE - Transportation     Lack of Transportation (Medical): No     Lack of Transportation (Non-Medical): No   Physical Activity: Sufficiently Active (3/9/2021)    Exercise Vital Sign     Days of Exercise per Week: 7 days     Minutes of Exercise per Session: 30 min   Stress: No Stress Concern Present (3/9/2021)    [employer] [place] [employer] - Occupational Stress Questionnaire     Feeling of Stress : Not at all   Social Connections: Not on file   Intimate Partner Violence: Not on file   Housing Stability: Low Risk  (10/22/2024)    Housing Stability Vital Sign     Unable to Pay for Housing in the Last Year: No     Number of Times Moved in the Last Year: 0     Homeless in the Last Year: No       Meds/Allergies   No current facility-administered medications for this visit.  Not in a hospital admission.  No Known Allergies    Vitals: Blood pressure 138/68, pulse 79, temperature (!) 96.1 °F (35.6 °C), temperature source Tympanic, height 6' 1\" (1.854 m), weight 74.9 kg (165 lb 3.2 oz), SpO2 97%., RA, Body mass index is 21.8 kg/m².    Physical Exam  General: Pleasant, Awake alert and oriented x 3, conversant without conversational dyspnea, NAD, normal affect  HEENT:  PERRL, Sclera noninjected, nonicteric OU, Nares patent, no nasal flaring, no nasal drainage, Mucous membranes, moist, no oral lesions, normal dentition  NECK: Trachea midline, no accessory muscle use, no stridor, no cervical or supraclavicular adenopathy, JVP " not elevated  CARDIAC: Reg, single s1/S2, no m/r/g  PULM: crackles at bilateral bases  CHEST: No gross deformities, equal chest expansion on inspiration bilaterally  ABD: Normoactive bowel sounds, soft nontender, nondistended, no rebound, no rigidity, no guarding  EXT: No cyanosis, no clubbing, no edema, normal capillary refill  SKIN:  No rashes, no lesions  NEURO: no focal neurologic deficits, AAOx3, moving all extremities appropriately    Labs: I have personally reviewed pertinent lab results.  Lab Results   Component Value Date    SODIUM 139 11/15/2024    K 4.0 11/15/2024     11/15/2024    CO2 28 11/15/2024    BUN 17 11/15/2024    CREATININE 0.98 11/15/2024    GLUC 108 11/15/2024    CALCIUM 8.1 (L) 11/15/2024     Lab Results   Component Value Date    WBC 11.67 (H) 11/15/2024    HGB 14.3 11/15/2024    HCT 43.1 11/15/2024    MCV 90 11/15/2024     11/15/2024     Imaging and other studies: Results Review Statement: I personally reviewed the following image studies/reports in PACS and discussed pertinent findings with Radiology: CT chest. My interpretation of the radiology images/reports is: UIP pattern.  9/12/2024:  LUNGS: Moderate basilar predominant reticulation, traction bronchiectasis/bronchiolectasis, and honeycombing, greater on the left. No significant air trapping on expiration. Benign calcified granulomas.  AIRWAYS: No significant filling defects.  PLEURA:  Unremarkable.  HEART/GREAT VESSELS: Normal heart size. Mild coronary artery calcification indicating atherosclerotic heart disease. Pulmonary artery enlargement.  MEDIASTINUM AND JOSE: Small hiatal hernia.  CHEST WALL AND LOWER NECK: Unremarkable.  UPPER ABDOMEN: Small hepatic cysts, better shown on the contrast-enhanced abdomen CT.  OSSEOUS STRUCTURES: Mild degenerative disease in the spine.    Pulmonary function testing:    No pulmonary function testing available for review/     Echocardiogram: Results Review Statement: I reviewed radiology  reports from this admission including: Echocardiogram.  10/24/2023:  Left Ventricle Left ventricular cavity size is normal. Wall thickness is normal. The left ventricular ejection fraction is 65% by visual estimation.. Systolic function is normal.  Wall motion is normal. Diastolic function is mildly abnormal, consistent with grade I (abnormal) relaxation.   Right Ventricle Right ventricular cavity size is normal. Systolic function is normal. Wall thickness is normal.   Left Atrium The atrium is mildly dilated.   Right Atrium The atrium is normal in size.   Aortic Valve The aortic valve is trileaflet. The leaflets are not thickened. The leaflets are not calcified. The leaflets exhibit normal mobility. There is no evidence of regurgitation. The aortic valve has no significant stenosis.   Mitral Valve Mitral valve structure is normal. The posterior leaflet is prolapsedThere is trace regurgitation. There is no evidence of stenosis.   Tricuspid Valve Tricuspid valve structure is normal. There is trace regurgitation. There is no evidence of stenosis.   Pulmonic Valve Pulmonic valve structure is normal. There is no evidence of regurgitation. There is no evidence of stenosis.   Ascending Aorta The aortic root is normal in size.   IVC/SVC The inferior vena cava is normal in size.   Pericardium There is no pericardial effusion. The pericardium is normal in appearance.     EKG, Pathology, and Other Studies: Results Review Statement: I reviewed radiology reports from this admission including: EKG.  11/1/2024  Sinus bradycardia    Referring:  Ilya Garcia DO  [address]  [address] [place] [address]      Lonnie Lantigua MD  [facility]

## 2024-11-22 ENCOUNTER — CONSULT (OUTPATIENT)
Age: 74
End: 2024-11-22
Payer: COMMERCIAL

## 2024-11-22 ENCOUNTER — PROCEDURE VISIT (OUTPATIENT)
Dept: UROLOGY | Facility: CLINIC | Age: 74
End: 2024-11-22
Payer: COMMERCIAL

## 2024-11-22 VITALS
SYSTOLIC BLOOD PRESSURE: 138 MMHG | BODY MASS INDEX: 21.87 KG/M2 | DIASTOLIC BLOOD PRESSURE: 68 MMHG | WEIGHT: 165 LBS | OXYGEN SATURATION: 97 % | HEIGHT: 73 IN | HEART RATE: 79 BPM | TEMPERATURE: 97.5 F

## 2024-11-22 VITALS
BODY MASS INDEX: 21.89 KG/M2 | HEART RATE: 79 BPM | OXYGEN SATURATION: 97 % | HEIGHT: 73 IN | SYSTOLIC BLOOD PRESSURE: 138 MMHG | WEIGHT: 165.2 LBS | TEMPERATURE: 96.1 F | DIASTOLIC BLOOD PRESSURE: 68 MMHG

## 2024-11-22 DIAGNOSIS — J31.0 CHRONIC RHINITIS: Primary | ICD-10-CM

## 2024-11-22 DIAGNOSIS — J84.112 UIP (USUAL INTERSTITIAL PNEUMONITIS) (HCC): ICD-10-CM

## 2024-11-22 DIAGNOSIS — N40.1 BENIGN PROSTATIC HYPERPLASIA WITH URINARY RETENTION: Primary | ICD-10-CM

## 2024-11-22 DIAGNOSIS — R33.8 BENIGN PROSTATIC HYPERPLASIA WITH URINARY RETENTION: Primary | ICD-10-CM

## 2024-11-22 LAB — POST-VOID RESIDUAL VOLUME, ML POC: 131 ML

## 2024-11-22 PROCEDURE — 94618 PULMONARY STRESS TESTING: CPT | Performed by: INTERNAL MEDICINE

## 2024-11-22 PROCEDURE — 51798 US URINE CAPACITY MEASURE: CPT

## 2024-11-22 PROCEDURE — 99204 OFFICE O/P NEW MOD 45 MIN: CPT | Performed by: INTERNAL MEDICINE

## 2024-11-22 NOTE — PROGRESS NOTES
"11/22/2024  Estuardo Ray is a 74 y.o. male  [id number]    Diagnosis:  Chief Complaint    Urinary Retention; Post-op         Patient presents for follow up post void residual s/p TOV on 11/20/24 after having HoLEP  managed by Dr. Rodriguez    Plan:  Patient's Post Void residual will be measured to ensure he does not need cath reinsertion.   Patient will follow up with Mya ALVARADO 1/3/25 for symptom check. In the meantime patient is instructed to contact office immediately with any s/s of suspected urinary retention.  Patient is agreeable with plan at this time.          Assessment:      Vitals:    11/22/24 1304   BP: 138/68   Patient Position: Sitting   Cuff Size: Standard   Pulse: 79   Temp: 97.5 °F (36.4 °C)   TempSrc: Temporal   SpO2: 97%   Weight: 74.8 kg (165 lb)   Height: 6' 1\" (1.854 m)           Patient voided while in the office.  Post void residual measured via bladder scanner to be 131 mL    Recent Results (from the past 6 hours)   POCT Measure PVR    Collection Time: 11/22/24  1:06 PM   Result Value Ref Range    POST-VOID RESIDUAL VOLUME, ML  mL     Patient denies any complications with urination at this time. Education is reinforced on maintaining adequate hydration, constipation, treatment, and prevention to allow for efficient urinary flow, and refraining from ingesting bladder irritants. Patient verbalizes understanding to teachings at this time.       Steph Tinsley LPN      "

## 2024-11-25 ENCOUNTER — TELEPHONE (OUTPATIENT)
Dept: UROLOGY | Facility: CLINIC | Age: 74
End: 2024-11-25

## 2024-11-25 ENCOUNTER — APPOINTMENT (OUTPATIENT)
Dept: LAB | Facility: HOSPITAL | Age: 74
End: 2024-11-25
Payer: COMMERCIAL

## 2024-11-25 DIAGNOSIS — J84.112 UIP (USUAL INTERSTITIAL PNEUMONITIS) (HCC): ICD-10-CM

## 2024-11-25 LAB
ALBUMIN SERPL BCG-MCNC: 4 G/DL (ref 3.5–5)
ALP SERPL-CCNC: 93 U/L (ref 34–104)
ALT SERPL W P-5'-P-CCNC: 11 U/L (ref 7–52)
ANA SER QL IA: POSITIVE
ANION GAP SERPL CALCULATED.3IONS-SCNC: 6 MMOL/L (ref 4–13)
AST SERPL W P-5'-P-CCNC: 12 U/L (ref 13–39)
BASOPHILS # BLD AUTO: 0.02 THOUSANDS/ΜL (ref 0–0.1)
BASOPHILS NFR BLD AUTO: 0 % (ref 0–1)
BILIRUB SERPL-MCNC: 1.07 MG/DL (ref 0.2–1)
BUN SERPL-MCNC: 18 MG/DL (ref 5–25)
CALCIUM SERPL-MCNC: 9.4 MG/DL (ref 8.4–10.2)
CHLORIDE SERPL-SCNC: 98 MMOL/L (ref 96–108)
CK SERPL-CCNC: 49 U/L (ref 39–308)
CO2 SERPL-SCNC: 34 MMOL/L (ref 21–32)
CREAT SERPL-MCNC: 1.07 MG/DL (ref 0.6–1.3)
CRP SERPL QL: 8.7 MG/L
EOSINOPHIL # BLD AUTO: 0.09 THOUSAND/ΜL (ref 0–0.61)
EOSINOPHIL NFR BLD AUTO: 1 % (ref 0–6)
ERYTHROCYTE [DISTWIDTH] IN BLOOD BY AUTOMATED COUNT: 13 % (ref 11.6–15.1)
GFR SERPL CREATININE-BSD FRML MDRD: 68 ML/MIN/1.73SQ M
GLUCOSE P FAST SERPL-MCNC: 113 MG/DL (ref 65–99)
HCT VFR BLD AUTO: 43.6 % (ref 36.5–49.3)
HGB BLD-MCNC: 14.4 G/DL (ref 12–17)
IMM GRANULOCYTES # BLD AUTO: 0.03 THOUSAND/UL (ref 0–0.2)
IMM GRANULOCYTES NFR BLD AUTO: 0 % (ref 0–2)
LYMPHOCYTES # BLD AUTO: 1.19 THOUSANDS/ΜL (ref 0.6–4.47)
LYMPHOCYTES NFR BLD AUTO: 15 % (ref 14–44)
MCH RBC QN AUTO: 28.8 PG (ref 26.8–34.3)
MCHC RBC AUTO-ENTMCNC: 33 G/DL (ref 31.4–37.4)
MCV RBC AUTO: 87 FL (ref 82–98)
MONOCYTES # BLD AUTO: 0.57 THOUSAND/ΜL (ref 0.17–1.22)
MONOCYTES NFR BLD AUTO: 7 % (ref 4–12)
NEUTROPHILS # BLD AUTO: 6.2 THOUSANDS/ΜL (ref 1.85–7.62)
NEUTS SEG NFR BLD AUTO: 77 % (ref 43–75)
NRBC BLD AUTO-RTO: 0 /100 WBCS
PLATELET # BLD AUTO: 291 THOUSANDS/UL (ref 149–390)
PMV BLD AUTO: 8.2 FL (ref 8.9–12.7)
POTASSIUM SERPL-SCNC: 3.9 MMOL/L (ref 3.5–5.3)
PROT SERPL-MCNC: 7.3 G/DL (ref 6.4–8.4)
RBC # BLD AUTO: 5 MILLION/UL (ref 3.88–5.62)
RHEUMATOID FACT SER QL LA: NEGATIVE
SODIUM SERPL-SCNC: 138 MMOL/L (ref 135–147)
WBC # BLD AUTO: 8.1 THOUSAND/UL (ref 4.31–10.16)

## 2024-11-25 PROCEDURE — 86430 RHEUMATOID FACTOR TEST QUAL: CPT

## 2024-11-25 PROCEDURE — 88344 IMHCHEM/IMCYTCHM EA MLT ANTB: CPT | Performed by: PATHOLOGY

## 2024-11-25 PROCEDURE — 86140 C-REACTIVE PROTEIN: CPT

## 2024-11-25 PROCEDURE — 36415 COLL VENOUS BLD VENIPUNCTURE: CPT

## 2024-11-25 PROCEDURE — 86331 IMMUNODIFFUSION OUCHTERLONY: CPT

## 2024-11-25 PROCEDURE — 86038 ANTINUCLEAR ANTIBODIES: CPT

## 2024-11-25 PROCEDURE — 85025 COMPLETE CBC W/AUTO DIFF WBC: CPT

## 2024-11-25 PROCEDURE — 86200 CCP ANTIBODY: CPT

## 2024-11-25 PROCEDURE — 86037 ANCA TITER EACH ANTIBODY: CPT

## 2024-11-25 PROCEDURE — 82550 ASSAY OF CK (CPK): CPT

## 2024-11-25 PROCEDURE — 80053 COMPREHEN METABOLIC PANEL: CPT

## 2024-11-25 PROCEDURE — 86602 ANTINOMYCES ANTIBODY: CPT

## 2024-11-25 PROCEDURE — 82085 ASSAY OF ALDOLASE: CPT

## 2024-11-25 PROCEDURE — 86671 FUNGUS NES ANTIBODY: CPT

## 2024-11-25 PROCEDURE — 86606 ASPERGILLUS ANTIBODY: CPT

## 2024-11-25 PROCEDURE — 88305 TISSUE EXAM BY PATHOLOGIST: CPT | Performed by: PATHOLOGY

## 2024-11-25 PROCEDURE — 83520 IMMUNOASSAY QUANT NOS NONAB: CPT

## 2024-11-25 PROCEDURE — 86039 ANTINUCLEAR ANTIBODIES (ANA): CPT

## 2024-11-25 NOTE — TELEPHONE ENCOUNTER
Patient status post HoLEP on 11/15/2024    He passed his office trial of void on 11/22/2024    The pathologist reached out to me today to go over the pathology results.  Patient was noted to have Karen 3+3 prostate cancer in less than 5% of the tissue.    I called the patient this afternoon to review the results.  I explained to him that he had incidentally found low risk prostate cancer.  I explained to him that patients in this scenario undergo active surveillance.  I explained that he would need a future PSA at some point.  I explained that we could discuss this regarding the timing when I see him at his follow-up visit in February 2025.    I also explained the if his prostate cancer were to progress to intermediate or high risk disease, he would be able to get radiation in the future.    With that being said, I explained to him that patients on active surveillance, particularly those who had incidentally found prostate cancer on outlet surgery, have a very low risk of the cancer progressing, but nonetheless we will continue to monitor the PSA.    He is overall very happy with the results of the surgery.  All questions were answered. He knows to call the office with questions or concerns.  He was very appreciative of the call.

## 2024-11-26 LAB
ANA HOMOGEN SER QL IF: NORMAL
ANA HOMOGEN TITR SER: NORMAL {TITER}
CCP AB SER IA-ACNC: 0.9

## 2024-11-27 LAB
ALDOLASE SERPL-CCNC: 3.9 U/L (ref 3.3–10.3)
C-ANCA TITR SER IF: NORMAL TITER
MYELOPEROXIDASE AB SER IA-ACNC: <0.2 UNITS (ref 0–0.9)
P-ANCA ATYPICAL TITR SER IF: NORMAL TITER
P-ANCA TITR SER IF: NORMAL TITER
PROTEINASE3 AB SER IA-ACNC: 0.7 UNITS (ref 0–0.9)

## 2024-11-29 ENCOUNTER — RESULTS FOLLOW-UP (OUTPATIENT)
Dept: PULMONOLOGY | Facility: CLINIC | Age: 74
End: 2024-11-29

## 2024-11-29 LAB
ASPERGILLUS FUMAGATUS IGG: NEGATIVE
AUREOBASIDIUM PULLULANS IGG: NEGATIVE
LACEYELLA SACCHARI AB SER QL: NEGATIVE
PIGEON SERUM IGG: NEGATIVE
S RECTIVIRGULA AB SER QL ID: NEGATIVE
T VULGARIS AB SER QL ID: NEGATIVE

## 2024-12-06 ENCOUNTER — TELEPHONE (OUTPATIENT)
Dept: PULMONOLOGY | Facility: CLINIC | Age: 74
End: 2024-12-06

## 2024-12-16 DIAGNOSIS — J84.112 UIP (USUAL INTERSTITIAL PNEUMONITIS) (HCC): Primary | ICD-10-CM

## 2024-12-16 RX ORDER — NINTEDANIB 150 MG/1
150 CAPSULE ORAL 2 TIMES DAILY
Qty: 60 CAPSULE | Refills: 11 | Status: SHIPPED | OUTPATIENT
Start: 2024-12-16

## 2024-12-16 NOTE — TELEPHONE ENCOUNTER
Received denial for Ofev through [facility]. Called and spoke with patient and he stated he uses [facility]. Will start auth through them.

## 2024-12-19 ENCOUNTER — TELEPHONE (OUTPATIENT)
Age: 74
End: 2024-12-19

## 2024-12-19 NOTE — TELEPHONE ENCOUNTER
Simona from [facility] pharmacy called in regarding the patient medication  Ofev as Simona was doing her consult with the patient . Pt mention he did have a colonoscopy and he has a mild Diverticulosis . Simona just wants  to know this medication can possibly cause GI issues . Simona stated for the  to call [phone] if he will like to have the medication ship out the patient or make any changes .

## 2024-12-19 NOTE — TELEPHONE ENCOUNTER
Yes, we are aware of diarrhea as a side effect but should not have any problems given risk/benefit.  He should start the medication.

## 2024-12-20 NOTE — TELEPHONE ENCOUNTER
Called [facility] back and spoke to Alfredo. Advised him on Dr. Chou note that patient should start medication. They will reach out to the patient regarding shipment.

## 2024-12-23 NOTE — TELEPHONE ENCOUNTER
Called pharmacy and made them aware that Dr. Lantigua was aware and okay with the pt still being on Ofev.

## 2024-12-23 NOTE — TELEPHONE ENCOUNTER
Pharmacist from [facility] [phone] called and make sure that if provider was aware of the diverticulitis found in recent colonoscopy. For his Ofev that shows up as a precaution and they want to make sure Dr. Lantigua is okay with preceding with the medication.     Please advise.

## 2025-01-02 ENCOUNTER — HOSPITAL ENCOUNTER (OUTPATIENT)
Dept: PULMONOLOGY | Facility: HOSPITAL | Age: 75
End: 2025-01-02
Payer: COMMERCIAL

## 2025-01-02 DIAGNOSIS — J84.112 UIP (USUAL INTERSTITIAL PNEUMONITIS) (HCC): ICD-10-CM

## 2025-01-02 PROCEDURE — 94729 DIFFUSING CAPACITY: CPT

## 2025-01-02 PROCEDURE — 94726 PLETHYSMOGRAPHY LUNG VOLUMES: CPT | Performed by: INTERNAL MEDICINE

## 2025-01-02 PROCEDURE — 94060 EVALUATION OF WHEEZING: CPT

## 2025-01-02 PROCEDURE — 94760 N-INVAS EAR/PLS OXIMETRY 1: CPT

## 2025-01-02 PROCEDURE — 94060 EVALUATION OF WHEEZING: CPT | Performed by: INTERNAL MEDICINE

## 2025-01-02 PROCEDURE — 94729 DIFFUSING CAPACITY: CPT | Performed by: INTERNAL MEDICINE

## 2025-01-02 PROCEDURE — 94726 PLETHYSMOGRAPHY LUNG VOLUMES: CPT

## 2025-01-02 RX ORDER — ALBUTEROL SULFATE 0.83 MG/ML
2.5 SOLUTION RESPIRATORY (INHALATION) ONCE
Status: COMPLETED | OUTPATIENT
Start: 2025-01-02 | End: 2025-01-02

## 2025-01-02 RX ADMIN — ALBUTEROL SULFATE 2.5 MG: 2.5 SOLUTION RESPIRATORY (INHALATION) at 07:49

## 2025-01-02 NOTE — PROGRESS NOTES
Name: Estuardo Ray      : 1950      MRN: [id number]  Encounter Provider: Mya Trinidad PA-C  Encounter Date: 1/3/2025   Encounter department: [facility]  :  Assessment & Plan  Prostate cancer (HCC)  S/p HoLEP 11/15/24  Pathology Karen 6 prostate cancer <5% of tissue  Results reviewed by Dr. Rodriguez via telephone. Patient undergoing active surveillance  Appointment as scheduled 25 with Dr. Rodriguez. PSA ordered for prior to visit  Orders:    PSA Total, Diagnostic; Future        History of Present Illness   Estuardo Ray is a 74 y.o. male who presents for follow up.    Patient was initially seen in the office on 10/3/24 for transfer of care. Patient has been followed by  urology for many years for elevated PSA. Patient has had negative prostate biopsy x2 in the past. Patient had urinary retention in the office at initial visit and perez catheter was inserted. Patient did have near syncopal event after perez catheter insertion and proceeded to ER for evaluation. 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 He underwent HoLEP on 11/15/24. Pathology showing Karen 6 prostate cancer. Patient has had maximal benefit from HoLEP and is overall happy with urination at this time.     AUA SYMPTOM SCORE      Flowsheet Row Most Recent Value   AUA SYMPTOM SCORE    How often have you had a sensation of not emptying your bladder completely after you finished urinating? 0 (P)     How often have you had to urinate again less than two hours after you finished urinating? 0 (P)     How often have you found you stopped and started again several times when you urinate? 0 (P)     How often have you found it difficult to postpone urination? 0 (P)     How often have you had a weak urinary stream? 0 (P)     How often have you had to push or strain to begin urination? 0 (P)     How many times did you most typically get up to urinate from the time you went  "to bed at night until the time you got up in the morning? 1 (P)     Quality of Life: If you were to spend the rest of your life with your urinary condition just the way it is now, how would you feel about that? 0 (P)     AUA SYMPTOM SCORE 1 (P)            Review of Systems   Constitutional:  Negative for activity change, appetite change, chills and fever.   HENT:  Negative for congestion and trouble swallowing.    Respiratory:  Negative for cough and shortness of breath.    Cardiovascular:  Negative for chest pain, palpitations and leg swelling.   Gastrointestinal:  Negative for abdominal pain, constipation, diarrhea, nausea and vomiting.   Genitourinary:  Negative for difficulty urinating, dysuria, flank pain, frequency, hematuria and urgency.   Musculoskeletal:  Negative for back pain and gait problem.   Skin:  Negative for wound.   Allergic/Immunologic: Negative for immunocompromised state.   Neurological:  Negative for dizziness and syncope.   Hematological:  Does not bruise/bleed easily.   Psychiatric/Behavioral:  Negative for confusion.    All other systems reviewed and are negative.         Objective   /76 (BP Location: Left arm, Patient Position: Sitting, Cuff Size: Standard)   Pulse 73   Temp 97.7 °F (36.5 °C) (Temporal)   Ht 6' 1\" (1.854 m)   Wt 78.5 kg (173 lb)   SpO2 98%   BMI 22.82 kg/m²     Physical Exam  Constitutional:       Appearance: Normal appearance.   HENT:      Head: Normocephalic.      Nose: Nose normal.      Mouth/Throat:      Pharynx: Oropharynx is clear.   Eyes:      Extraocular Movements: Extraocular movements intact.      Pupils: Pupils are equal, round, and reactive to light.   Pulmonary:      Effort: Pulmonary effort is normal.   Musculoskeletal:         General: Normal range of motion.      Cervical back: Normal range of motion.   Skin:     General: Skin is warm.   Neurological:      General: No focal deficit present.      Mental Status: He is alert and oriented to person, " place, and time. Mental status is at baseline.   Psychiatric:         Mood and Affect: Mood normal.         Behavior: Behavior normal.         Thought Content: Thought content normal.         Judgment: Judgment normal.          Results  Lab Results   Component Value Date    PSA 9.12 (H) 04/11/2024    PSA 7.0 (H) 03/29/2023    PSA 9.5 (H) 09/15/2022     Lab Results   Component Value Date    GLUCOSE 110 11/27/2015    CALCIUM 9.4 11/25/2024     11/27/2015    K 3.9 11/25/2024    CO2 34 (H) 11/25/2024    CL 98 11/25/2024    BUN 18 11/25/2024    CREATININE 1.07 11/25/2024     Lab Results   Component Value Date    WBC 8.10 11/25/2024    HGB 14.4 11/25/2024    HCT 43.6 11/25/2024    MCV 87 11/25/2024     11/25/2024       Office Urine Dip  No results found for this or any previous visit (from the past hour).]

## 2025-01-03 ENCOUNTER — OFFICE VISIT (OUTPATIENT)
Dept: UROLOGY | Facility: CLINIC | Age: 75
End: 2025-01-03

## 2025-01-03 VITALS
OXYGEN SATURATION: 98 % | WEIGHT: 173 LBS | DIASTOLIC BLOOD PRESSURE: 76 MMHG | BODY MASS INDEX: 22.93 KG/M2 | TEMPERATURE: 97.7 F | SYSTOLIC BLOOD PRESSURE: 126 MMHG | HEIGHT: 73 IN | HEART RATE: 73 BPM

## 2025-01-03 DIAGNOSIS — C61 PROSTATE CANCER (HCC): ICD-10-CM

## 2025-01-03 DIAGNOSIS — N40.1 BPH WITH LOWER URINARY TRACT SYMPTOMS WITHOUT URINARY OBSTRUCTION: Primary | ICD-10-CM

## 2025-01-03 PROCEDURE — 99024 POSTOP FOLLOW-UP VISIT: CPT | Performed by: PHYSICIAN ASSISTANT

## 2025-02-11 ENCOUNTER — APPOINTMENT (OUTPATIENT)
Dept: LAB | Facility: CLINIC | Age: 75
End: 2025-02-11
Payer: COMMERCIAL

## 2025-02-11 DIAGNOSIS — C61 PROSTATE CANCER (HCC): ICD-10-CM

## 2025-02-11 LAB — PSA SERPL-MCNC: 2.04 NG/ML (ref 0–4)

## 2025-02-11 PROCEDURE — 84153 ASSAY OF PSA TOTAL: CPT

## 2025-02-11 PROCEDURE — 36415 COLL VENOUS BLD VENIPUNCTURE: CPT

## 2025-02-17 ENCOUNTER — OFFICE VISIT (OUTPATIENT)
Dept: PULMONOLOGY | Facility: CLINIC | Age: 75
End: 2025-02-17
Payer: COMMERCIAL

## 2025-02-17 ENCOUNTER — APPOINTMENT (OUTPATIENT)
Dept: LAB | Facility: CLINIC | Age: 75
End: 2025-02-17
Payer: COMMERCIAL

## 2025-02-17 ENCOUNTER — APPOINTMENT (OUTPATIENT)
Dept: LAB | Facility: CLINIC | Age: 75
End: 2025-02-17

## 2025-02-17 VITALS
TEMPERATURE: 96.8 F | WEIGHT: 175.6 LBS | OXYGEN SATURATION: 97 % | DIASTOLIC BLOOD PRESSURE: 80 MMHG | HEART RATE: 74 BPM | SYSTOLIC BLOOD PRESSURE: 160 MMHG | BODY MASS INDEX: 23.17 KG/M2

## 2025-02-17 DIAGNOSIS — J31.0 CHRONIC RHINITIS: ICD-10-CM

## 2025-02-17 DIAGNOSIS — J84.112 UIP (USUAL INTERSTITIAL PNEUMONITIS) (HCC): ICD-10-CM

## 2025-02-17 DIAGNOSIS — J84.112 UIP (USUAL INTERSTITIAL PNEUMONITIS) (HCC): Primary | ICD-10-CM

## 2025-02-17 DIAGNOSIS — Z79.899 ONGOING USE OF POSSIBLY TOXIC MEDICATION: ICD-10-CM

## 2025-02-17 LAB
ALBUMIN SERPL BCG-MCNC: 4.7 G/DL (ref 3.5–5)
ALP SERPL-CCNC: 89 U/L (ref 34–104)
ALT SERPL W P-5'-P-CCNC: 16 U/L (ref 7–52)
ANION GAP SERPL CALCULATED.3IONS-SCNC: 5 MMOL/L (ref 4–13)
AST SERPL W P-5'-P-CCNC: 18 U/L (ref 13–39)
BASOPHILS # BLD AUTO: 0.03 THOUSANDS/ΜL (ref 0–0.1)
BASOPHILS NFR BLD AUTO: 0 % (ref 0–1)
BILIRUB SERPL-MCNC: 1.21 MG/DL (ref 0.2–1)
BUN SERPL-MCNC: 20 MG/DL (ref 5–25)
CALCIUM SERPL-MCNC: 9.8 MG/DL (ref 8.4–10.2)
CHLORIDE SERPL-SCNC: 100 MMOL/L (ref 96–108)
CO2 SERPL-SCNC: 35 MMOL/L (ref 21–32)
CREAT SERPL-MCNC: 1.06 MG/DL (ref 0.6–1.3)
CREAT UR-MCNC: 43.5 MG/DL
EOSINOPHIL # BLD AUTO: 0.08 THOUSAND/ΜL (ref 0–0.61)
EOSINOPHIL NFR BLD AUTO: 1 % (ref 0–6)
ERYTHROCYTE [DISTWIDTH] IN BLOOD BY AUTOMATED COUNT: 14 % (ref 11.6–15.1)
GFR SERPL CREATININE-BSD FRML MDRD: 69 ML/MIN/1.73SQ M
GLUCOSE SERPL-MCNC: 103 MG/DL (ref 65–140)
HCT VFR BLD AUTO: 50.1 % (ref 36.5–49.3)
HGB BLD-MCNC: 17.3 G/DL (ref 12–17)
IMM GRANULOCYTES # BLD AUTO: 0.05 THOUSAND/UL (ref 0–0.2)
IMM GRANULOCYTES NFR BLD AUTO: 1 % (ref 0–2)
LYMPHOCYTES # BLD AUTO: 1.99 THOUSANDS/ΜL (ref 0.6–4.47)
LYMPHOCYTES NFR BLD AUTO: 24 % (ref 14–44)
MCH RBC QN AUTO: 30.4 PG (ref 26.8–34.3)
MCHC RBC AUTO-ENTMCNC: 34.5 G/DL (ref 31.4–37.4)
MCV RBC AUTO: 88 FL (ref 82–98)
MICROALBUMIN UR-MCNC: 48.3 MG/L
MICROALBUMIN/CREAT 24H UR: 111 MG/G CREATININE (ref 0–30)
MONOCYTES # BLD AUTO: 0.54 THOUSAND/ΜL (ref 0.17–1.22)
MONOCYTES NFR BLD AUTO: 7 % (ref 4–12)
NEUTROPHILS # BLD AUTO: 5.63 THOUSANDS/ΜL (ref 1.85–7.62)
NEUTS SEG NFR BLD AUTO: 67 % (ref 43–75)
NRBC BLD AUTO-RTO: 0 /100 WBCS
PLATELET # BLD AUTO: 241 THOUSANDS/UL (ref 149–390)
PMV BLD AUTO: 8.6 FL (ref 8.9–12.7)
POTASSIUM SERPL-SCNC: 4.6 MMOL/L (ref 3.5–5.3)
PROT SERPL-MCNC: 8.1 G/DL (ref 6.4–8.4)
RBC # BLD AUTO: 5.7 MILLION/UL (ref 3.88–5.62)
SODIUM SERPL-SCNC: 140 MMOL/L (ref 135–147)
WBC # BLD AUTO: 8.32 THOUSAND/UL (ref 4.31–10.16)

## 2025-02-17 PROCEDURE — 85025 COMPLETE CBC W/AUTO DIFF WBC: CPT

## 2025-02-17 PROCEDURE — 80053 COMPREHEN METABOLIC PANEL: CPT

## 2025-02-17 PROCEDURE — 99214 OFFICE O/P EST MOD 30 MIN: CPT | Performed by: INTERNAL MEDICINE

## 2025-02-17 PROCEDURE — 82570 ASSAY OF URINE CREATININE: CPT

## 2025-02-17 PROCEDURE — 36415 COLL VENOUS BLD VENIPUNCTURE: CPT

## 2025-02-17 PROCEDURE — 82043 UR ALBUMIN QUANTITATIVE: CPT

## 2025-02-17 NOTE — PROGRESS NOTES
Progress Note - Pulmonary   Estuardo Ray 74 y.o. male MRN: [id number]   Encounter: [id number]    Assessment  Patient is a 74-year-old male presenting for routine follow-up of UIP-ILD.  The etiology of his UIP is a bit unclear, he has a mildly elevated ANCA and a trivially epilated CRP but he does have other exposures including chemicals as well as agent orange.  The patient reports his breathing is actually slightly better now.  He is tolerating the Ofev without difficulty.  Recommend checking CBC and CMP to monitor for toxicity of the Ofev as well as patient would benefit from repeat PFTs to assess trajectory of ILD.  For his chronic rhinitis, he is doing better with this on as needed Flonase.  :  Assessment & Plan  UIP (usual interstitial pneumonitis) (HCC)  -  Possible IPF versus agent orange associated lung disease versus chemical associated lung disease  -  recheck  PFTs  -  continue Ofev   -  monitor CBC/CMP  -  [facility]   -  if [facility] may consider [facility]  Orders:    Albumin / creatinine urine ratio; Future    Comprehensive metabolic panel; Future    CBC and differential; Future    Spirometry with diffusing capacity; Future    Chronic rhinitis  -  improved  -  did not tolerate nasal saline rinse  -  using flonase as needed       Ongoing use of possibly toxic medication  -  OFEV   -  check CMP/CBC       Patient may follow up in 4 months or sooner as necessary.     Subjective:   The patient notes that he has a grade up and a grade down in his yard.  He is tolerating the OFEV.  He has not required the immodium.  He denies fevers, chills, nausea or vomiting.  He denies any new rashes, lesions or skin problems.  He has a bit of knee pain.     He tried the saline rinses.  He is using flonase.  He notes that there is an improvement.  He does not report much effect from change of season.      Inhaler Regimen:  None    Remainder of review of systems negative except as described in HPI.       The following portions of the patient's history were reviewed and updated as appropriate: allergies, current medications, past family history, past medical history, past social history, past surgical history and problem list.     Objective:   Vitals: Blood pressure 160/80, pulse 74, temperature (!) 96.8 °F (36 °C), temperature source Temporal, weight 79.7 kg (175 lb 9.6 oz), SpO2 97%., RA, Body mass index is 23.17 kg/m².    Physical Exam  Gen: Pleasant, awake, alert, oriented x 3, no acute distress  HEENT: Mucous membranes moist, no oral lesions, no thrush  NECK: No accessory muscle use, JVP not elevated  Cardiac: RRR, single S1, single S2, no murmurs, no rubs, no gallops  Lungs: crackles at lung bases  Abdomen: normoactive bowel sounds, soft nontender, nondistended, no rebound or rigidity, no guarding  Extremities: no cyanosis, no clubbing, no LE edema  MSK:  Strength equal in all extremities  Derm:  No rashes/lesions noted  Neuro:  Appropriate mood/affect    Labs: I have personally reviewed pertinent lab results.  Lab Results   Component Value Date    WBC 8.32 02/17/2025    HGB 17.3 (H) 02/17/2025     02/17/2025     Lab Results   Component Value Date    CREATININE 1.06 02/17/2025    CREATININE 1.24 11/27/2015      Imaging and other studies: Results Review Statement: I reviewed radiology reports from this admission including: CT chest.  9/12/2024  Radiology findings:  LUNGS: Moderate basilar predominant reticulation, traction bronchiectasis/bronchiolectasis, and honeycombing, greater on the left. No significant air trapping on expiration. Benign calcified granulomas  AIRWAYS: No significant filling defects  PLEURA:  Unremarkable.  HEART/GREAT VESSELS: Normal heart size. Mild coronary artery calcification indicating atherosclerotic heart disease. Pulmonary artery enlargement.  MEDIASTINUM AND JOSE: Small hiatal hernia  CHEST WALL AND LOWER NECK: Unremarkable.  UPPER ABDOMEN: Small hepatic cysts, better  shown on the contrast-enhanced abdomen CT.  OSSEOUS STRUCTURES: Mild degenerative disease in the spine.    Pulmonary Function Testing: Results Review Statement: I reviewed radiology reports from this admission including: PFT.    FVC  FEV1  FEV1/FVC DLCOcor  1/2/2025 3.43 81% 2.67 81% 78 103% 18.50 66%    Lonnie Lantigua MD  [facility]

## 2025-02-17 NOTE — ASSESSMENT & PLAN NOTE
-  Possible IPF versus agent orange associated lung disease versus chemical associated lung disease  -  recheck  PFTs  -  continue Ofev   -  monitor CBC/CMP  -  [facility]   -  if [facility] may consider esbriet  Orders:    Albumin / creatinine urine ratio; Future    Comprehensive metabolic panel; Future    CBC and differential; Future    Spirometry with diffusing capacity; Future

## 2025-02-17 NOTE — PROGRESS NOTES
UROLOGY FOLLOW-UP ENCOUNTER    Estuardo Ray is a 74 y.o. male with BPH, prostate cancer     No AC     Hx of ePSA s/p neg Pbx x2     Prostate MRI 3/5/2021: PI-RADS 3 lesion; 113 g prostate     Subsequent fusion guided biopsy in 2021 was negative     Started on Flomax, Avodart     PSA 9.12 on 4/11/2024     Patient was in retention for over 500 cc when he was seen in the office in September 2024.  He had near syncopal event at that time and needed to go to ED.  Due to his issues with the Raya catheter, it was decided that he would need the OR for his cystoscopy, TRUS.     OR 10/7/24:  No medial stenosis  No urethral strictures  Enlarged prostate with bilateral lobar hypertrophy.  Kissing lobes.  Large median lobe with significant intravesical component.  Moderate to severe trabeculations  Findings of catheter cystitis  No bladder lesions  No bladder stones  Multiple cellules     Transrectal ultrasound demonstrated prostate measurements of 6.0 x 4.3 x 5.9 cm, 79.33 g       OR 11/15/24: standard 3 lobe HoLEP  --Path:  A. Prostate, TURP (55 grams):  - Prostatic adenocarcinoma, [place] Score 3 + 3 = 6, Grade Group 1.     - Percentage of tissue with carcinoma: <5% of representatively submitted tissue (37 blocks).  - Background glandular and nodular stromal hyperplasia, chronic inflammation, and reactive changes.    Passed office TOV    PSA 2.039 on 2/11/25    U dip 2/19/25: -LE, -N, trace B     cc on 2/19/25    Office 2/19/25: no leakage, nocturia x1, happy with stream    Assessment and plan:     BPH, prostate cancer    Patient is overall feeling well since his surgery.  He is very happy with his stream.  He has had essentially no leakage.  He is not having any blood in his urine.  He only wakes up once per night.    Reviewed with him that his PVR was 124 cc today.  Reviewed with him that his urine dip only showed trace blood.  Explained the PVR was somewhat elevated, however, it is not all that surprising  given the fact that he likely had some deterioration of his detrusor function due to the longstanding bladder outlet obstruction.  At this point, he voids about every 1-2 hours.  I advised him to continue doing this, as timed voiding is important for patients that do not empty the bladder all the way.  I explained that timed voiding for him will help to minimize his risk of infection, overfilling of his bladder, deterioration of kidney function, etc.  He verbalized understanding.    We also reviewed the fact that he has low risk prostate cancer on his HoLEP specimen.        Results of the most recent HoLEP pathology were reviewed with the patient.  We had a thorough discussion regarding Karen scoring.  I explained that based on prostate biopsy results, PSA, and other clinical features, localized prostate cancer is restratified into categories.  We reviewed the AUA localized prostate cancer risk stratification table below:        I then explained to the patient that he was considered to be in the low risk category.        I first explained that since patient was considered to be in the low risk category, he did not require additional staging imaging per AUA guidelines.        Management for low risk prostate cancer was then discussed with the patient.  I explained that per AUA guidelines, active surveillance is the preferred management for low risk disease.  I then explained that active surveillance was a management option in which we would perform serial PSA testing, prostate MRI imaging, and prostate biopsies in order to intermittently monitor the patient's prostate cancer.  I explained that if active surveillance were to be pursued, we would plan to obtain a confirmatory biopsy within 6 months.  I explained that even though the patient was currently at a low risk state of prostate cancer, he would still be at risk for progressing to a high risk localized prostate cancer, or even metastatic prostate cancer, while  "on active surveillance.  I explained that there were several instances in which we would need to switch from active surveillance to definitive management of his prostate cancer.  These instances include but are not limited to: Increase in Karen grade on prostate biopsy results, increase in number of positive cores on prostate biopsy results, increase in percent of core positive and prostate biopsy results, increase in PSA, patient development of anxiety/fear on active surveillance, prostate biopsy results including intraductal cancer.  I explained to the patient that studies have demonstrated between 50 to 68% of those eligible for active surveillance may be able to safely avoid definitive treatment for greater than 10 years.,  I also explained that study between 32 to 50% of patients on active surveillance will undergo definitive management within 10 years (Kip et al, 2015).  I also explained to the patient's that patients with concerning genetic abnormalities, such as the BRCA2 gene, are generally poor candidate for active surveillance.      Reviewed the fact that he had a PSA a few days ago which was 2.039.  Explained that we will do repeat PSA in about 6 months.  Explained to him that we will plan on his eventual confirmatory prostate biopsy in 12 to 18 months, standard for active surveillance protocol.                  PLAN  -Fu with AP in 6 months with PSA.  Patient should then have another PSA and prostate MRI 4 to 6 months later.  Based on the prostate MRI, patient should be offered transperineal MRI fusion versus standard office TRUS prostate biopsy.        Patient's wife, Armin, present in office today and assisted with history      Portions of the above record have been created with voice recognition software.  Occasional wrong word or \"sound alike\" substitution may have occurred due to the inherent limitations of voice recognition software.  Read the chart carefully and recognize, using context, where " substitution may have occurred.      Mian Rodriguez DO        Chief Complaint     BPH, prostate cancer    History of Present Illness     See summary above    No fevers or chills        The following portions of the patient's history were reviewed and updated as appropriate: allergies, current medications, past family history, past medical history, past social history, past surgical history and problem list.        AUA SYMPTOM SCORE      Flowsheet Row Most Recent Value   AUA SYMPTOM SCORE    How often have you had a sensation of not emptying your bladder completely after you finished urinating? 0 (P)     How often have you had to urinate again less than two hours after you finished urinating? 0 (P)     How often have you found you stopped and started again several times when you urinate? 0 (P)     How often have you found it difficult to postpone urination? 0 (P)     How often have you had a weak urinary stream? 0 (P)     How often have you had to push or strain to begin urination? 0 (P)     How many times did you most typically get up to urinate from the time you went to bed at night until the time you got up in the morning? 1 (P)     Quality of Life: If you were to spend the rest of your life with your urinary condition just the way it is now, how would you feel about that? 0 (P)     AUA SYMPTOM SCORE 1 (P)              Review of Systems     Review of Systems   Constitutional:  Negative for chills and fever.   Respiratory:  Negative for cough and shortness of breath.    Genitourinary:  Negative for dysuria and hematuria.   Neurological:  Negative for dizziness and headaches.   Psychiatric/Behavioral:  Negative for agitation and behavioral problems.        Allergies     No Known Allergies    Physical Exam     Physical Exam  Constitutional:       General: He is not in acute distress.  HENT:      Head: Normocephalic and atraumatic.   Pulmonary:      Effort: Pulmonary effort is normal. No respiratory distress.  "  Abdominal:      General: Abdomen is flat.      Palpations: Abdomen is soft.      Tenderness: There is no right CVA tenderness or left CVA tenderness.   Skin:     General: Skin is warm and dry.   Neurological:      General: No focal deficit present.      Mental Status: He is alert and oriented to person, place, and time.   Psychiatric:         Mood and Affect: Mood normal.         Behavior: Behavior normal.             Vital Signs  Vitals:    02/19/25 0719   BP: 152/86   Patient Position: Sitting   Cuff Size: Standard   Pulse: 76   Temp: 97.9 °F (36.6 °C)   TempSrc: Temporal   SpO2: 99%   Weight: 79.4 kg (175 lb)   Height: 6' 1\" (1.854 m)         Current Medications       Current Outpatient Medications:     losartan-hydrochlorothiazide (HYZAAR) 50-12.5 mg per tablet, TAKE 1 TABLET BY MOUTH EVERY DAY, Disp: 90 tablet, Rfl: 3    nintedanib esylate (Ofev) 150 MG CAPS capsule, Take 1 capsule (150 mg total) by mouth 2 (two) times a day, Disp: 60 capsule, Rfl: 11    pantoprazole (PROTONIX) 40 mg tablet, TAKE 1 TABLET BY MOUTH DAILY BEFORE BREAKFAST, Disp: 90 tablet, Rfl: 3    rosuvastatin (CRESTOR) 5 mg tablet, Take 1 tablet (5 mg total) by mouth daily, Disp: 90 tablet, Rfl: 3    Active Problems     Patient Active Problem List   Diagnosis    Primary hypertension    Mixed hyperlipidemia    Prediabetes    History of colon polyps    Elevated PSA    Heartburn    Benign prostatic hyperplasia with urinary retention    Gastroesophageal reflux disease    Arthritis    UIP (usual interstitial pneumonitis) ([facility])    Ongoing use of possibly toxic medication       Past Medical History     Past Medical History:   Diagnosis Date    Arthritis A couple years now    Cut back on sugar intake and it's better now. Mostly effecting my knees    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    Colon polyp     GERD (gastroesophageal reflux disease) Armin 15 2023    Had it occasionally,  but it recently became worse and two antacids " wouldn't relieve it. Started to take 1 Prilosec every other day and this relived it.    Hemorrhoids     Hypertension     Inflamed seborrheic keratosis     Multiple benign polyps of large intestine     History of personal colonic polyps    Neoplasm of uncertain behavior of skin     Sebaceous cyst        Surgical History     Past Surgical History:   Procedure Laterality Date    COLONOSCOPY      HAND SURGERY Left     thumb ligament repair    INGUINAL HERNIA REPAIR      KNEE ARTHROSCOPY Left     CA CYSTOURETHROSCOPY N/A 10/7/2024    Procedure: CYSTOSCOPY, TRUS;  Surgeon: Mian Rodriguez DO;  [facility];  Service: Urology    CA LASER ENUCLEATION PROSTATE W/MORCELLATION N/A 11/15/2024    Procedure: LASER ENUCLEATION PROSTATE W MORCELLATION;  Surgeon: Mian Rodriguez DO;  Location[facility];  Service: Urology    TONSILLECTOMY      US GUIDED INTRAOPERATIVE  10/7/2024         Family History     Family History   Problem Relation Age of Onset    Stroke Mother         stroke syndrome    Other Father         hematologic disorder       Social History     Social History     Social History     Tobacco Use   Smoking Status Former    Current packs/day: 0.00    Average packs/day: 0.5 packs/day for 5.0 years (2.5 ttl pk-yrs)    Types: Cigarettes    Start date:     Quit date:     Years since quittin.1    Passive exposure: Past   Smokeless Tobacco Never       Pertinent Lab Values     Lab Results   Component Value Date    CREATININE 1.06 2025       Lab Results   Component Value Date    PSA 2.039 2025    PSA 9.12 (H) 2024    PSA 7.0 (H) 2023       Pertinent Imaging     N/A    Pertinent Pathology     N/A      I have spent a total time of 30 minutes in caring for this patient on the day of the visit/encounter including Diagnostic results, Prognosis, Risks and benefits of tx options, Instructions for management, Patient and family education, Importance of tx compliance, Risk  factor reductions, Impressions, Counseling / Coordination of care, Documenting in the medical record, Reviewing/placing orders in the medical record (including tests, medications, and/or procedures), and Obtaining or reviewing history  .

## 2025-02-18 ENCOUNTER — RESULTS FOLLOW-UP (OUTPATIENT)
Dept: PULMONOLOGY | Facility: CLINIC | Age: 75
End: 2025-02-18

## 2025-02-19 ENCOUNTER — OFFICE VISIT (OUTPATIENT)
Dept: UROLOGY | Facility: CLINIC | Age: 75
End: 2025-02-19
Payer: COMMERCIAL

## 2025-02-19 VITALS
OXYGEN SATURATION: 99 % | WEIGHT: 175 LBS | HEIGHT: 73 IN | TEMPERATURE: 97.9 F | HEART RATE: 76 BPM | DIASTOLIC BLOOD PRESSURE: 86 MMHG | SYSTOLIC BLOOD PRESSURE: 152 MMHG | BODY MASS INDEX: 23.19 KG/M2

## 2025-02-19 DIAGNOSIS — N18.31 CHRONIC KIDNEY DISEASE, STAGE 3A (HCC): ICD-10-CM

## 2025-02-19 DIAGNOSIS — N40.1 BENIGN PROSTATIC HYPERPLASIA WITH URINARY RETENTION: Primary | ICD-10-CM

## 2025-02-19 DIAGNOSIS — C61 PROSTATE CANCER (HCC): ICD-10-CM

## 2025-02-19 DIAGNOSIS — R33.8 BENIGN PROSTATIC HYPERPLASIA WITH URINARY RETENTION: Primary | ICD-10-CM

## 2025-02-19 LAB
POST-VOID RESIDUAL VOLUME, ML POC: 124 ML
SL AMB  POCT GLUCOSE, UA: NORMAL
SL AMB LEUKOCYTE ESTERASE,UA: NORMAL
SL AMB POCT BILIRUBIN,UA: NORMAL
SL AMB POCT BLOOD,UA: NORMAL
SL AMB POCT CLARITY,UA: CLEAR
SL AMB POCT COLOR,UA: YELLOW
SL AMB POCT KETONES,UA: NORMAL
SL AMB POCT NITRITE,UA: NORMAL
SL AMB POCT PH,UA: 5
SL AMB POCT SPECIFIC GRAVITY,UA: 1.01
SL AMB POCT URINE PROTEIN: NORMAL
SL AMB POCT UROBILINOGEN: 0.2

## 2025-02-19 PROCEDURE — 99214 OFFICE O/P EST MOD 30 MIN: CPT | Performed by: UROLOGY

## 2025-02-19 PROCEDURE — 81002 URINALYSIS NONAUTO W/O SCOPE: CPT | Performed by: UROLOGY

## 2025-02-19 PROCEDURE — 51798 US URINE CAPACITY MEASURE: CPT | Performed by: UROLOGY

## 2025-02-27 ENCOUNTER — PATIENT MESSAGE (OUTPATIENT)
Dept: PULMONOLOGY | Facility: CLINIC | Age: 75
End: 2025-02-27

## 2025-02-27 ENCOUNTER — TELEPHONE (OUTPATIENT)
Age: 75
End: 2025-02-27

## 2025-02-27 NOTE — TELEPHONE ENCOUNTER
[facility] received by office./ pts states it needs a DX code from Dr. Lantigua.    Pt requesting a call back

## 2025-02-27 NOTE — TELEPHONE ENCOUNTER
Called [facility] at [phone] and spoke to Bertha. She re-faxed form to the [place] office.    Filled out, signed ,and faxed back to [phone].

## 2025-04-17 ENCOUNTER — HOSPITAL ENCOUNTER (OUTPATIENT)
Dept: PULMONOLOGY | Facility: HOSPITAL | Age: 75
End: 2025-04-17
Attending: INTERNAL MEDICINE
Payer: COMMERCIAL

## 2025-04-17 DIAGNOSIS — J84.112 UIP (USUAL INTERSTITIAL PNEUMONITIS) (HCC): ICD-10-CM

## 2025-04-17 PROCEDURE — 94010 BREATHING CAPACITY TEST: CPT

## 2025-04-17 PROCEDURE — 94729 DIFFUSING CAPACITY: CPT | Performed by: INTERNAL MEDICINE

## 2025-04-17 PROCEDURE — 94729 DIFFUSING CAPACITY: CPT

## 2025-04-17 PROCEDURE — 94010 BREATHING CAPACITY TEST: CPT | Performed by: INTERNAL MEDICINE

## 2025-04-17 PROCEDURE — 94760 N-INVAS EAR/PLS OXIMETRY 1: CPT

## 2025-04-22 ENCOUNTER — APPOINTMENT (OUTPATIENT)
Dept: LAB | Facility: CLINIC | Age: 75
End: 2025-04-22
Payer: COMMERCIAL

## 2025-04-22 DIAGNOSIS — E78.2 MIXED HYPERLIPIDEMIA: ICD-10-CM

## 2025-04-22 DIAGNOSIS — R73.03 PREDIABETES: ICD-10-CM

## 2025-04-22 DIAGNOSIS — C61 PROSTATE CANCER (HCC): ICD-10-CM

## 2025-04-22 LAB
ANION GAP SERPL CALCULATED.3IONS-SCNC: 8 MMOL/L (ref 4–13)
BUN SERPL-MCNC: 21 MG/DL (ref 5–25)
CALCIUM SERPL-MCNC: 9.7 MG/DL (ref 8.4–10.2)
CHLORIDE SERPL-SCNC: 101 MMOL/L (ref 96–108)
CHOLEST SERPL-MCNC: 162 MG/DL (ref ?–200)
CO2 SERPL-SCNC: 32 MMOL/L (ref 21–32)
CREAT SERPL-MCNC: 1.15 MG/DL (ref 0.6–1.3)
EST. AVERAGE GLUCOSE BLD GHB EST-MCNC: 134 MG/DL
GFR SERPL CREATININE-BSD FRML MDRD: 62 ML/MIN/1.73SQ M
GLUCOSE P FAST SERPL-MCNC: 112 MG/DL (ref 65–99)
HBA1C MFR BLD: 6.3 %
HDLC SERPL-MCNC: 51 MG/DL
LDLC SERPL CALC-MCNC: 86 MG/DL (ref 0–100)
POTASSIUM SERPL-SCNC: 4.8 MMOL/L (ref 3.5–5.3)
PSA SERPL-MCNC: 2.7 NG/ML (ref 0–4)
SODIUM SERPL-SCNC: 141 MMOL/L (ref 135–147)
TRIGL SERPL-MCNC: 127 MG/DL (ref ?–150)

## 2025-04-22 PROCEDURE — 83036 HEMOGLOBIN GLYCOSYLATED A1C: CPT

## 2025-04-22 PROCEDURE — 36415 COLL VENOUS BLD VENIPUNCTURE: CPT

## 2025-04-22 PROCEDURE — 84153 ASSAY OF PSA TOTAL: CPT

## 2025-04-22 PROCEDURE — 80061 LIPID PANEL: CPT

## 2025-04-22 PROCEDURE — 80048 BASIC METABOLIC PNL TOTAL CA: CPT

## 2025-04-29 ENCOUNTER — OFFICE VISIT (OUTPATIENT)
Age: 75
End: 2025-04-29
Payer: COMMERCIAL

## 2025-04-29 VITALS
RESPIRATION RATE: 18 BRPM | WEIGHT: 174.6 LBS | DIASTOLIC BLOOD PRESSURE: 68 MMHG | BODY MASS INDEX: 23.14 KG/M2 | HEIGHT: 73 IN | TEMPERATURE: 97.1 F | OXYGEN SATURATION: 98 % | SYSTOLIC BLOOD PRESSURE: 122 MMHG | HEART RATE: 75 BPM

## 2025-04-29 DIAGNOSIS — J84.112 UIP (USUAL INTERSTITIAL PNEUMONITIS) (HCC): ICD-10-CM

## 2025-04-29 DIAGNOSIS — R73.03 PREDIABETES: ICD-10-CM

## 2025-04-29 DIAGNOSIS — E78.2 MIXED HYPERLIPIDEMIA: ICD-10-CM

## 2025-04-29 DIAGNOSIS — I10 PRIMARY HYPERTENSION: Primary | Chronic | ICD-10-CM

## 2025-04-29 PROBLEM — R97.20 ELEVATED PSA: Status: RESOLVED | Noted: 2021-03-09 | Resolved: 2025-04-29

## 2025-04-29 PROBLEM — Z79.899 ONGOING USE OF POSSIBLY TOXIC MEDICATION: Status: RESOLVED | Noted: 2025-02-17 | Resolved: 2025-04-29

## 2025-04-29 PROBLEM — N18.31 CHRONIC KIDNEY DISEASE, STAGE 3A (HCC): Status: RESOLVED | Noted: 2025-02-19 | Resolved: 2025-04-29

## 2025-04-29 PROCEDURE — 99214 OFFICE O/P EST MOD 30 MIN: CPT | Performed by: INTERNAL MEDICINE

## 2025-04-29 NOTE — PROGRESS NOTES
Name: Estuardo Ray      : 1950      MRN: [id number]  Encounter Provider: Ilya Gracia DO  Encounter Date: 2025   Encounter department: [facility] [place]  :  Assessment & Plan  Primary hypertension    Stable and well controlled. Continue anti-HTN therapy as prescribed.    Mixed hyperlipidemia    Stable and controlled. Continue statin as prescribed.    UIP (usual interstitial pneumonitis) (HCC)    Stable and following with pulmonary. Continue Ofev.    Prediabetes    Most recent A1c was 6.3 % on 2025. Discussed dietary advice to prevent progression towards diabetes. Repeat A1c in 6 months.      Depression Screening and Follow-up Plan: Patient was screened for depression during today's encounter. They screened negative with a PHQ-2 score of 0.          History of Present Illness     History of Present Illness  The patient presents for evaluation of prediabetes, chronic kidney disease, and pulmonary fibrosis.    Chronic kidney disease has been diagnosed since 2025. Adequate hydration is maintained, with consumption of two bottles of water daily. No nonsteroidal anti-inflammatory drugs such as ibuprofen or Aleve are being taken. The patient's eGFR has never dropped below 60, and a slight amount of protein was detected in the urine on 2025.     A successful prostate operation was performed in 2024, resulting in a significant decrease in PSA levels. No urinary leakage or pain is reported post-surgery.    Pulmonary fibrosis was diagnosed, and Ofev was started. A recent pulmonary test showed slight improvement compared to the previous one.    Prediabetes is slightly elevated, which is attributed to the timing of the blood test after . The A1c has increased from 5.9% to 6.3% since .    PAST SURGICAL HISTORY:  Prostate operation in 2024.    FAMILY HISTORY  He does not have a family history of diabetes.     Review of Systems   Constitutional:  Negative for  "activity change, appetite change and fatigue.   Respiratory:  Negative for apnea, cough, chest tightness, shortness of breath and wheezing.    Cardiovascular:  Negative for chest pain, palpitations and leg swelling.   Gastrointestinal:  Negative for abdominal distention, abdominal pain, blood in stool, constipation, diarrhea, nausea and vomiting.   Musculoskeletal:  Negative for arthralgias, back pain, gait problem, joint swelling and myalgias.   Skin:  Negative for rash and wound.   Neurological:  Negative for dizziness, weakness, light-headedness, numbness and headaches.   Psychiatric/Behavioral:  Negative for behavioral problems, confusion, hallucinations, sleep disturbance and suicidal ideas. The patient is not nervous/anxious.      Objective   /68   Pulse 75   Temp (!) 97.1 °F (36.2 °C) (Tympanic)   Resp 18   Ht 6' 1\" (1.854 m)   Wt 79.2 kg (174 lb 9.6 oz)   SpO2 98%   BMI 23.04 kg/m²     Physical Exam  Constitutional:       General: He is not in acute distress.     Appearance: He is not ill-appearing.   Cardiovascular:      Rate and Rhythm: Normal rate and regular rhythm.      Heart sounds: No murmur heard.  Pulmonary:      Effort: Pulmonary effort is normal. No respiratory distress.      Breath sounds: No wheezing.   Abdominal:      General: Bowel sounds are normal. There is no distension.      Tenderness: There is no abdominal tenderness.   Musculoskeletal:      Right lower leg: No edema.      Left lower leg: No edema.   Neurological:      Mental Status: He is alert.         "

## 2025-04-29 NOTE — ASSESSMENT & PLAN NOTE
Most recent A1c was 6.3 % on 4/22/2025. Discussed dietary advice to prevent progression towards diabetes. Repeat A1c in 6 months.

## 2025-06-23 NOTE — ASSESSMENT & PLAN NOTE
-  Possible IPF versus agent orange associated lung disease versus chemical associated lung disease  -  recheck  PFTs  -  continue Ofev   -  monitor CBC/CMP    His condition remains stable with no significant changes in symptoms or pulmonary function tests. He reports no weight loss and is tolerating Ofev well, with only occasional soft stools. He is advised to continue his current regimen of Ofev 150 mg twice daily. A repeat high-resolution CT scan and pulmonary function tests are scheduled for 09/2025 to assess disease progression. Blood work is ordered for 07/2025. He is advised to avoid sun exposure due to the potential side effect of sunburn from Ofev. The possibility of participating in clinical trials was discussed.    Orders:    CBC and differential; Standing    Comprehensive metabolic panel; Standing    CT chest high resolution; Future    Spirometry with diffusing capacity; Future

## 2025-06-23 NOTE — PROGRESS NOTES
Name: Estuardo Rya      : 1950      MRN: [id number]  Encounter Provider: Lonnie Lantigua MD  Encounter Date: 2025   Encounter department: [facility] PULMONARY [facility]      Overview:  Patient is a 74 old male with past medical history significant for IPF who presents for routine follow-up.  Overall, the patient is doing exceedingly well.  His breathing is stabilized.  He is not having significant diarrhea from the Ofev.  May continue routine monitoring of HRCT and PFTs.  Continue Ofev at current dose.:  Assessment & Plan  UIP (usual interstitial pneumonitis) (HCC)  -  Possible IPF versus agent orange associated lung disease versus chemical associated lung disease  -  recheck  PFTs  -  continue Ofev   -  monitor CBC/CMP    His condition remains stable with no significant changes in symptoms or pulmonary function tests. He reports no weight loss and is tolerating Ofev well, with only occasional soft stools. He is advised to continue his current regimen of Ofev 150 mg twice daily. A repeat high-resolution CT scan and pulmonary function tests are scheduled for 2025 to assess disease progression. Blood work is ordered for 2025. He is advised to avoid sun exposure due to the potential side effect of sunburn from Ofev. The possibility of participating in clinical trials was discussed.    Orders:    CBC and differential; Standing    Comprehensive metabolic panel; Standing    CT chest high resolution; Future    Spirometry with diffusing capacity; Future     IPF (idiopathic pulmonary fibrosis) (HCC)  -  Possible IPF versus agent orange associated lung disease versus chemical associated lung disease  -  recheck  PFTs  -  continue Ofev   -  monitor CBC/CMP  -  [facility]   -  [facility]            Assessment & Plan  1. Interstitial lung disease.  2. Postnasal drip.  He reports intermittent postnasal drip and is currently using Flonase, which provides  some relief. He is advised to continue using Flonase as needed.    Follow-up  The patient will follow up in 6 months.    Estuardo Ray is a 74 y.o. male    History of Present Illness  The patient presents for interstitial lung disease.    He reports a stable condition with no significant changes in his health status. He maintains an active lifestyle, as evidenced by his ability to walk 1.5 miles yesterday, perform household chores, and attend an ophthalmology appointment. He attributes any limitations in his activities to heat rather than shortness of breath. He does not monitor his oxygen levels at home. He experiences shortness of breath during strenuous activities such as walking uphill or climbing a large flight of stairs, but this is not a new symptom.     He continues his daily regimen of Ofev, which he tolerates well, although he occasionally experiences soft stools. He has not experienced any weight loss and does not use loperamide or Imodium. He believes his pulmonary function tests have shown improvement. He recalls an incident about a month ago where he experienced chest pain after spending two days leaning over his truck, which he describes as musculoskeletal in nature.    He also reports a cough due to postnasal drip, for which he uses Flonase intermittently with some relief.    SOCIAL HISTORY  Occupations:   Exercise: Walks approximately 1.5 miles daily      Inhaler Regimen:  None    Remainder of review of systems negative except as described in HPI.    Review of Systems  Answers submitted by the patient for this visit:  Pulmonology Questionnaire (Submitted on 6/18/2025)  Chief Complaint: Primary symptoms  Chronicity: chronic  When did you first notice your symptoms?: more than 1 month ago  How often do your symptoms occur?: intermittently  Since you first noticed this problem, how has it changed?: unchanged  Do you have shortness of breath that occurs with effort or exertion?: Yes  Do you  have ear congestion?: No  Do you have heartburn?: No  Do you have fatigue?: No  Do you have nasal congestion?: No  Do you have shortness of breath when lying flat?: No  Do you have shortness of breath when you wake up?: No  Do you have sweats?: No  Have you experienced weight loss?: No  Which of the following makes your symptoms worse?: exercise, strenuous activity  Which of the following makes your symptoms better?: rest    The following portions of the patient's history were reviewed and updated as appropriate: allergies, current medications, past family history, past medical history, past social history, past surgical history and problem list.     Vitals: Blood pressure 130/80, pulse 71, temperature 98.1 °F (36.7 °C), weight 77.1 kg (170 lb), SpO2 97%., RA, Body mass index is 22.43 kg/m².  Physical Exam   Physical Exam  Heart: Regular rate and rhythm  Lungs: Crackles noted at bilateral bases  Abdomen: Soft, nontender, nondistended  Extremities: Clubbing observed in the hands. No swelling in the legs  Skin: No rashes or lesions        Results  Diagnostic Testing   - Pulmonary function testing: FEV1 increased from 2.67 (81%) to 2.83 (86%), diffusion capacity remained stable at 18.5-17.8 (66-64%)    Lab Results: I personally reviewed relevant lab results.  Lab Results   Component Value Date    WBC 8.32 02/17/2025    HGB 17.3 (H) 02/17/2025     02/17/2025     Lab Results   Component Value Date    CREATININE 1.15 04/22/2025    CREATININE 1.24 11/27/2015        Imaging and other studies: Results Review Statement: I reviewed radiology reports from this admission including: CT chest.  9/12/2024  Radiology findings:  LUNGS: Moderate basilar predominant reticulation, traction bronchiectasis/bronchiolectasis, and honeycombing, greater on the left. No significant air trapping on expiration. Benign calcified granulomas.  AIRWAYS: No significant filling defects.  PLEURA:  Unremarkable.  HEART/GREAT VESSELS: Normal heart  size. Mild coronary artery calcification indicating atherosclerotic heart disease. Pulmonary artery enlargement.  MEDIASTINUM AND JOSE: Small hiatal hernia.  CHEST WALL AND LOWER NECK: Unremarkable.  UPPER ABDOMEN: Small hepatic cysts, better shown on the contrast-enhanced abdomen CT.  OSSEOUS STRUCTURES: Mild degenerative disease in the spine.    Pulmonary Function Testing: Results Review Statement: I reviewed radiology reports from this admission including: PFT.                         FVC        FEV1            FEV1/FVC      DLCOcor  1/2/2025 3.43 81% 2.67 81% 78 103% 18.50 66%  4/17/2025 3.65 86% 2.83 86% 77 102% 17.80 64%    Lonnie Lantigua MD  [facility]

## 2025-06-24 ENCOUNTER — OFFICE VISIT (OUTPATIENT)
Dept: PULMONOLOGY | Facility: CLINIC | Age: 75
End: 2025-06-24
Payer: COMMERCIAL

## 2025-06-24 VITALS
OXYGEN SATURATION: 97 % | DIASTOLIC BLOOD PRESSURE: 80 MMHG | BODY MASS INDEX: 22.43 KG/M2 | WEIGHT: 170 LBS | TEMPERATURE: 98.1 F | HEART RATE: 71 BPM | SYSTOLIC BLOOD PRESSURE: 130 MMHG

## 2025-06-24 DIAGNOSIS — J84.112 IPF (IDIOPATHIC PULMONARY FIBROSIS) (HCC): ICD-10-CM

## 2025-06-24 DIAGNOSIS — J84.112 UIP (USUAL INTERSTITIAL PNEUMONITIS) (HCC): Primary | ICD-10-CM

## 2025-06-24 PROCEDURE — 99213 OFFICE O/P EST LOW 20 MIN: CPT | Performed by: INTERNAL MEDICINE

## 2025-06-25 NOTE — ASSESSMENT & PLAN NOTE
-  Possible IPF versus agent orange associated lung disease versus chemical associated lung disease  -  recheck  PFTs  -  continue Ofev   -  monitor CBC/CMP  -  [facility]   -  [facility] may [facility]

## 2025-07-29 ENCOUNTER — APPOINTMENT (OUTPATIENT)
Dept: LAB | Facility: CLINIC | Age: 75
End: 2025-07-29
Payer: COMMERCIAL

## 2025-07-29 DIAGNOSIS — J84.112 UIP (USUAL INTERSTITIAL PNEUMONITIS) (HCC): ICD-10-CM

## 2025-07-29 LAB
ALBUMIN SERPL BCG-MCNC: 4.4 G/DL (ref 3.5–5)
ALP SERPL-CCNC: 75 U/L (ref 34–104)
ALT SERPL W P-5'-P-CCNC: 16 U/L (ref 7–52)
ANION GAP SERPL CALCULATED.3IONS-SCNC: 9 MMOL/L (ref 4–13)
AST SERPL W P-5'-P-CCNC: 22 U/L (ref 13–39)
BASOPHILS # BLD AUTO: 0.03 THOUSANDS/ÂΜL (ref 0–0.1)
BASOPHILS NFR BLD AUTO: 1 % (ref 0–1)
BILIRUB SERPL-MCNC: 1.6 MG/DL (ref 0.2–1)
BUN SERPL-MCNC: 17 MG/DL (ref 5–25)
CALCIUM SERPL-MCNC: 9.8 MG/DL (ref 8.4–10.2)
CHLORIDE SERPL-SCNC: 100 MMOL/L (ref 96–108)
CO2 SERPL-SCNC: 30 MMOL/L (ref 21–32)
CREAT SERPL-MCNC: 1.09 MG/DL (ref 0.6–1.3)
EOSINOPHIL # BLD AUTO: 0.06 THOUSAND/ÂΜL (ref 0–0.61)
EOSINOPHIL NFR BLD AUTO: 1 % (ref 0–6)
ERYTHROCYTE [DISTWIDTH] IN BLOOD BY AUTOMATED COUNT: 12.5 % (ref 11.6–15.1)
GFR SERPL CREATININE-BSD FRML MDRD: 66 ML/MIN/1.73SQ M
GLUCOSE P FAST SERPL-MCNC: 112 MG/DL (ref 65–99)
HCT VFR BLD AUTO: 51.4 % (ref 36.5–49.3)
HGB BLD-MCNC: 17.4 G/DL (ref 12–17)
IMM GRANULOCYTES # BLD AUTO: 0.01 THOUSAND/UL (ref 0–0.2)
IMM GRANULOCYTES NFR BLD AUTO: 0 % (ref 0–2)
LYMPHOCYTES # BLD AUTO: 1.43 THOUSANDS/ÂΜL (ref 0.6–4.47)
LYMPHOCYTES NFR BLD AUTO: 22 % (ref 14–44)
MCH RBC QN AUTO: 31.3 PG (ref 26.8–34.3)
MCHC RBC AUTO-ENTMCNC: 33.9 G/DL (ref 31.4–37.4)
MCV RBC AUTO: 92 FL (ref 82–98)
MONOCYTES # BLD AUTO: 0.47 THOUSAND/ÂΜL (ref 0.17–1.22)
MONOCYTES NFR BLD AUTO: 7 % (ref 4–12)
NEUTROPHILS # BLD AUTO: 4.53 THOUSANDS/ÂΜL (ref 1.85–7.62)
NEUTS SEG NFR BLD AUTO: 69 % (ref 43–75)
NRBC BLD AUTO-RTO: 0 /100 WBCS
PLATELET # BLD AUTO: 266 THOUSANDS/UL (ref 149–390)
PMV BLD AUTO: 9.8 FL (ref 8.9–12.7)
POTASSIUM SERPL-SCNC: 4.6 MMOL/L (ref 3.5–5.3)
PROT SERPL-MCNC: 7.7 G/DL (ref 6.4–8.4)
RBC # BLD AUTO: 5.56 MILLION/UL (ref 3.88–5.62)
SODIUM SERPL-SCNC: 139 MMOL/L (ref 135–147)
WBC # BLD AUTO: 6.53 THOUSAND/UL (ref 4.31–10.16)

## 2025-07-29 PROCEDURE — 80053 COMPREHEN METABOLIC PANEL: CPT

## 2025-07-29 PROCEDURE — 85025 COMPLETE CBC W/AUTO DIFF WBC: CPT

## 2025-07-29 PROCEDURE — 36415 COLL VENOUS BLD VENIPUNCTURE: CPT

## 2025-08-21 ENCOUNTER — OFFICE VISIT (OUTPATIENT)
Dept: UROLOGY | Facility: CLINIC | Age: 75
End: 2025-08-21
Payer: COMMERCIAL

## 2025-08-21 VITALS
SYSTOLIC BLOOD PRESSURE: 128 MMHG | OXYGEN SATURATION: 98 % | HEIGHT: 73 IN | HEART RATE: 70 BPM | TEMPERATURE: 97.7 F | WEIGHT: 169 LBS | BODY MASS INDEX: 22.4 KG/M2 | DIASTOLIC BLOOD PRESSURE: 86 MMHG

## 2025-08-21 DIAGNOSIS — R33.8 BENIGN PROSTATIC HYPERPLASIA WITH URINARY RETENTION: Primary | ICD-10-CM

## 2025-08-21 DIAGNOSIS — N40.1 BENIGN PROSTATIC HYPERPLASIA WITH URINARY RETENTION: Primary | ICD-10-CM

## 2025-08-21 DIAGNOSIS — C61 PROSTATE CANCER (HCC): ICD-10-CM

## 2025-08-21 LAB — POST-VOID RESIDUAL VOLUME, ML POC: 33 ML

## 2025-08-21 PROCEDURE — 99214 OFFICE O/P EST MOD 30 MIN: CPT | Performed by: PHYSICIAN ASSISTANT

## 2025-08-21 PROCEDURE — 51798 US URINE CAPACITY MEASURE: CPT | Performed by: PHYSICIAN ASSISTANT

## (undated) DEVICE — CHLORHEXIDINE 4PCT 4 OZ

## (undated) DEVICE — GLOVE SRG BIOGEL 7.5

## (undated) DEVICE — CATH FOLEY COUDE HEMATURIA 22FR 30ML 3 WAY LUBRICATH

## (undated) DEVICE — UROLOGIC DRAIN BAG: Brand: UNBRANDED

## (undated) DEVICE — INVIEW CLEAR LEGGINGS: Brand: CONVERTORS

## (undated) DEVICE — ROTATIONS-MORCELLATOR Ø 4.8MM WL 335MM  FOR MORCESCOPE, FOR MORCELLATION FOLLOWING LASER PROSTATE ENUCLEATION, STERILE: Brand: PIRANHA

## (undated) DEVICE — LUBRICANT JELLY SURGILUBE TUBE 4OZ FLIP TOP

## (undated) DEVICE — DISPOSABLE OR TOWEL: Brand: CARDINAL HEALTH

## (undated) DEVICE — PACK TUR

## (undated) DEVICE — BAG URINE DRAINAGE 4000ML CONTINUOUS IRR

## (undated) DEVICE — PREMIUM DRY TRAY LF: Brand: MEDLINE INDUSTRIES, INC.

## (undated) DEVICE — CYSTO TUBING TUR Y IRRIGATION

## (undated) DEVICE — CONTAINER TISSUE F/PIRANHA

## (undated) DEVICE — GUIDEWIRE STRGHT TIP 0.035 IN  SOLO PLUS

## (undated) DEVICE — FIBER STD QUANTA 550 MICRON

## (undated) DEVICE — TUBING SUCTION 5MM X 12 FT